# Patient Record
Sex: FEMALE | Race: WHITE | NOT HISPANIC OR LATINO
[De-identification: names, ages, dates, MRNs, and addresses within clinical notes are randomized per-mention and may not be internally consistent; named-entity substitution may affect disease eponyms.]

---

## 2019-03-20 ENCOUNTER — ASOB RESULT (OUTPATIENT)
Age: 79
End: 2019-03-20

## 2019-03-20 ENCOUNTER — APPOINTMENT (OUTPATIENT)
Age: 79
End: 2019-03-20
Payer: MEDICARE

## 2019-03-20 PROBLEM — Z00.00 ENCOUNTER FOR PREVENTIVE HEALTH EXAMINATION: Status: ACTIVE | Noted: 2019-03-20

## 2019-03-20 PROCEDURE — 76856 US EXAM PELVIC COMPLETE: CPT | Mod: 59

## 2019-03-20 PROCEDURE — 76830 TRANSVAGINAL US NON-OB: CPT

## 2020-07-02 ENCOUNTER — INPATIENT (INPATIENT)
Facility: HOSPITAL | Age: 80
LOS: 12 days | Discharge: PSYCHIATRIC OR SUBSTANCE USE FACILITY | DRG: 885 | End: 2020-07-15
Attending: PSYCHIATRY & NEUROLOGY | Admitting: PSYCHIATRY & NEUROLOGY
Payer: MEDICARE

## 2020-07-02 VITALS
OXYGEN SATURATION: 97 % | SYSTOLIC BLOOD PRESSURE: 119 MMHG | RESPIRATION RATE: 16 BRPM | WEIGHT: 128.09 LBS | TEMPERATURE: 100 F | HEART RATE: 90 BPM | DIASTOLIC BLOOD PRESSURE: 47 MMHG | HEIGHT: 64 IN

## 2020-07-02 DIAGNOSIS — F31.4 BIPOLAR DISORDER, CURRENT EPISODE DEPRESSED, SEVERE, WITHOUT PSYCHOTIC FEATURES: ICD-10-CM

## 2020-07-02 DIAGNOSIS — F43.10 POST-TRAUMATIC STRESS DISORDER, UNSPECIFIED: ICD-10-CM

## 2020-07-02 DIAGNOSIS — R45.851 SUICIDAL IDEATIONS: ICD-10-CM

## 2020-07-02 DIAGNOSIS — F41.1 GENERALIZED ANXIETY DISORDER: ICD-10-CM

## 2020-07-02 LAB
ALBUMIN SERPL ELPH-MCNC: 3.5 G/DL — SIGNIFICANT CHANGE UP (ref 3.3–5)
ALP SERPL-CCNC: 98 U/L — SIGNIFICANT CHANGE UP (ref 40–120)
ALT FLD-CCNC: 20 U/L — SIGNIFICANT CHANGE UP (ref 12–78)
ANION GAP SERPL CALC-SCNC: 7 MMOL/L — SIGNIFICANT CHANGE UP (ref 5–17)
APAP SERPL-MCNC: < 2 UG/ML (ref 10–30)
APPEARANCE UR: CLEAR — SIGNIFICANT CHANGE UP
APTT BLD: 30.6 SEC — SIGNIFICANT CHANGE UP (ref 27.5–35.5)
AST SERPL-CCNC: 10 U/L — LOW (ref 15–37)
BASOPHILS # BLD AUTO: 0.04 K/UL — SIGNIFICANT CHANGE UP (ref 0–0.2)
BASOPHILS NFR BLD AUTO: 0.7 % — SIGNIFICANT CHANGE UP (ref 0–2)
BILIRUB SERPL-MCNC: 0.2 MG/DL — SIGNIFICANT CHANGE UP (ref 0.2–1.2)
BILIRUB UR-MCNC: NEGATIVE — SIGNIFICANT CHANGE UP
BUN SERPL-MCNC: 17 MG/DL — SIGNIFICANT CHANGE UP (ref 7–23)
CALCIUM SERPL-MCNC: 9.2 MG/DL — SIGNIFICANT CHANGE UP (ref 8.5–10.1)
CHLORIDE SERPL-SCNC: 100 MMOL/L — SIGNIFICANT CHANGE UP (ref 96–108)
CO2 SERPL-SCNC: 27 MMOL/L — SIGNIFICANT CHANGE UP (ref 22–31)
COLOR SPEC: YELLOW — SIGNIFICANT CHANGE UP
CREAT SERPL-MCNC: 0.82 MG/DL — SIGNIFICANT CHANGE UP (ref 0.5–1.3)
DIFF PNL FLD: NEGATIVE — SIGNIFICANT CHANGE UP
EOSINOPHIL # BLD AUTO: 0.03 K/UL — SIGNIFICANT CHANGE UP (ref 0–0.5)
EOSINOPHIL NFR BLD AUTO: 0.5 % — SIGNIFICANT CHANGE UP (ref 0–6)
ETHANOL SERPL-MCNC: <10 MG/DL — SIGNIFICANT CHANGE UP (ref 0–10)
GLUCOSE SERPL-MCNC: 104 MG/DL — HIGH (ref 70–99)
GLUCOSE UR QL: NEGATIVE MG/DL — SIGNIFICANT CHANGE UP
HCT VFR BLD CALC: 39 % — SIGNIFICANT CHANGE UP (ref 34.5–45)
HGB BLD-MCNC: 13.4 G/DL — SIGNIFICANT CHANGE UP (ref 11.5–15.5)
IMM GRANULOCYTES NFR BLD AUTO: 0.2 % — SIGNIFICANT CHANGE UP (ref 0–1.5)
INR BLD: 1.04 RATIO — SIGNIFICANT CHANGE UP (ref 0.88–1.16)
KETONES UR-MCNC: NEGATIVE — SIGNIFICANT CHANGE UP
LEUKOCYTE ESTERASE UR-ACNC: NEGATIVE — SIGNIFICANT CHANGE UP
LYMPHOCYTES # BLD AUTO: 1.07 K/UL — SIGNIFICANT CHANGE UP (ref 1–3.3)
LYMPHOCYTES # BLD AUTO: 17.6 % — SIGNIFICANT CHANGE UP (ref 13–44)
MCHC RBC-ENTMCNC: 33.3 PG — SIGNIFICANT CHANGE UP (ref 27–34)
MCHC RBC-ENTMCNC: 34.4 GM/DL — SIGNIFICANT CHANGE UP (ref 32–36)
MCV RBC AUTO: 97 FL — SIGNIFICANT CHANGE UP (ref 80–100)
MONOCYTES # BLD AUTO: 0.62 K/UL — SIGNIFICANT CHANGE UP (ref 0–0.9)
MONOCYTES NFR BLD AUTO: 10.2 % — SIGNIFICANT CHANGE UP (ref 2–14)
NEUTROPHILS # BLD AUTO: 4.31 K/UL — SIGNIFICANT CHANGE UP (ref 1.8–7.4)
NEUTROPHILS NFR BLD AUTO: 70.8 % — SIGNIFICANT CHANGE UP (ref 43–77)
NITRITE UR-MCNC: NEGATIVE — SIGNIFICANT CHANGE UP
PCP SPEC-MCNC: SIGNIFICANT CHANGE UP
PH UR: 7 — SIGNIFICANT CHANGE UP (ref 5–8)
PLATELET # BLD AUTO: 287 K/UL — SIGNIFICANT CHANGE UP (ref 150–400)
POTASSIUM SERPL-MCNC: 3.9 MMOL/L — SIGNIFICANT CHANGE UP (ref 3.5–5.3)
POTASSIUM SERPL-SCNC: 3.9 MMOL/L — SIGNIFICANT CHANGE UP (ref 3.5–5.3)
PROT SERPL-MCNC: 7.2 GM/DL — SIGNIFICANT CHANGE UP (ref 6–8.3)
PROT UR-MCNC: NEGATIVE MG/DL — SIGNIFICANT CHANGE UP
PROTHROM AB SERPL-ACNC: 12.1 SEC — SIGNIFICANT CHANGE UP (ref 10.6–13.6)
RBC # BLD: 4.02 M/UL — SIGNIFICANT CHANGE UP (ref 3.8–5.2)
RBC # FLD: 12.8 % — SIGNIFICANT CHANGE UP (ref 10.3–14.5)
SALICYLATES SERPL-MCNC: 1.8 MG/DL — LOW (ref 2.8–20)
SARS-COV-2 RNA SPEC QL NAA+PROBE: SIGNIFICANT CHANGE UP
SODIUM SERPL-SCNC: 134 MMOL/L — LOW (ref 135–145)
SP GR SPEC: 1.01 — SIGNIFICANT CHANGE UP (ref 1.01–1.02)
UROBILINOGEN FLD QL: NEGATIVE MG/DL — SIGNIFICANT CHANGE UP
WBC # BLD: 6.08 K/UL — SIGNIFICANT CHANGE UP (ref 3.8–10.5)
WBC # FLD AUTO: 6.08 K/UL — SIGNIFICANT CHANGE UP (ref 3.8–10.5)

## 2020-07-02 PROCEDURE — 80156 ASSAY CARBAMAZEPINE TOTAL: CPT

## 2020-07-02 PROCEDURE — 83036 HEMOGLOBIN GLYCOSYLATED A1C: CPT

## 2020-07-02 PROCEDURE — 85025 COMPLETE CBC W/AUTO DIFF WBC: CPT

## 2020-07-02 PROCEDURE — 71045 X-RAY EXAM CHEST 1 VIEW: CPT

## 2020-07-02 PROCEDURE — 93010 ELECTROCARDIOGRAM REPORT: CPT

## 2020-07-02 PROCEDURE — 36415 COLL VENOUS BLD VENIPUNCTURE: CPT

## 2020-07-02 PROCEDURE — 80053 COMPREHEN METABOLIC PANEL: CPT

## 2020-07-02 PROCEDURE — 81001 URINALYSIS AUTO W/SCOPE: CPT

## 2020-07-02 PROCEDURE — 87040 BLOOD CULTURE FOR BACTERIA: CPT

## 2020-07-02 PROCEDURE — 87086 URINE CULTURE/COLONY COUNT: CPT

## 2020-07-02 PROCEDURE — 80061 LIPID PANEL: CPT

## 2020-07-02 PROCEDURE — 87635 SARS-COV-2 COVID-19 AMP PRB: CPT

## 2020-07-02 PROCEDURE — 86769 SARS-COV-2 COVID-19 ANTIBODY: CPT

## 2020-07-02 PROCEDURE — 84443 ASSAY THYROID STIM HORMONE: CPT

## 2020-07-02 PROCEDURE — 99222 1ST HOSP IP/OBS MODERATE 55: CPT

## 2020-07-02 RX ORDER — CLONAZEPAM 1 MG
0.25 TABLET ORAL AT BEDTIME
Refills: 0 | Status: DISCONTINUED | OUTPATIENT
Start: 2020-07-02 | End: 2020-07-09

## 2020-07-02 RX ORDER — CARBAMAZEPINE 200 MG
200 TABLET ORAL AT BEDTIME
Refills: 0 | Status: DISCONTINUED | OUTPATIENT
Start: 2020-07-02 | End: 2020-07-04

## 2020-07-02 RX ORDER — LOSARTAN POTASSIUM 100 MG/1
100 TABLET, FILM COATED ORAL DAILY
Refills: 0 | Status: DISCONTINUED | OUTPATIENT
Start: 2020-07-03 | End: 2020-07-15

## 2020-07-02 RX ORDER — LEVOTHYROXINE SODIUM 125 MCG
75 TABLET ORAL DAILY
Refills: 0 | Status: DISCONTINUED | OUTPATIENT
Start: 2020-07-02 | End: 2020-07-15

## 2020-07-02 RX ORDER — AMLODIPINE BESYLATE 2.5 MG/1
10 TABLET ORAL DAILY
Refills: 0 | Status: DISCONTINUED | OUTPATIENT
Start: 2020-07-03 | End: 2020-07-15

## 2020-07-02 RX ORDER — ESCITALOPRAM OXALATE 10 MG/1
20 TABLET, FILM COATED ORAL DAILY
Refills: 0 | Status: DISCONTINUED | OUTPATIENT
Start: 2020-07-03 | End: 2020-07-05

## 2020-07-02 RX ORDER — QUETIAPINE FUMARATE 200 MG/1
100 TABLET, FILM COATED ORAL AT BEDTIME
Refills: 0 | Status: DISCONTINUED | OUTPATIENT
Start: 2020-07-02 | End: 2020-07-15

## 2020-07-02 RX ORDER — CARBAMAZEPINE 200 MG
100 TABLET ORAL DAILY
Refills: 0 | Status: DISCONTINUED | OUTPATIENT
Start: 2020-07-03 | End: 2020-07-04

## 2020-07-02 RX ADMIN — Medication 1 MILLIGRAM(S): at 16:01

## 2020-07-02 RX ADMIN — Medication 0.25 MILLIGRAM(S): at 21:19

## 2020-07-02 RX ADMIN — QUETIAPINE FUMARATE 100 MILLIGRAM(S): 200 TABLET, FILM COATED ORAL at 21:19

## 2020-07-02 RX ADMIN — Medication 200 MILLIGRAM(S): at 21:19

## 2020-07-02 RX ADMIN — Medication 25 MILLIGRAM(S): at 21:19
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
## 2020-07-02 NOTE — ED BEHAVIORAL HEALTH ASSESSMENT NOTE - RISK ASSESSMENT
High Acute Suicide Risk ACUTE HIGH RISK     ACUTE RISK FACTORS: anxious depression, hopelessness, burdensomeness, anhedonia, suicidal ideation     CHRONIC RISK FACTORS: Bipolar, history of in-patient hospitalization, history of ECT, history of suicide attempts    PROTECTIVE FACTORS: no suicide plan / intent, supportive family, motivation for psychiatric treatment; medication compliance

## 2020-07-02 NOTE — ED BEHAVIORAL HEALTH ASSESSMENT NOTE - PAST PSYCHOTROPIC MEDICATION
multiple failed medications over the years Hence. required ECT multiple failed medications over the years Hence. required ECT  Lithium, Buspar, Abilify, Remeron, Rexulti, Vraylar, Gabapentin, Cogentin ...

## 2020-07-02 NOTE — ED BEHAVIORAL HEALTH ASSESSMENT NOTE - SUICIDE RISK FACTORS
Impulsivity/Anhedonia/Mood Disorder current/past/Current mood episode/Agitation/Severe Anxiety/Panic/Insomnia/Other
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
## 2020-07-02 NOTE — ED BEHAVIORAL HEALTH ASSESSMENT NOTE - DESCRIPTION
living with daughter Unknown. Anxious    ICU Vital Signs Last 24 Hrs  T(C): 37.7 (02 Jul 2020 12:05), Max: 37.7 (02 Jul 2020 12:05)  T(F): 99.9 (02 Jul 2020 12:05), Max: 99.9 (02 Jul 2020 12:05)  HR: 90 (02 Jul 2020 12:05) (90 - 90)  BP: 119/47 (02 Jul 2020 12:05) (119/47 - 119/47)  BP(mean): --  ABP: --  ABP(mean): --  RR: 16 (02 Jul 2020 12:05) (16 - 16)  SpO2: 97% (02 Jul 2020 12:05) (97% - 97%)

## 2020-07-02 NOTE — ED BEHAVIORAL HEALTH ASSESSMENT NOTE - VIOLENCE RISK FACTORS:
Violent ideation/threat/speech/Affective dysregulation/Lack of insight into violence risk/need for treatment/Impulsivity/Irritability
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
## 2020-07-02 NOTE — ED BEHAVIORAL HEALTH ASSESSMENT NOTE - SELF INJURIOUS BEHAVIOR WITHOUT SUICIDAL INTENT:
[facility]  [address] [address]  [phone]    5/2/2018      Karen Ríos  [address] [place] [address]      To Whom It May Concern:    This is to certify that Karen Ríos has been under our care since April 27, 2018.  Please excuse her from all work-related activities since her admission through May 9th to allow for proper outpatient recovery treatment, or sooner if she is capable or returning.  If you have any questions, please feel free to contact us at the listed number.      Sincerely,      Raad Weiner MD  [facility]    None known

## 2020-07-02 NOTE — H&P ADULT - NSHPPHYSICALEXAM_GEN_ALL_CORE
Vital Signs  T(F): 98.7 (02 Jul 2020 15:14), Max: 99.9 (02 Jul 2020 12:05)  HR: 65 (02 Jul 2020 15:14) (65 - 90)  BP: 141/67 (02 Jul 2020 15:14) (119/47 - 141/67)  BP(mean): 87 (02 Jul 2020 15:14) (87 - 87)  RR: 17 (02 Jul 2020 15:14) (16 - 17)  SpO2: 98% (02 Jul 2020 15:14) (97% - 98%)

## 2020-07-02 NOTE — H&P ADULT - ATTENDING COMMENTS
78 y/o F PMHx as noted above admitted to inpatient Psychiatry for evaluation and management of suicidal ideation and Bipolar disorder.    #Bipolar disorder  ~cont. management per Psychiatry    #Hypertension  ~cont. Losartan and Amlodipine     # Hypothyroidism  ~f/u TFTs  ~cont. Levothyroxine     #Vte ppx  ~encourage ambulation

## 2020-07-02 NOTE — H&P ADULT - ASSESSMENT
80 y/o female with PMHx of HTN, Hypothyroidism and depression presented to ED with suicidal and homicidal ideation.     # Bipolar disorder  -Management per primary psych team  -Fall precaution     # HTN  -Continue with Losartan and Norvasc with holding parameters     # Hypothyroidism  -Check TSH  -Continue with Synthroid    DVT ppx   Encourage ambulation
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
## 2020-07-02 NOTE — ED ADULT NURSE NOTE - OBJECTIVE STATEMENT
pt is 78 yo female presents to ED for SI, pt states she has thoughts about hurting herself, no plans, occasional HI, she sometimes want to hurt her daughter.  She feels she is a burden to her family, +anxiety.  hx of cutting her wrist back in Feb 2020.

## 2020-07-03 LAB
A1C WITH ESTIMATED AVERAGE GLUCOSE RESULT: 5 % — SIGNIFICANT CHANGE UP (ref 4–5.6)
CHOLEST SERPL-MCNC: 232 MG/DL — HIGH (ref 10–199)
CULTURE RESULTS: SIGNIFICANT CHANGE UP
ESTIMATED AVERAGE GLUCOSE: 97 MG/DL — SIGNIFICANT CHANGE UP (ref 68–114)
HDLC SERPL-MCNC: 65 MG/DL — SIGNIFICANT CHANGE UP
LIPID PNL WITH DIRECT LDL SERPL: 154 MG/DL — HIGH
SARS-COV-2 IGG SERPL QL IA: POSITIVE
SARS-COV-2 IGM SERPL IA-ACNC: 81.2 INDEX — HIGH
SPECIMEN SOURCE: SIGNIFICANT CHANGE UP
TOTAL CHOLESTEROL/HDL RATIO MEASUREMENT: 3.6 RATIO — SIGNIFICANT CHANGE UP (ref 3.3–7.1)
TRIGL SERPL-MCNC: 68 MG/DL — SIGNIFICANT CHANGE UP (ref 10–149)
TSH SERPL-MCNC: 2.28 UU/ML — SIGNIFICANT CHANGE UP (ref 0.34–4.82)

## 2020-07-03 PROCEDURE — 99223 1ST HOSP IP/OBS HIGH 75: CPT

## 2020-07-03 RX ORDER — ACETAMINOPHEN 500 MG
325 TABLET ORAL EVERY 6 HOURS
Refills: 0 | Status: DISCONTINUED | OUTPATIENT
Start: 2020-07-03 | End: 2020-07-15

## 2020-07-03 RX ADMIN — Medication 325 MILLIGRAM(S): at 22:01

## 2020-07-03 RX ADMIN — LOSARTAN POTASSIUM 100 MILLIGRAM(S): 100 TABLET, FILM COATED ORAL at 09:50

## 2020-07-03 RX ADMIN — Medication 100 MILLIGRAM(S): at 09:50

## 2020-07-03 RX ADMIN — AMLODIPINE BESYLATE 10 MILLIGRAM(S): 2.5 TABLET ORAL at 09:47

## 2020-07-03 RX ADMIN — Medication 0.25 MILLIGRAM(S): at 21:43

## 2020-07-03 RX ADMIN — Medication 75 MICROGRAM(S): at 05:18

## 2020-07-03 RX ADMIN — Medication 25 MILLIGRAM(S): at 21:43

## 2020-07-03 RX ADMIN — Medication 200 MILLIGRAM(S): at 21:43

## 2020-07-03 RX ADMIN — Medication 25 MILLIGRAM(S): at 09:47

## 2020-07-03 RX ADMIN — ESCITALOPRAM OXALATE 20 MILLIGRAM(S): 10 TABLET, FILM COATED ORAL at 09:47

## 2020-07-03 RX ADMIN — QUETIAPINE FUMARATE 100 MILLIGRAM(S): 200 TABLET, FILM COATED ORAL at 21:43

## 2020-07-03 NOTE — PROGRESS NOTE BEHAVIORAL HEALTH - NSBHADDHXPSYCHFT_PSY_A_CORE
Dr. Rucker [phone] out-patient psychiatrist  prior multiple in-patient hospitalizations (~7 around age 30's) with positive responses to ECT and stable for 40 years;  2 recent admissions this year (2020) between 2/2020 - 5/1/2020 with precipitant being a fall 3/2020 leading to being on medication and triggered psychiatric decompensation,  prior suicidal ideation and suicide attempt via cutting wrists (needing sutures)
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
## 2020-07-03 NOTE — PROGRESS NOTE BEHAVIORAL HEALTH - NSBHCHARTREVIEWVS_PSY_A_CORE FT
Vital Signs Last 24 Hrs  T(C): 36.7 (03 Jul 2020 08:11), Max: 36.9 (03 Jul 2020 03:35)  T(F): 98.1 (03 Jul 2020 08:11), Max: 98.4 (03 Jul 2020 03:35)  HR: --  BP: --  BP(mean): --  RR: 14 (03 Jul 2020 08:11) (14 - 16)  SpO2: 100% (03 Jul 2020 08:11) (98% - 100%)

## 2020-07-03 NOTE — PROGRESS NOTE BEHAVIORAL HEALTH - VIOLENCE RISK FACTORS:
Violent ideation/threat/speech/Affective dysregulation/Irritability/Impulsivity/Lack of insight into violence risk/need for treatment
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
## 2020-07-04 PROCEDURE — 99231 SBSQ HOSP IP/OBS SF/LOW 25: CPT

## 2020-07-04 RX ORDER — CARBAMAZEPINE 200 MG
200 TABLET ORAL
Refills: 0 | Status: DISCONTINUED | OUTPATIENT
Start: 2020-07-04 | End: 2020-07-10

## 2020-07-04 RX ADMIN — Medication 25 MILLIGRAM(S): at 09:12

## 2020-07-04 RX ADMIN — Medication 75 MICROGRAM(S): at 05:46

## 2020-07-04 RX ADMIN — Medication 0.25 MILLIGRAM(S): at 20:48

## 2020-07-04 RX ADMIN — Medication 200 MILLIGRAM(S): at 20:48

## 2020-07-04 RX ADMIN — ESCITALOPRAM OXALATE 20 MILLIGRAM(S): 10 TABLET, FILM COATED ORAL at 09:11

## 2020-07-04 RX ADMIN — Medication 25 MILLIGRAM(S): at 20:48

## 2020-07-04 RX ADMIN — LOSARTAN POTASSIUM 100 MILLIGRAM(S): 100 TABLET, FILM COATED ORAL at 09:11

## 2020-07-04 RX ADMIN — Medication 100 MILLIGRAM(S): at 09:28

## 2020-07-04 RX ADMIN — QUETIAPINE FUMARATE 100 MILLIGRAM(S): 200 TABLET, FILM COATED ORAL at 20:48

## 2020-07-04 RX ADMIN — AMLODIPINE BESYLATE 10 MILLIGRAM(S): 2.5 TABLET ORAL at 09:11
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
## 2020-07-05 DIAGNOSIS — R45.851 SUICIDAL IDEATIONS: ICD-10-CM

## 2020-07-05 PROCEDURE — 99232 SBSQ HOSP IP/OBS MODERATE 35: CPT

## 2020-07-05 RX ORDER — SENNA PLUS 8.6 MG/1
2 TABLET ORAL AT BEDTIME
Refills: 0 | Status: DISCONTINUED | OUTPATIENT
Start: 2020-07-05 | End: 2020-07-15

## 2020-07-05 RX ORDER — ESCITALOPRAM OXALATE 10 MG/1
10 TABLET, FILM COATED ORAL DAILY
Refills: 0 | Status: DISCONTINUED | OUTPATIENT
Start: 2020-07-05 | End: 2020-07-15

## 2020-07-05 RX ADMIN — ESCITALOPRAM OXALATE 20 MILLIGRAM(S): 10 TABLET, FILM COATED ORAL at 09:01

## 2020-07-05 RX ADMIN — QUETIAPINE FUMARATE 100 MILLIGRAM(S): 200 TABLET, FILM COATED ORAL at 20:59

## 2020-07-05 RX ADMIN — Medication 75 MICROGRAM(S): at 06:14

## 2020-07-05 RX ADMIN — Medication 25 MILLIGRAM(S): at 20:59

## 2020-07-05 RX ADMIN — Medication 0.25 MILLIGRAM(S): at 21:00

## 2020-07-05 RX ADMIN — Medication 200 MILLIGRAM(S): at 21:00

## 2020-07-05 RX ADMIN — SENNA PLUS 2 TABLET(S): 8.6 TABLET ORAL at 20:59

## 2020-07-05 RX ADMIN — LOSARTAN POTASSIUM 100 MILLIGRAM(S): 100 TABLET, FILM COATED ORAL at 09:01

## 2020-07-05 RX ADMIN — Medication 25 MILLIGRAM(S): at 09:02

## 2020-07-05 RX ADMIN — Medication 200 MILLIGRAM(S): at 09:01

## 2020-07-05 RX ADMIN — AMLODIPINE BESYLATE 10 MILLIGRAM(S): 2.5 TABLET ORAL at 09:01

## 2020-07-05 NOTE — PROGRESS NOTE BEHAVIORAL HEALTH - NSBHCHARTREVIEWVS_PSY_A_CORE FT
Vital Signs Last 24 Hrs  T(C): 36.5 (05 Jul 2020 08:33), Max: 36.5 (05 Jul 2020 08:33)  T(F): 97.7 (05 Jul 2020 08:33), Max: 97.7 (05 Jul 2020 08:33)  HR: --  BP: --  BP(mean): --  RR: 14 (05 Jul 2020 08:33) (14 - 14)  SpO2: 100% (05 Jul 2020 08:33) (100% - 100%)
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
## 2020-07-06 LAB
APPEARANCE UR: CLEAR — SIGNIFICANT CHANGE UP
BILIRUB UR-MCNC: NEGATIVE — SIGNIFICANT CHANGE UP
COLOR SPEC: YELLOW — SIGNIFICANT CHANGE UP
DIFF PNL FLD: NEGATIVE — SIGNIFICANT CHANGE UP
GLUCOSE UR QL: NEGATIVE MG/DL — SIGNIFICANT CHANGE UP
KETONES UR-MCNC: NEGATIVE — SIGNIFICANT CHANGE UP
LEUKOCYTE ESTERASE UR-ACNC: ABNORMAL
NITRITE UR-MCNC: NEGATIVE — SIGNIFICANT CHANGE UP
PH UR: 6 — SIGNIFICANT CHANGE UP (ref 5–8)
PROT UR-MCNC: NEGATIVE MG/DL — SIGNIFICANT CHANGE UP
SP GR SPEC: 1.02 — SIGNIFICANT CHANGE UP (ref 1.01–1.02)
UROBILINOGEN FLD QL: NEGATIVE MG/DL — SIGNIFICANT CHANGE UP

## 2020-07-06 PROCEDURE — 99231 SBSQ HOSP IP/OBS SF/LOW 25: CPT

## 2020-07-06 PROCEDURE — 99232 SBSQ HOSP IP/OBS MODERATE 35: CPT

## 2020-07-06 RX ORDER — PSYLLIUM SEED (WITH DEXTROSE)
1 POWDER (GRAM) ORAL
Refills: 0 | Status: DISCONTINUED | OUTPATIENT
Start: 2020-07-06 | End: 2020-07-15

## 2020-07-06 RX ADMIN — LOSARTAN POTASSIUM 100 MILLIGRAM(S): 100 TABLET, FILM COATED ORAL at 10:38

## 2020-07-06 RX ADMIN — Medication 200 MILLIGRAM(S): at 21:42

## 2020-07-06 RX ADMIN — Medication 75 MICROGRAM(S): at 05:36

## 2020-07-06 RX ADMIN — Medication 200 MILLIGRAM(S): at 11:02

## 2020-07-06 RX ADMIN — Medication 25 MILLIGRAM(S): at 10:38

## 2020-07-06 RX ADMIN — AMLODIPINE BESYLATE 10 MILLIGRAM(S): 2.5 TABLET ORAL at 10:48

## 2020-07-06 RX ADMIN — QUETIAPINE FUMARATE 100 MILLIGRAM(S): 200 TABLET, FILM COATED ORAL at 21:40

## 2020-07-06 RX ADMIN — ESCITALOPRAM OXALATE 10 MILLIGRAM(S): 10 TABLET, FILM COATED ORAL at 10:37

## 2020-07-06 RX ADMIN — Medication 25 MILLIGRAM(S): at 21:40

## 2020-07-06 RX ADMIN — Medication 0.25 MILLIGRAM(S): at 21:40

## 2020-07-06 RX ADMIN — Medication 1 MILLIGRAM(S): at 10:44

## 2020-07-06 NOTE — PROGRESS NOTE BEHAVIORAL HEALTH - NSBHCHARTREVIEWVS_PSY_A_CORE FT
Vital Signs Last 24 Hrs  T(C): 36.6 (06 Jul 2020 08:27), Max: 36.6 (06 Jul 2020 08:27)  T(F): 97.8 (06 Jul 2020 08:27), Max: 97.8 (06 Jul 2020 08:27)  HR: --  BP: --  BP(mean): --  RR: 14 (06 Jul 2020 08:27) (14 - 14)  SpO2: 100% (06 Jul 2020 08:27) (100% - 100%)

## 2020-07-06 NOTE — PROGRESS NOTE BEHAVIORAL HEALTH - NSBHFUPINTERVALHXFT_PSY_A_CORE
Pt still with anxiety and labile mood. She is not sure about ect yet she apparently did well with the treatment  Currently pt  isolating  in her room.  Pt still claiming "I think I'm going to die here ... right?"

## 2020-07-07 PROCEDURE — 99231 SBSQ HOSP IP/OBS SF/LOW 25: CPT

## 2020-07-07 RX ADMIN — Medication 200 MILLIGRAM(S): at 21:12

## 2020-07-07 RX ADMIN — QUETIAPINE FUMARATE 100 MILLIGRAM(S): 200 TABLET, FILM COATED ORAL at 21:12

## 2020-07-07 RX ADMIN — Medication 200 MILLIGRAM(S): at 12:50

## 2020-07-07 RX ADMIN — Medication 25 MILLIGRAM(S): at 21:12

## 2020-07-07 RX ADMIN — ESCITALOPRAM OXALATE 10 MILLIGRAM(S): 10 TABLET, FILM COATED ORAL at 12:47

## 2020-07-07 RX ADMIN — Medication 1 MILLIGRAM(S): at 12:54

## 2020-07-07 RX ADMIN — Medication 0.25 MILLIGRAM(S): at 21:14

## 2020-07-07 RX ADMIN — Medication 75 MICROGRAM(S): at 06:28

## 2020-07-07 RX ADMIN — AMLODIPINE BESYLATE 10 MILLIGRAM(S): 2.5 TABLET ORAL at 12:47

## 2020-07-07 RX ADMIN — Medication 325 MILLIGRAM(S): at 14:46

## 2020-07-07 RX ADMIN — Medication 1 PACKET(S): at 14:46

## 2020-07-07 RX ADMIN — Medication 25 MILLIGRAM(S): at 12:48

## 2020-07-07 RX ADMIN — LOSARTAN POTASSIUM 100 MILLIGRAM(S): 100 TABLET, FILM COATED ORAL at 12:48
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
## 2020-07-07 NOTE — PROGRESS NOTE BEHAVIORAL HEALTH - NSBHCHARTREVIEWVS_PSY_A_CORE FT
Vital Signs Last 24 Hrs  T(C): 36.8 (07 Jul 2020 12:39), Max: 36.8 (07 Jul 2020 12:39)  T(F): 98.2 (07 Jul 2020 12:39), Max: 98.2 (07 Jul 2020 12:39)  HR: 72 (06 Jul 2020 21:40) (72 - 72)  BP: 100/64 (06 Jul 2020 21:40) (100/64 - 100/64)  BP(mean): --  RR: 15 (07 Jul 2020 12:39) (14 - 15)  SpO2: 98% (07 Jul 2020 12:39) (98% - 98%)

## 2020-07-07 NOTE — PROGRESS NOTE BEHAVIORAL HEALTH - NSBHFUPINTERVALHXFT_PSY_A_CORE
Pt continue with anxiety ,and is isolative.  Discussed with pt about her hx of good results with ECT.  Pt was willing to consider this treatment and paperwork filled out for [facility] for E[facility]

## 2020-07-08 PROCEDURE — 99231 SBSQ HOSP IP/OBS SF/LOW 25: CPT

## 2020-07-08 RX ADMIN — AMLODIPINE BESYLATE 10 MILLIGRAM(S): 2.5 TABLET ORAL at 11:18

## 2020-07-08 RX ADMIN — Medication 25 MILLIGRAM(S): at 11:18

## 2020-07-08 RX ADMIN — LOSARTAN POTASSIUM 100 MILLIGRAM(S): 100 TABLET, FILM COATED ORAL at 11:18

## 2020-07-08 RX ADMIN — QUETIAPINE FUMARATE 100 MILLIGRAM(S): 200 TABLET, FILM COATED ORAL at 21:06

## 2020-07-08 RX ADMIN — ESCITALOPRAM OXALATE 10 MILLIGRAM(S): 10 TABLET, FILM COATED ORAL at 11:18

## 2020-07-08 RX ADMIN — Medication 75 MICROGRAM(S): at 05:52

## 2020-07-08 RX ADMIN — Medication 25 MILLIGRAM(S): at 21:06

## 2020-07-08 RX ADMIN — Medication 200 MILLIGRAM(S): at 21:05

## 2020-07-08 RX ADMIN — Medication 0.25 MILLIGRAM(S): at 21:05

## 2020-07-08 RX ADMIN — Medication 1 MILLIGRAM(S): at 11:17

## 2020-07-08 RX ADMIN — Medication 200 MILLIGRAM(S): at 11:18
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
## 2020-07-08 NOTE — PROGRESS NOTE BEHAVIORAL HEALTH - NSBHCHARTREVIEWVS_PSY_A_CORE FT
Vital Signs Last 24 Hrs  T(C): 36.3 (08 Jul 2020 08:26), Max: 36.3 (08 Jul 2020 08:26)  T(F): 97.4 (08 Jul 2020 08:26), Max: 97.4 (08 Jul 2020 08:26)  HR: --  BP: --  BP(mean): --  RR: 16 (08 Jul 2020 08:26) (16 - 16)  SpO2: 98% (08 Jul 2020 08:26) (98% - 98%)

## 2020-07-09 PROCEDURE — 99231 SBSQ HOSP IP/OBS SF/LOW 25: CPT

## 2020-07-09 RX ADMIN — SENNA PLUS 2 TABLET(S): 8.6 TABLET ORAL at 20:11

## 2020-07-09 RX ADMIN — QUETIAPINE FUMARATE 100 MILLIGRAM(S): 200 TABLET, FILM COATED ORAL at 20:11

## 2020-07-09 RX ADMIN — Medication 75 MICROGRAM(S): at 05:26

## 2020-07-09 RX ADMIN — ESCITALOPRAM OXALATE 10 MILLIGRAM(S): 10 TABLET, FILM COATED ORAL at 10:20

## 2020-07-09 RX ADMIN — Medication 25 MILLIGRAM(S): at 10:20

## 2020-07-09 RX ADMIN — Medication 200 MILLIGRAM(S): at 10:20

## 2020-07-09 RX ADMIN — Medication 25 MILLIGRAM(S): at 21:01

## 2020-07-09 RX ADMIN — Medication 200 MILLIGRAM(S): at 21:01

## 2020-07-09 RX ADMIN — Medication 0.25 MILLIGRAM(S): at 20:11
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
## 2020-07-09 NOTE — PROGRESS NOTE BEHAVIORAL HEALTH - NSBHFUPINTERVALHXFT_PSY_A_CORE
Pt still with isolation and depression and is still wanting to go for ect . Pt with depressed mood and still with anxiety about her living situation.  Pt remaining on medication and is compliant.  Pt with a little less angst in general with the adjustment of the tegretal Pt still wanting ECT as she had reportedly good results with the treatment with no side effect .  Pt is denying any suicidal or homicidal intent or plan Pt remaining preoccupied

## 2020-07-09 NOTE — PROGRESS NOTE BEHAVIORAL HEALTH - NSBHCHARTREVIEWVS_PSY_A_CORE FT
Vital Signs Last 24 Hrs  T(C): --  T(F): --  HR: --  BP: --  BP(mean): --  RR: --  SpO2: -- Vital Signs Last 24 Hrs  T(C): 36.8 (09 Jul 2020 10:58), Max: 36.8 (09 Jul 2020 10:58)  T(F): 98.2 (09 Jul 2020 10:58), Max: 98.2 (09 Jul 2020 10:58)  HR: --  BP: --  BP(mean): --  RR: 15 (09 Jul 2020 10:58) (15 - 15)  SpO2: 98% (09 Jul 2020 10:58) (98% - 98%)

## 2020-07-10 PROCEDURE — 99232 SBSQ HOSP IP/OBS MODERATE 35: CPT

## 2020-07-10 RX ORDER — CLONAZEPAM 1 MG
0.25 TABLET ORAL AT BEDTIME
Refills: 0 | Status: DISCONTINUED | OUTPATIENT
Start: 2020-07-10 | End: 2020-07-15

## 2020-07-10 RX ADMIN — ESCITALOPRAM OXALATE 10 MILLIGRAM(S): 10 TABLET, FILM COATED ORAL at 09:21

## 2020-07-10 RX ADMIN — LOSARTAN POTASSIUM 100 MILLIGRAM(S): 100 TABLET, FILM COATED ORAL at 09:21

## 2020-07-10 RX ADMIN — Medication 0.25 MILLIGRAM(S): at 21:39

## 2020-07-10 RX ADMIN — QUETIAPINE FUMARATE 100 MILLIGRAM(S): 200 TABLET, FILM COATED ORAL at 21:38

## 2020-07-10 RX ADMIN — AMLODIPINE BESYLATE 10 MILLIGRAM(S): 2.5 TABLET ORAL at 09:21

## 2020-07-10 RX ADMIN — Medication 200 MILLIGRAM(S): at 09:21

## 2020-07-10 RX ADMIN — Medication 25 MILLIGRAM(S): at 21:38

## 2020-07-10 RX ADMIN — Medication 75 MICROGRAM(S): at 05:46

## 2020-07-10 NOTE — PROGRESS NOTE BEHAVIORAL HEALTH - NSBHFUPINTERVALHXFT_PSY_A_CORE
Pt daughter was saying that the pt has been only here for the last two to three days but pt has been here since July 2.   Had placed a call to Dr Rucker but had yet to get a call back but the pt now indicated that "best to text him. " . Either way the pt with initial added anxiety and periods of mood lability . and still not call back with Dr Rucker so needed to decrease the lexapro to 10mg to decrease the mood lability and the pt is less angst . Still awaiting a bed at [facility] for ECT. Pt's daughter was upset that the dose of the lexapro was decreased to 10mg which is the dose that the pt indicated was the dose that Dr Rajput prescribed for treating the anxiety .   Daughter would like Dr Choudhary to consider taking over the care of the pt . Pt herself was very gracious and apologetic for daughter's outburst, but will still attempt to switch the management of the patients care so that there would be less extraneous issues.

## 2020-07-10 NOTE — PROGRESS NOTE BEHAVIORAL HEALTH - NSBHCHARTREVIEWVS_PSY_A_CORE FT
Vital Signs Last 24 Hrs  T(C): 36.6 (10 Jul 2020 07:45), Max: 36.6 (10 Jul 2020 07:45)  T(F): 97.8 (10 Jul 2020 07:45), Max: 97.8 (10 Jul 2020 07:45)  HR: --  BP: --  BP(mean): --  RR: 16 (10 Jul 2020 07:45) (16 - 16)  SpO2: 98% (10 Jul 2020 07:45) (98% - 98%)
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
## 2020-07-11 PROCEDURE — 99231 SBSQ HOSP IP/OBS SF/LOW 25: CPT

## 2020-07-11 RX ORDER — POLYETHYLENE GLYCOL 3350 17 G/17G
17 POWDER, FOR SOLUTION ORAL ONCE
Refills: 0 | Status: COMPLETED | OUTPATIENT
Start: 2020-07-11 | End: 2020-07-11

## 2020-07-11 RX ORDER — POLYETHYLENE GLYCOL 3350 17 G/17G
17 POWDER, FOR SOLUTION ORAL DAILY
Refills: 0 | Status: DISCONTINUED | OUTPATIENT
Start: 2020-07-12 | End: 2020-07-13

## 2020-07-11 RX ADMIN — Medication 75 MICROGRAM(S): at 06:46

## 2020-07-11 RX ADMIN — Medication 0.25 MILLIGRAM(S): at 20:40

## 2020-07-11 RX ADMIN — ESCITALOPRAM OXALATE 10 MILLIGRAM(S): 10 TABLET, FILM COATED ORAL at 09:48

## 2020-07-11 RX ADMIN — POLYETHYLENE GLYCOL 3350 17 GRAM(S): 17 POWDER, FOR SOLUTION ORAL at 12:46

## 2020-07-11 RX ADMIN — Medication 25 MILLIGRAM(S): at 20:41

## 2020-07-11 RX ADMIN — QUETIAPINE FUMARATE 100 MILLIGRAM(S): 200 TABLET, FILM COATED ORAL at 20:40

## 2020-07-11 RX ADMIN — SENNA PLUS 2 TABLET(S): 8.6 TABLET ORAL at 20:41

## 2020-07-11 RX ADMIN — Medication 25 MILLIGRAM(S): at 09:48

## 2020-07-11 RX ADMIN — Medication 1 MILLIGRAM(S): at 12:46

## 2020-07-11 NOTE — PROGRESS NOTE BEHAVIORAL HEALTH - NSBHCHARTREVIEWVS_PSY_A_CORE FT
Vital Signs Last 24 Hrs  T(C): 36.8 (11 Jul 2020 07:36), Max: 36.8 (11 Jul 2020 07:36)  T(F): 98.3 (11 Jul 2020 07:36), Max: 98.3 (11 Jul 2020 07:36)  HR: --  BP: --  BP(mean): --  RR: 16 (11 Jul 2020 07:36) (16 - 16)  SpO2: 98% (11 Jul 2020 07:36) (98% - 98%)

## 2020-07-11 NOTE — PROGRESS NOTE BEHAVIORAL HEALTH - NSBHFUPINTERVALHXFT_PSY_A_CORE
Pt daughter was saying that the pt has been only here for the last two to three days but pt has been here since July 2.   Had placed a call to Dr Rucker but had yet to get a call back but the pt now indicated that "best to text him. " . Either way the pt with initial added anxiety and periods of mood lability . and still not call back with Dr Rucker so needed to decrease the Lexapro to 10 mg to decrease the mood lability and the pt is less angst . Still awaiting a bed at [facility] for ECT. Pt's daughter was upset that the dose of the Lexapro was decreased to 10 mg which is the dose that the pt indicated was the dose that Dr Rajput prescribed for treating the anxiety.  Daughter would like Dr Choudhary to consider taking over the care of the pt . Pt herself was very gracious and apologetic for daughter's outburst, but will still attempt to switch the management of the patients care so that there would be less extraneous issues.    07/11/20: Patient was seen today AM. Modo in control endorsed that she has b/l lower abdominal cramps, so she was given a dose of Miralax. She was also anxious so was given a dose of Ativan 1 mg stat was given. No further issues noted.  She is compliant with meds with no meds induced s/e till now. She seems concerned of her bowel action more than anything else today.

## 2020-07-12 PROCEDURE — 99231 SBSQ HOSP IP/OBS SF/LOW 25: CPT

## 2020-07-12 RX ADMIN — QUETIAPINE FUMARATE 100 MILLIGRAM(S): 200 TABLET, FILM COATED ORAL at 21:01

## 2020-07-12 RX ADMIN — ESCITALOPRAM OXALATE 10 MILLIGRAM(S): 10 TABLET, FILM COATED ORAL at 09:18

## 2020-07-12 RX ADMIN — Medication 75 MICROGRAM(S): at 06:52

## 2020-07-13 PROCEDURE — 99231 SBSQ HOSP IP/OBS SF/LOW 25: CPT

## 2020-07-13 RX ORDER — ONDANSETRON 8 MG/1
4 TABLET, FILM COATED ORAL EVERY 8 HOURS
Refills: 0 | Status: DISCONTINUED | OUTPATIENT
Start: 2020-07-13 | End: 2020-07-13

## 2020-07-13 RX ORDER — LOPERAMIDE HCL 2 MG
2 TABLET ORAL ONCE
Refills: 0 | Status: COMPLETED | OUTPATIENT
Start: 2020-07-13 | End: 2020-07-14

## 2020-07-13 RX ADMIN — QUETIAPINE FUMARATE 100 MILLIGRAM(S): 200 TABLET, FILM COATED ORAL at 21:08

## 2020-07-13 RX ADMIN — Medication 75 MICROGRAM(S): at 06:39

## 2020-07-13 RX ADMIN — LOSARTAN POTASSIUM 100 MILLIGRAM(S): 100 TABLET, FILM COATED ORAL at 10:05

## 2020-07-13 RX ADMIN — ESCITALOPRAM OXALATE 10 MILLIGRAM(S): 10 TABLET, FILM COATED ORAL at 10:05

## 2020-07-13 NOTE — PROGRESS NOTE BEHAVIORAL HEALTH - NSBHFUPINTERVALHXFT_PSY_A_CORE
Pt daughter was saying that the pt has been only here for the last two to three days but pt has been here since July 2.   Had placed a call to Dr Rucker but had yet to get a call back but the pt now indicated that "best to text him. " . Either way the pt with initial added anxiety and periods of mood lability . and still not call back with Dr Rucker so needed to decrease the Lexapro to 10 mg to decrease the mood lability and the pt is less angst . Still awaiting a bed at [facility] for ECT. Pt's daughter was upset that the dose of the Lexapro was decreased to 10 mg which is the dose that the pt indicated was the dose that Dr Rajput prescribed for treating the anxiety.  Daughter would like Dr Choudhary to consider taking over the care of the pt . Pt herself was very gracious and apologetic for daughter's outburst, but will still attempt to switch the management of the patients care so that there would be less extraneous issues.    07/12/20: Patient was seen today AM. She was tearful, anxious and endorsed that She may die, was advise that he is thinking too much, so she was given a dose of Ativan 1 mg to help decrease her anxiety/mood. She looked better after the Ativan 1 mg PRN dosages. We opted to have her on 1:1 due to being unstable, later she endorsed that she is not suicidal so was on Q-15 minutes watch for stability.

## 2020-07-13 NOTE — PROGRESS NOTE BEHAVIORAL HEALTH - NSBHFUPINTERVALHXFT_PSY_A_CORE
Pt daughter was saying that the pt has been only here for the last two to three days but pt has been here since July 2.   Had placed a call to Dr Rucker but had yet to get a call back but the pt now indicated that "best to text him. " . Either way the pt with initial added anxiety and periods of mood lability . and still not call back with Dr Rucker so needed to decrease the Lexapro to 10 mg to decrease the mood lability and the pt is less angst . Still awaiting a bed at [facility] for ECT. Pt's daughter was upset that the dose of the Lexapro was decreased to 10 mg which is the dose that the pt indicated was the dose that Dr Rajput prescribed for treating the anxiety.  Daughter would like Dr Choudhary to consider taking over the care of the pt . Pt herself was very gracious and apologetic for daughter's outburst, but will still attempt to switch the management of the patients care so that there would be less extraneous issues.    07/13/20: Patient was seen today AM. Mood is anxious, at times to the pont of being tearful, and endorsing that " She may die ", writer tried to reassure her and she endorsed to have a medication for anxiety. She was given a dose of Ativan 1 mg. She also enquired that she was not given her Tegretol. As per her daughter she is on Tegretol for 40 years. Tegretol 100 mg AM and Tegretol 200 mg HS. Will not be able to start Tegretol due to transfer to [facility] tomorrow for ECT. Daughter is aware. She  also c/o increased bowel movements, so all agents are now ordered as PRN when needed.

## 2020-07-13 NOTE — PROGRESS NOTE BEHAVIORAL HEALTH - NSBHCHARTREVIEWVS_PSY_A_CORE FT
Vital Signs Last 24 Hrs  T(C): 37.2 (13 Jul 2020 07:26), Max: 37.2 (13 Jul 2020 07:26)  T(F): 98.9 (13 Jul 2020 07:26), Max: 98.9 (13 Jul 2020 07:26)  HR: --  BP: --  BP(mean): --  RR: --  SpO2: --

## 2020-07-14 LAB
ALBUMIN SERPL ELPH-MCNC: 2.9 G/DL — LOW (ref 3.3–5)
ALP SERPL-CCNC: 78 U/L — SIGNIFICANT CHANGE UP (ref 40–120)
ALT FLD-CCNC: 15 U/L — SIGNIFICANT CHANGE UP (ref 12–78)
ANION GAP SERPL CALC-SCNC: 6 MMOL/L — SIGNIFICANT CHANGE UP (ref 5–17)
APPEARANCE UR: ABNORMAL
AST SERPL-CCNC: 9 U/L — LOW (ref 15–37)
BASOPHILS # BLD AUTO: 0.05 K/UL — SIGNIFICANT CHANGE UP (ref 0–0.2)
BASOPHILS NFR BLD AUTO: 0.4 % — SIGNIFICANT CHANGE UP (ref 0–2)
BILIRUB SERPL-MCNC: 0.4 MG/DL — SIGNIFICANT CHANGE UP (ref 0.2–1.2)
BILIRUB UR-MCNC: NEGATIVE — SIGNIFICANT CHANGE UP
BUN SERPL-MCNC: 12 MG/DL — SIGNIFICANT CHANGE UP (ref 7–23)
CALCIUM SERPL-MCNC: 8.8 MG/DL — SIGNIFICANT CHANGE UP (ref 8.5–10.1)
CARBAMAZEPINE SERPL-MCNC: 2.4 UG/ML — LOW (ref 4–12)
CHLORIDE SERPL-SCNC: 99 MMOL/L — SIGNIFICANT CHANGE UP (ref 96–108)
CO2 SERPL-SCNC: 30 MMOL/L — SIGNIFICANT CHANGE UP (ref 22–31)
COLOR SPEC: YELLOW — SIGNIFICANT CHANGE UP
CREAT SERPL-MCNC: 0.68 MG/DL — SIGNIFICANT CHANGE UP (ref 0.5–1.3)
DIFF PNL FLD: NEGATIVE — SIGNIFICANT CHANGE UP
EOSINOPHIL # BLD AUTO: 0.05 K/UL — SIGNIFICANT CHANGE UP (ref 0–0.5)
EOSINOPHIL NFR BLD AUTO: 0.4 % — SIGNIFICANT CHANGE UP (ref 0–6)
GLUCOSE SERPL-MCNC: 89 MG/DL — SIGNIFICANT CHANGE UP (ref 70–99)
GLUCOSE UR QL: NEGATIVE MG/DL — SIGNIFICANT CHANGE UP
HCT VFR BLD CALC: 36.5 % — SIGNIFICANT CHANGE UP (ref 34.5–45)
HGB BLD-MCNC: 11.8 G/DL — SIGNIFICANT CHANGE UP (ref 11.5–15.5)
IMM GRANULOCYTES NFR BLD AUTO: 0.3 % — SIGNIFICANT CHANGE UP (ref 0–1.5)
KETONES UR-MCNC: NEGATIVE — SIGNIFICANT CHANGE UP
LEUKOCYTE ESTERASE UR-ACNC: ABNORMAL
LYMPHOCYTES # BLD AUTO: 1.36 K/UL — SIGNIFICANT CHANGE UP (ref 1–3.3)
LYMPHOCYTES # BLD AUTO: 11.4 % — LOW (ref 13–44)
MCHC RBC-ENTMCNC: 32.2 PG — SIGNIFICANT CHANGE UP (ref 27–34)
MCHC RBC-ENTMCNC: 32.3 GM/DL — SIGNIFICANT CHANGE UP (ref 32–36)
MCV RBC AUTO: 99.7 FL — SIGNIFICANT CHANGE UP (ref 80–100)
MONOCYTES # BLD AUTO: 1.29 K/UL — HIGH (ref 0–0.9)
MONOCYTES NFR BLD AUTO: 10.8 % — SIGNIFICANT CHANGE UP (ref 2–14)
NEUTROPHILS # BLD AUTO: 9.12 K/UL — HIGH (ref 1.8–7.4)
NEUTROPHILS NFR BLD AUTO: 76.7 % — SIGNIFICANT CHANGE UP (ref 43–77)
NITRITE UR-MCNC: NEGATIVE — SIGNIFICANT CHANGE UP
PH UR: 6 — SIGNIFICANT CHANGE UP (ref 5–8)
PLATELET # BLD AUTO: 236 K/UL — SIGNIFICANT CHANGE UP (ref 150–400)
POTASSIUM SERPL-MCNC: 3.9 MMOL/L — SIGNIFICANT CHANGE UP (ref 3.5–5.3)
POTASSIUM SERPL-SCNC: 3.9 MMOL/L — SIGNIFICANT CHANGE UP (ref 3.5–5.3)
PROT SERPL-MCNC: 6.7 GM/DL — SIGNIFICANT CHANGE UP (ref 6–8.3)
PROT UR-MCNC: NEGATIVE MG/DL — SIGNIFICANT CHANGE UP
RBC # BLD: 3.66 M/UL — LOW (ref 3.8–5.2)
RBC # FLD: 12.3 % — SIGNIFICANT CHANGE UP (ref 10.3–14.5)
SARS-COV-2 RNA SPEC QL NAA+PROBE: SIGNIFICANT CHANGE UP
SODIUM SERPL-SCNC: 135 MMOL/L — SIGNIFICANT CHANGE UP (ref 135–145)
SP GR SPEC: 1.01 — SIGNIFICANT CHANGE UP (ref 1.01–1.02)
UROBILINOGEN FLD QL: NEGATIVE MG/DL — SIGNIFICANT CHANGE UP
WBC # BLD: 11.91 K/UL — HIGH (ref 3.8–10.5)
WBC # FLD AUTO: 11.91 K/UL — HIGH (ref 3.8–10.5)

## 2020-07-14 PROCEDURE — 99231 SBSQ HOSP IP/OBS SF/LOW 25: CPT

## 2020-07-14 RX ADMIN — QUETIAPINE FUMARATE 100 MILLIGRAM(S): 200 TABLET, FILM COATED ORAL at 20:36

## 2020-07-14 RX ADMIN — ESCITALOPRAM OXALATE 10 MILLIGRAM(S): 10 TABLET, FILM COATED ORAL at 09:12

## 2020-07-14 RX ADMIN — Medication 75 MICROGRAM(S): at 06:20

## 2020-07-14 RX ADMIN — Medication 2 MILLIGRAM(S): at 09:11

## 2020-07-14 RX ADMIN — Medication 325 MILLIGRAM(S): at 09:02

## 2020-07-14 RX ADMIN — LOSARTAN POTASSIUM 100 MILLIGRAM(S): 100 TABLET, FILM COATED ORAL at 09:12

## 2020-07-14 NOTE — PROGRESS NOTE BEHAVIORAL HEALTH - NSBHFUPINTERVALHXFT_PSY_A_CORE
Pt daughter was saying that the pt has been only here for the last two to three days but pt has been here since July 2.   Had placed a call to Dr Rucker but had yet to get a call back but the pt now indicated that "best to text him. " . Either way the pt with initial added anxiety and periods of mood lability . and still not call back with Dr Rucker so needed to decrease the Lexapro to 10 mg to decrease the mood lability and the pt is less angst . Still awaiting a bed at [facility] for ECT. Pt's daughter was upset that the dose of the Lexapro was decreased to 10 mg which is the dose that the pt indicated was the dose that Dr Rajput prescribed for treating the anxiety.  Daughter would like Dr Choudhary to consider taking over the care of the pt . Pt herself was very gracious and apologetic for daughter's outburst, but will still attempt to switch the management of the patients care so that there would be less extraneous issues.    07/14/20: Patient was seen today AM. She prefers to stay indoors, mood seems anxious, she developed a fever of 100.2-100.6, she received Tylenol and feels a little better later. She was later suggested to have some blood work as suggested by Hospitalist. CBC/Chemistry/Blood and Urine C/S and also U-A stat. No meds changes for now.

## 2020-07-14 NOTE — PROGRESS NOTE BEHAVIORAL HEALTH - NSBHCHARTREVIEWVS_PSY_A_CORE FT
Vital Signs Last 24 Hrs  T(C): 37.9 (14 Jul 2020 10:31), Max: 38.1 (14 Jul 2020 08:39)  T(F): 100.2 (14 Jul 2020 10:31), Max: 100.6 (14 Jul 2020 08:39)  HR: --  BP: --  BP(mean): --  RR: 12 (14 Jul 2020 08:39) (12 - 12)  SpO2: 97% (14 Jul 2020 08:39) (97% - 97%)

## 2020-07-15 ENCOUNTER — INPATIENT (INPATIENT)
Facility: HOSPITAL | Age: 80
LOS: 15 days | Discharge: ANOTHER HOSPITAL | End: 2020-07-31
Attending: PSYCHIATRY & NEUROLOGY | Admitting: PSYCHIATRY & NEUROLOGY
Payer: MEDICARE

## 2020-07-15 VITALS — TEMPERATURE: 100 F | WEIGHT: 130.07 LBS | HEIGHT: 60 IN

## 2020-07-15 VITALS — OXYGEN SATURATION: 98 % | TEMPERATURE: 100 F | RESPIRATION RATE: 14 BRPM

## 2020-07-15 DIAGNOSIS — F30.9 MANIC EPISODE, UNSPECIFIED: ICD-10-CM

## 2020-07-15 LAB
APPEARANCE UR: ABNORMAL
BILIRUB UR-MCNC: NEGATIVE — SIGNIFICANT CHANGE UP
COLOR SPEC: YELLOW — SIGNIFICANT CHANGE UP
DIFF PNL FLD: ABNORMAL
GLUCOSE UR QL: NEGATIVE MG/DL — SIGNIFICANT CHANGE UP
KETONES UR-MCNC: NEGATIVE — SIGNIFICANT CHANGE UP
LEUKOCYTE ESTERASE UR-ACNC: ABNORMAL
NITRITE UR-MCNC: NEGATIVE — SIGNIFICANT CHANGE UP
PH UR: 5 — SIGNIFICANT CHANGE UP (ref 5–8)
PROT UR-MCNC: NEGATIVE MG/DL — SIGNIFICANT CHANGE UP
SP GR SPEC: 1.01 — SIGNIFICANT CHANGE UP (ref 1.01–1.02)
UROBILINOGEN FLD QL: NEGATIVE MG/DL — SIGNIFICANT CHANGE UP

## 2020-07-15 PROCEDURE — 99233 SBSQ HOSP IP/OBS HIGH 50: CPT

## 2020-07-15 PROCEDURE — 99239 HOSP IP/OBS DSCHRG MGMT >30: CPT

## 2020-07-15 PROCEDURE — 99222 1ST HOSP IP/OBS MODERATE 55: CPT

## 2020-07-15 PROCEDURE — 71045 X-RAY EXAM CHEST 1 VIEW: CPT | Mod: 26

## 2020-07-15 RX ORDER — QUETIAPINE FUMARATE 200 MG/1
100 TABLET, FILM COATED ORAL AT BEDTIME
Refills: 0 | Status: DISCONTINUED | OUTPATIENT
Start: 2020-07-15 | End: 2020-07-31

## 2020-07-15 RX ORDER — HALOPERIDOL DECANOATE 100 MG/ML
5 INJECTION INTRAMUSCULAR EVERY 4 HOURS
Refills: 0 | Status: DISCONTINUED | OUTPATIENT
Start: 2020-07-15 | End: 2020-07-15

## 2020-07-15 RX ORDER — HALOPERIDOL DECANOATE 100 MG/ML
2 INJECTION INTRAMUSCULAR EVERY 4 HOURS
Refills: 0 | Status: DISCONTINUED | OUTPATIENT
Start: 2020-07-15 | End: 2020-07-31

## 2020-07-15 RX ORDER — HALOPERIDOL DECANOATE 100 MG/ML
2 INJECTION INTRAMUSCULAR ONCE
Refills: 0 | Status: DISCONTINUED | OUTPATIENT
Start: 2020-07-15 | End: 2020-07-31

## 2020-07-15 RX ORDER — AMLODIPINE BESYLATE 2.5 MG/1
1 TABLET ORAL
Qty: 0 | Refills: 0 | DISCHARGE
Start: 2020-07-15

## 2020-07-15 RX ORDER — CEFUROXIME AXETIL 250 MG
1 TABLET ORAL
Qty: 0 | Refills: 0 | DISCHARGE
Start: 2020-07-15

## 2020-07-15 RX ORDER — CEFUROXIME AXETIL 250 MG
250 TABLET ORAL ONCE
Refills: 0 | Status: COMPLETED | OUTPATIENT
Start: 2020-07-15 | End: 2020-07-15

## 2020-07-15 RX ORDER — CEFUROXIME AXETIL 250 MG
250 TABLET ORAL EVERY 12 HOURS
Refills: 0 | Status: COMPLETED | OUTPATIENT
Start: 2020-07-15 | End: 2020-07-20

## 2020-07-15 RX ORDER — LEVOTHYROXINE SODIUM 125 MCG
75 TABLET ORAL DAILY
Refills: 0 | Status: DISCONTINUED | OUTPATIENT
Start: 2020-07-15 | End: 2020-07-31

## 2020-07-15 RX ORDER — ESCITALOPRAM OXALATE 10 MG/1
1 TABLET, FILM COATED ORAL
Qty: 0 | Refills: 0 | DISCHARGE
Start: 2020-07-15

## 2020-07-15 RX ORDER — CLONAZEPAM 1 MG
0.25 TABLET ORAL AT BEDTIME
Refills: 0 | Status: DISCONTINUED | OUTPATIENT
Start: 2020-07-15 | End: 2020-07-22

## 2020-07-15 RX ORDER — ESCITALOPRAM OXALATE 10 MG/1
10 TABLET, FILM COATED ORAL DAILY
Refills: 0 | Status: DISCONTINUED | OUTPATIENT
Start: 2020-07-15 | End: 2020-07-31

## 2020-07-15 RX ORDER — QUETIAPINE FUMARATE 200 MG/1
1 TABLET, FILM COATED ORAL
Qty: 0 | Refills: 0 | DISCHARGE
Start: 2020-07-15

## 2020-07-15 RX ORDER — ACETAMINOPHEN 500 MG
1 TABLET ORAL
Qty: 0 | Refills: 0 | DISCHARGE
Start: 2020-07-15

## 2020-07-15 RX ORDER — POLYETHYLENE GLYCOL 3350 17 G/17G
17 POWDER, FOR SOLUTION ORAL DAILY
Refills: 0 | Status: DISCONTINUED | OUTPATIENT
Start: 2020-07-15 | End: 2020-07-31

## 2020-07-15 RX ORDER — CEFUROXIME AXETIL 250 MG
250 TABLET ORAL EVERY 12 HOURS
Refills: 0 | Status: DISCONTINUED | OUTPATIENT
Start: 2020-07-15 | End: 2020-07-15

## 2020-07-15 RX ORDER — ONDANSETRON 8 MG/1
1 TABLET, FILM COATED ORAL
Qty: 0 | Refills: 0 | DISCHARGE
Start: 2020-07-15

## 2020-07-15 RX ORDER — LOSARTAN POTASSIUM 100 MG/1
1 TABLET, FILM COATED ORAL
Qty: 0 | Refills: 0 | DISCHARGE
Start: 2020-07-15

## 2020-07-15 RX ORDER — SENNA PLUS 8.6 MG/1
2 TABLET ORAL
Qty: 0 | Refills: 0 | DISCHARGE
Start: 2020-07-15

## 2020-07-15 RX ORDER — LEVOTHYROXINE SODIUM 125 MCG
1 TABLET ORAL
Qty: 0 | Refills: 0 | DISCHARGE
Start: 2020-07-15

## 2020-07-15 RX ORDER — HALOPERIDOL DECANOATE 100 MG/ML
5 INJECTION INTRAMUSCULAR ONCE
Refills: 0 | Status: DISCONTINUED | OUTPATIENT
Start: 2020-07-15 | End: 2020-07-15

## 2020-07-15 RX ORDER — POLYETHYLENE GLYCOL 3350 17 G/17G
17 POWDER, FOR SOLUTION ORAL
Qty: 0 | Refills: 0 | DISCHARGE
Start: 2020-07-15

## 2020-07-15 RX ORDER — ACETAMINOPHEN 500 MG
325 TABLET ORAL EVERY 6 HOURS
Refills: 0 | Status: DISCONTINUED | OUTPATIENT
Start: 2020-07-15 | End: 2020-07-31

## 2020-07-15 RX ORDER — SENNA PLUS 8.6 MG/1
2 TABLET ORAL AT BEDTIME
Refills: 0 | Status: DISCONTINUED | OUTPATIENT
Start: 2020-07-15 | End: 2020-07-31

## 2020-07-15 RX ORDER — AMLODIPINE BESYLATE 2.5 MG/1
10 TABLET ORAL DAILY
Refills: 0 | Status: DISCONTINUED | OUTPATIENT
Start: 2020-07-15 | End: 2020-07-31

## 2020-07-15 RX ORDER — LOSARTAN POTASSIUM 100 MG/1
100 TABLET, FILM COATED ORAL DAILY
Refills: 0 | Status: DISCONTINUED | OUTPATIENT
Start: 2020-07-15 | End: 2020-07-31

## 2020-07-15 RX ORDER — ONDANSETRON 8 MG/1
4 TABLET, FILM COATED ORAL EVERY 8 HOURS
Refills: 0 | Status: DISCONTINUED | OUTPATIENT
Start: 2020-07-15 | End: 2020-07-31

## 2020-07-15 RX ORDER — CLONAZEPAM 1 MG
1 TABLET ORAL
Qty: 0 | Refills: 0 | DISCHARGE
Start: 2020-07-15

## 2020-07-15 RX ADMIN — Medication 0.25 MILLIGRAM(S): at 21:07

## 2020-07-15 RX ADMIN — Medication 75 MICROGRAM(S): at 05:25

## 2020-07-15 RX ADMIN — Medication 325 MILLIGRAM(S): at 10:33

## 2020-07-15 RX ADMIN — Medication 325 MILLIGRAM(S): at 08:05

## 2020-07-15 RX ADMIN — Medication 250 MILLIGRAM(S): at 21:31

## 2020-07-15 RX ADMIN — ESCITALOPRAM OXALATE 10 MILLIGRAM(S): 10 TABLET, FILM COATED ORAL at 09:16

## 2020-07-15 RX ADMIN — Medication 250 MILLIGRAM(S): at 13:24

## 2020-07-15 RX ADMIN — Medication 325 MILLIGRAM(S): at 08:06

## 2020-07-15 RX ADMIN — LOSARTAN POTASSIUM 100 MILLIGRAM(S): 100 TABLET, FILM COATED ORAL at 09:16

## 2020-07-15 RX ADMIN — Medication 25 MILLIGRAM(S): at 21:07

## 2020-07-15 RX ADMIN — QUETIAPINE FUMARATE 100 MILLIGRAM(S): 200 TABLET, FILM COATED ORAL at 21:07

## 2020-07-15 NOTE — PROGRESS NOTE BEHAVIORAL HEALTH - THOUGHT PROCESS
Illogical/Impaired reasoning/Circumstantial/Tangential
Circumstantial/Impaired reasoning/Illogical/Tangential
Tangential/Circumstantial/Illogical/Impaired reasoning
Overinclusive/Circumstantial
Overinclusive/Circumstantial
Illogical/Impaired reasoning/Circumstantial/Tangential
Circumstantial/Tangential/Illogical/Impaired reasoning
Illogical/Impaired reasoning/Circumstantial/Tangential
Circumstantial/Tangential/Illogical/Impaired reasoning
Illogical/Impaired reasoning/Circumstantial/Tangential
Circumstantial/Impaired reasoning/Tangential/Illogical
Circumstantial/Illogical/Impaired reasoning/Tangential
Tangential/Circumstantial/Illogical/Impaired reasoning

## 2020-07-15 NOTE — PROGRESS NOTE BEHAVIORAL HEALTH - AFFECT RANGE
Labile
Constricted
Labile
Constricted
Labile

## 2020-07-15 NOTE — PROGRESS NOTE BEHAVIORAL HEALTH - PROBLEM SELECTOR PROBLEM 2
NONA (generalized anxiety disorder)

## 2020-07-15 NOTE — PROGRESS NOTE BEHAVIORAL HEALTH - NSBHFUPTYPE_PSY_A_CORE
Inpatient
Inpatient-On Service Note
Inpatient

## 2020-07-15 NOTE — DISCHARGE NOTE BEHAVIORAL HEALTH - NSBHDCTESTSFT_PSY_A_CORE
CBC; Chemistry ;EKG; U-A; U-Tox; Lipid profile; HBA1-C; EKG; CXR--WNL  COVID-19--Negative on 07/14/2020  Blood and Urine C-S in process

## 2020-07-15 NOTE — PROGRESS NOTE BEHAVIORAL HEALTH - OTHER
"I'm going to die here "

## 2020-07-15 NOTE — PROGRESS NOTE BEHAVIORAL HEALTH - NSBHPTASSESSDT_PSY_A_CORE
07-Jul-2020 12:55
09-Jul-2020 09:13
12-Jul-2020 13:21
13-Jul-2020 13:09
14-Jul-2020 13:51
15-Jul-2020 12:19
10-Jul-2020 20:22
06-Jul-2020 12:01
11-Jul-2020 13:48
04-Jul-2020 16:37
03-Jul-2020 16:34
08-Jul-2020 15:42
05-Jul-2020 14:48

## 2020-07-15 NOTE — PROGRESS NOTE BEHAVIORAL HEALTH - PROBLEM SELECTOR PLAN 1
tegretal  consultation with ECT specialist
Patient
tegretal  consultation with ECT specialist

## 2020-07-15 NOTE — PROGRESS NOTE BEHAVIORAL HEALTH - PROBLEM SELECTOR PROBLEM 1
Bipolar disorder, current episode depressed, severe, without psychotic features

## 2020-07-15 NOTE — PROGRESS NOTE BEHAVIORAL HEALTH - PROBLEM SELECTOR PLAN 3
pt denies any suicidal intent or plan

## 2020-07-15 NOTE — PROGRESS NOTE BEHAVIORAL HEALTH - THOUGHT CONTENT
Other/Ruminations/Preoccupations/Delusions/Guilt/Hopelessness/Suicidality
Preoccupations/Guilt/Suicidality/Ruminations/Hopelessness/Other/Delusions
Guilt/Ruminations/Hopelessness/Suicidality/Preoccupations/Other/Delusions
Preoccupations/Hopelessness/Suicidality/Ruminations/Guilt
Guilt/Preoccupations/Hopelessness/Suicidality/Ruminations
Delusions/Other/Ruminations/Preoccupations/Guilt/Suicidality/Hopelessness
Delusions/Guilt/Suicidality/Other/Preoccupations/Ruminations/Hopelessness
Guilt/Preoccupations/Hopelessness/Suicidality/Delusions/Ruminations/Other
Suicidality/Ruminations/Preoccupations/Hopelessness/Other/Delusions/Guilt
Hopelessness/Guilt/Preoccupations/Suicidality/Other/Delusions/Ruminations
Other/Delusions/Ruminations/Hopelessness/Preoccupations/Guilt/Suicidality
Hopelessness/Suicidality/Other/Delusions/Ruminations/Preoccupations/Guilt
Other/Preoccupations/Ruminations/Hopelessness/Guilt/Suicidality/Delusions

## 2020-07-15 NOTE — PROGRESS NOTE BEHAVIORAL HEALTH - AFFECT QUALITY
Anxious/Depressed
Depressed/Anxious
Anxious/Depressed
Anxious/Depressed
Depressed/Anxious
Anxious/Depressed
Depressed/Anxious

## 2020-07-15 NOTE — DISCHARGE NOTE BEHAVIORAL HEALTH - NSBHDCMEDSFT_PSY_A_CORE
Lexapro 10 mg  Seroquel 100 mg HS--Limited response Lexapro 10 mg  Seroquel 100 mg HS--Limited response  Patient educated to not stop any medication unless otherwise told to do so by outpatient provider

## 2020-07-15 NOTE — PROGRESS NOTE BEHAVIORAL HEALTH - AXIS III
HTN, HLD hypothyroid
HTN, HLD
HTN, HLD hypothyroid
HTN, HLD hypothyroid
HTN, HLD
HTN, HLD hypothyroid

## 2020-07-15 NOTE — PROGRESS NOTE BEHAVIORAL HEALTH - NSBHADMITIPBHPROVFT_PSY_A_CORE
Dr Micah Rucker

## 2020-07-15 NOTE — DISCHARGE NOTE BEHAVIORAL HEALTH - MEDICATION SUMMARY - MEDICATIONS TO TAKE
I will START or STAY ON the medications listed below when I get home from the hospital:    acetaminophen 325 mg oral tablet  -- 1 tab(s) by mouth every 6 hours, As needed, Moderate Pain (4 - 6)  -- Indication: For Temperature    losartan 100 mg oral tablet  -- 1 tab(s) by mouth once a day  -- Indication: For HTN    pregabalin 25 mg oral capsule  -- 1 cap(s) by mouth 2 times a day  -- Indication: For Pain    LORazepam 1 mg oral tablet  -- 1 tab(s) by mouth every 8 hours, As needed, Anxiety  -- Indication: For Agitation    clonazePAM 0.25 mg oral tablet, disintegrating  -- 1 tab(s) by mouth once a day (at bedtime)  -- Indication: For Anxiety    escitalopram 10 mg oral tablet  -- 1 tab(s) by mouth once a day  -- Indication: For Mood    ondansetron 4 mg oral tablet, disintegrating  -- 1 tab(s) by mouth every 8 hours, As needed, Nausea and/or Vomiting  -- Indication: For Nausea    QUEtiapine 100 mg oral tablet  -- 1 tab(s) by mouth once a day (at bedtime)  -- Indication: For Mood    amLODIPine 10 mg oral tablet  -- 1 tab(s) by mouth once a day  -- Indication: For HTN    cefuroxime 250 mg oral tablet  -- 1 tab(s) by mouth every 12 hours  -- Indication: For UTI    polyethylene glycol 3350 oral powder for reconstitution  -- 17 gram(s) by mouth once a day, As needed, Constipation  -- Indication: For Constipation    senna oral tablet  -- 2 tab(s) by mouth once a day (at bedtime), As needed, constipation  -- Indication: For Constipation    levothyroxine 75 mcg (0.075 mg) oral tablet  -- 1 tab(s) by mouth once a day  -- Indication: For Hypo-Thyroid

## 2020-07-15 NOTE — PROGRESS NOTE BEHAVIORAL HEALTH - RISK ASSESSMENT
moderate risk:  Acute pt with paranoid delusions, labile moods , anxiety ,impaired reasoning,impulsive HI and SI  mitigating factors: pt denies any suicidal intent or real plan , willing to consider ECT treatment  protective has a family , ECT was helpful in the past  chronic: prior psych hosp,
moderate risk:  Acute pt with paranoid delusions, labile moods , anxiety ,impaired reasoning,impulsive HI and SI  mitigating factors: pt denies any suicidal intent or real plan   protective has a family , ECT was helpful in the past  chronic: prior psych hosp,
moderate risk:  Acute pt with paranoid delusions, labile moods , anxiety ,impaired reasoning,impulsive HI and SI  mitigating factors: pt denies any suicidal intent or real plan , willing to consider ECT treatment  protective has a family , ECT was helpful in the past  chronic: prior psych hosp,
moderate risk:  Acute pt with paranoid delusions, labile moods , anxiety ,impaired reasoning,impulsive HI and SI  mitigating factors: pt denies any suicidal intent or real plan   protective has a family , ECT was helpful in the past  chronic: prior psych hosp,

## 2020-07-15 NOTE — PROGRESS NOTE BEHAVIORAL HEALTH - NSBHCHARTREVIEWLAB_PSY_A_CORE FT
Thyroid Stimulating Hormone, Serum: 2.28 uU/mL (07.03.20 @ 08:42)
Urine Microscopic-Add On (NC) (07.06.20 @ 08:30)    Red Blood Cell - Urine: 0-2 /HPF    White Blood Cell - Urine: 0-2    Bacteria: Few    Comment - Urine: slight mucous    Epithelial Cells: Occasional
Thyroid Stimulating Hormone, Serum: 2.28 uU/mL (07.03.20 @ 08:42)
Urine Microscopic-Add On (NC) (07.06.20 @ 08:30)    Red Blood Cell - Urine: 0-2 /HPF    White Blood Cell - Urine: 0-2    Bacteria: Few    Comment - Urine: slight mucous    Epithelial Cells: Occasional
Urine Microscopic-Add On (NC) (07.06.20 @ 08:30)    Red Blood Cell - Urine: 0-2 /HPF    White Blood Cell - Urine: 0-2    Bacteria: Few    Comment - Urine: slight mucous    Epithelial Cells: Occasional
Thyroid Stimulating Hormone, Serum: 2.28 uU/mL (07.03.20 @ 08:42)
Urine Microscopic-Add On (NC) (07.06.20 @ 08:30)    Red Blood Cell - Urine: 0-2 /HPF    White Blood Cell - Urine: 0-2    Bacteria: Few    Comment - Urine: slight mucous    Epithelial Cells: Occasional
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
## 2020-07-15 NOTE — PROGRESS NOTE BEHAVIORAL HEALTH - PRIMARY DX
Bipolar disorder, current episode depressed, severe, without psychotic features

## 2020-07-15 NOTE — PROGRESS NOTE BEHAVIORAL HEALTH - NSBHADMITIPOBSFT_PSY_A_CORE
pt with suicidal ideation but denies any intent or plan
pt with suicidal ideation but denies any intnet or plan
pt with suicidal ideation but denies any intent or plan
pt with suicidal ideation but denies any intnet or plan
pt with suicidal ideation but denies any intent or plan

## 2020-07-15 NOTE — PROGRESS NOTE BEHAVIORAL HEALTH - SECONDARY DX1
NONA (generalized anxiety disorder)
NONA (generalized anxiety disorder)
Suicidal ideation
NONA (generalized anxiety disorder)
Suicidal ideation
NONA (generalized anxiety disorder)
Suicidal ideation
NONA (generalized anxiety disorder)
Suicidal ideation
NONA (generalized anxiety disorder)

## 2020-07-15 NOTE — PROGRESS NOTE ADULT - ASSESSMENT
80 y/o female with PMHx of HTN, Hypothyroidism and depression presented to ED with suicidal / homicidal ideation.  Patient admitted to [address] [place] [address].  Hospitalist f/u requested to r/o UTI.     #Asymptomatic Bacteruria:    Patient denies sx of UTI-- no frequency/ dysuria.    UA unremarkable on admission-- no WBC.    Urine culture with >100,000 aerococcus.    Repeat UA.  If suggestive of pyruia (+WBC), would treat with amoxicillin 500 TID for 3 days.      #Suicidal/ Homicidal Ideation:      Further management as per psych.      #Hypothyroidism:    TSH WNL.  Cont synthroid.        Will f/u repeat UA today.    Thanks for the consult.
78 y/o female with PMHx of HTN, Hypothyroidism and depression presented to ED with suicidal / homicidal ideation.  Patient admitted to [address] [place] [address].  Hospitalist f/u requested for eval of fever.      #Fever:    Labs noted-- slight elevated WBC @ 11.    Tmax 100.6.    CXR performed-- negative by my read.  Await official read.    UA initially negative; however, now with slight WBC/ leuk est.    Urine culture from admission with >100,000 aerococcus.    Repeat urine culture.    Start ceftin 250 BID for possible UTI in the setting of fevers.      #Frequent stools:    No diarrhea.    Follow for now.    Patient was given miralax/ senna for constipation-- likely just loose related to this.      #Suicidal/ Homicidal Ideation:      Further management as per psych.    Possible ECT treatment.      #Hypothyroidism:    TSH WNL.  Cont synthroid.

## 2020-07-15 NOTE — PROGRESS NOTE BEHAVIORAL HEALTH - NSBHFUPVIOL_PSY_A_CORE
yes
none known
yes
yes
none known
yes
none known
none known

## 2020-07-15 NOTE — PROGRESS NOTE BEHAVIORAL HEALTH - NSBHLEGALSTATUS_PSY_A_CORE
9.39 (Emergency)
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
## 2020-07-15 NOTE — PROGRESS NOTE BEHAVIORAL HEALTH - SECONDARY DX2
PTSD (post-traumatic stress disorder)
Suicidal ideation
NONA (generalized anxiety disorder)
Suicidal ideation
PTSD (post-traumatic stress disorder)
NONA (generalized anxiety disorder)
Suicidal ideation
Suicidal ideation
PTSD (post-traumatic stress disorder)
Suicidal ideation
NONA (generalized anxiety disorder)
Suicidal ideation
NONA (generalized anxiety disorder)

## 2020-07-15 NOTE — CHART NOTE - NSCHARTNOTEFT_GEN_A_CORE
Screening Medical Evaluation  Patient Admitted from: [facility] admitting diagnosis: Manic disorder, single episode    PAST MEDICAL & SURGICAL HISTORY:  Suicidal ideation  Hypertension  No significant past surgical history        Allergies    No Known Allergies    Intolerances        Social History:     FAMILY HISTORY:  No pertinent family history in first degree relatives      MEDICATIONS  (STANDING):  amLODIPine   Tablet 10 milliGRAM(s) Oral daily  cefuroxime   Tablet 250 milliGRAM(s) Oral every 12 hours  clonazePAM Oral Disintegrating Tablet 0.25 milliGRAM(s) Oral at bedtime  escitalopram 10 milliGRAM(s) Oral daily  levothyroxine 75 MICROGram(s) Oral daily  losartan 100 milliGRAM(s) Oral daily  pregabalin 25 milliGRAM(s) Oral two times a day  QUEtiapine 100 milliGRAM(s) Oral at bedtime    MEDICATIONS  (PRN):  acetaminophen   Tablet .. 325 milliGRAM(s) Oral every 6 hours PRN Moderate Pain (4 - 6)  haloperidol     Tablet 2 milliGRAM(s) Oral every 4 hours PRN Agitation  haloperidol    Injectable 2 milliGRAM(s) IntraMuscular once PRN Agitation  LORazepam     Tablet 1 milliGRAM(s) Oral every 8 hours PRN Anxiety  LORazepam   Injectable 1 milliGRAM(s) IntraMuscular once PRN Agitation  ondansetron   Disintegrating Tablet 4 milliGRAM(s) Oral every 8 hours PRN Nausea and/or Vomiting  polyethylene glycol 3350 17 Gram(s) Oral daily PRN Constipation  senna 2 Tablet(s) Oral at bedtime PRN constipation      Vital Signs Last 24 Hrs  T(C): 37.3 (15 Jul 2020 20:50), Max: 37.8 (15 Jul 2020 19:35)  T(F): 99.1 (15 Jul 2020 20:50), Max: 100 (15 Jul 2020 19:35)  HR: 63  BP: 139/92  BP(mean): --  RR: 14 (15 Jul 2020 07:43) (14 - 14)  SpO2: 98% (15 Jul 2020 07:43) (98% - 98%)  CAPILLARY BLOOD GLUCOSE            PHYSICAL EXAM:  GENERAL: NAD, well-developed  HEAD:  Atraumatic, Normocephalic  EYES: EOMI, PERRLA, conjunctiva and sclera clear  NECK: Supple.  CHEST/LUNG: Clear to auscultation bilaterally; No wheeze  HEART: Regular rate and rhythm; No murmurs, rubs, or gallops  ABDOMEN: Soft, Nontender, Nondistended; Bowel sounds present  EXTREMITIES:  2+ Peripheral Pulses, No cyanosis, or edema  PSYCH: AAOx3  NEUROLOGY: non-focal  SKIN: No rashes or lesions    LABS:                        11.8   11.91 )-----------( 236      ( 2020 14:16 )             36.5     07-14    135  |  99  |  12  ----------------------------<  89  3.9   |  30  |  0.68    Ca    8.8      2020 14:16    TPro  6.7  /  Alb  2.9<L>  /  TBili  0.4  /  DBili  x   /  AST  9<L>  /  ALT  15  /  AlkPhos  78  07-14          Urinalysis Basic - ( 15 Jul 2020 09:46 )    Color: Yellow / Appearance: Slightly Turbid / S.015 / pH: x  Gluc: x / Ketone: Negative  / Bili: Negative / Urobili: Negative mg/dL   Blood: x / Protein: Negative mg/dL / Nitrite: Negative   Leuk Esterase: Trace / RBC: Negative /HPF / WBC 0-2   Sq Epi: x / Non Sq Epi: x / Bacteria: x        RADIOLOGY & ADDITIONAL TESTS:    Assessment and Plan:  79 year old female presenting today from [facility] to [facility] with admititng diagnosis of Manic disorder, single episode with PMH of UTI, HTN, and hypothyroidism. Denies any fever, chills, headache, chest pain, SOB, abdominal pain, N/V/D/C, dysuria. Physical exam unremarkable. COVID19 negative.   1) UTI: Continue ceftin 250mg BIDx 5days.  2) HTN: Continue amlodipine 10mg oral daily, and losartan 100mg oral daily.  3) Pain: Continue pregablin 25mg oral BID.   4) Hypothryoidism: Continue synthroid 75mcg oral daily.  5) Manic disorder, single episode: Follow care plan as per primary team.

## 2020-07-15 NOTE — PROGRESS NOTE BEHAVIORAL HEALTH - NSBHFUPSUICINTERVALFT_PSY_A_CORE
pt with passive thoughts

## 2020-07-15 NOTE — DISCHARGE NOTE BEHAVIORAL HEALTH - PROVIDER TOKENS
FREE:[LAST:[[facility]],PHONE:[(   )    -],FAX:[(   )    -],ADDRESS:[[address] [place] [address]; [id number]  07/15/2020]]

## 2020-07-15 NOTE — CHART NOTE - NSCHARTNOTEFT_GEN_A_CORE
Patient accepted to [facility], Dr. Trotter. All paperwork completed and faxed.  Original legals sent with patient. EMS set up.  No auth required. Pt aware of transfer.  Family informed.

## 2020-07-15 NOTE — PROGRESS NOTE BEHAVIORAL HEALTH - SECONDARY DX3
PTSD (post-traumatic stress disorder)

## 2020-07-15 NOTE — PROGRESS NOTE BEHAVIORAL HEALTH - PROBLEM SELECTOR PROBLEM 3
Suicidal ideation

## 2020-07-15 NOTE — DISCHARGE NOTE BEHAVIORAL HEALTH - NSBHDCSWCOMMENTSFT_PSY_A_CORE
Patient educated about the signs and symptoms of depression, the need and resources for continuing in the appropriate level of psychiatric treatment and the need to continue taking the above medications as prescribed unless directed otherwise by her next treatment provider.

## 2020-07-15 NOTE — PROGRESS NOTE BEHAVIORAL HEALTH - ESTIMATED DISCHARGE DATE
30-Jul-2020
15-Jul-2020
30-Jul-2020

## 2020-07-15 NOTE — DISCHARGE NOTE BEHAVIORAL HEALTH - CARE PROVIDER_API CALL
[facility],   [address] [place] [address]; [place]; [address]  07/15/2020  Phone: (   )    -  Fax: (   )    -  Follow Up Time:

## 2020-07-15 NOTE — PROGRESS NOTE BEHAVIORAL HEALTH - NSBHCHARTREVIEWVS_PSY_A_CORE FT
Vital Signs Last 24 Hrs  T(C): 37.7 (15 Jul 2020 07:43), Max: 37.7 (15 Jul 2020 07:43)  T(F): 99.8 (15 Jul 2020 07:43), Max: 99.8 (15 Jul 2020 07:43)  HR: --  BP: --  BP(mean): --  RR: 14 (15 Jul 2020 07:43) (14 - 14)  SpO2: 98% (15 Jul 2020 07:43) (98% - 98%)

## 2020-07-15 NOTE — PROGRESS NOTE BEHAVIORAL HEALTH - NSBHFUPINTERVALHXFT_PSY_A_CORE
Pt daughter was saying that the pt has been only here for the last two to three days but pt has been here since July 2.   Had placed a call to Dr Rucker but had yet to get a call back but the pt now indicated that "best to text him. " . Either way the pt with initial added anxiety and periods of mood lability . and still not call back with Dr Rucker so needed to decrease the Lexapro to 10 mg to decrease the mood lability and the pt is less angst . Still awaiting a bed at [facility] for ECT. Pt's daughter was upset that the dose of the Lexapro was decreased to 10 mg which is the dose that the pt indicated was the dose that Dr Rajput prescribed for treating the anxiety.  Daughter would like Dr Choudhary to consider taking over the care of the pt . Pt herself was very gracious and apologetic for daughter's outburst, but will still attempt to switch the management of the patients care so that there would be less extraneous issues.    07/15/20: Patient was seen today AM. She prefers to stay indoors, mood seems less anxious, her fever is down to 99.8 received Ceftin 250 mg after seen by Medicine Q-12, and still anticipating to go to [facility] for ECT treatment. Discussed the option of RUL ECT for decreased memory loss. She seemed OK after discussing RUL ECT for treatment at [facility]. CBC/Chem are WNL. To be transferred to [facility] once bed available.

## 2020-07-15 NOTE — PROGRESS NOTE BEHAVIORAL HEALTH - MOOD
Depressed/Anxious
Anxious/Depressed
Depressed/Anxious
Anxious/Depressed
Depressed/Anxious
Anxious/Depressed
Depressed/Anxious
Anxious/Depressed

## 2020-07-15 NOTE — DISCHARGE NOTE BEHAVIORAL HEALTH - NSBHDCHOUSINGFT_PSY_A_CORE
[facility] Patient is being transferred to [facility] on order of transfer to obtain ECT treatment.    [address] [facility] [place], [place]   [phone]

## 2020-07-15 NOTE — PROGRESS NOTE BEHAVIORAL HEALTH - NSBHCHARTREVIEWINVESTIGATE_PSY_A_CORE FT
Ventricular Rate 62 BPM    Atrial Rate 62 BPM    P-R Interval 144 ms    QRS Duration 130 ms    Q-T Interval 408 ms    QTC Calculation(Bezet) 414 ms    P Axis 27 degrees    R Axis 7 degrees    T Axis 14 degrees    Diagnosis Line Normal sinus rhythm  Right bundle branch block  Abnormal ECG  No previous ECGs available  Confirmed by Mike Gray MD ([id number]) on 7/2/2020 9:15:36 PM
QRS axis to [] ° and NSR at a rate of [] BPM. There was no atrial enlargement. There was no ventricular hypertrophy. There were no ST-T changes and all intervals were normal.
QRS axis to [] ° and NSR at a rate of [] BPM. There was no atrial enlargement. There was no ventricular hypertrophy. There were no ST-T changes and all intervals were normal.
Ventricular Rate 62 BPM    Atrial Rate 62 BPM    P-R Interval 144 ms    QRS Duration 130 ms    Q-T Interval 408 ms    QTC Calculation(Bezet) 414 ms    P Axis 27 degrees    R Axis 7 degrees    T Axis 14 degrees    Diagnosis Line Normal sinus rhythm  Right bundle branch block  Abnormal ECG  No previous ECGs available  Confirmed by Mike Gray MD ([id number]) on 7/2/2020 9:15:36 PM
Ventricular Rate 62 BPM    Atrial Rate 62 BPM    P-R Interval 144 ms    QRS Duration 130 ms    Q-T Interval 408 ms    QTC Calculation(Bezet) 414 ms    P Axis 27 degrees    R Axis 7 degrees    T Axis 14 degrees    Diagnosis Line Normal sinus rhythm  Right bundle branch block  Abnormal ECG  No previous ECGs available  Confirmed by Mike Gray MD ([id number]) on 7/2/2020 9:15:36 PM
Ventricular Rate 62 BPM    Atrial Rate 62 BPM    P-R Interval 144 ms    QRS Duration 130 ms    Q-T Interval 408 ms    QTC Calculation(Bezet) 414 ms    P Axis 27 degrees    R Axis 7 degrees    T Axis 14 degrees    Diagnosis Line Normal sinus rhythm  Right bundle branch block  Abnormal ECG  No previous ECGs available  Confirmed by Mike Gray MD ([id number]) on 7/2/2020 9:15:36 PM
QRS axis to [] ° and NSR at a rate of [] BPM. There was no atrial enlargement. There was no ventricular hypertrophy. There were no ST-T changes and all intervals were normal.
Ventricular Rate 62 BPM    Atrial Rate 62 BPM    P-R Interval 144 ms    QRS Duration 130 ms    Q-T Interval 408 ms    QTC Calculation(Bezet) 414 ms    P Axis 27 degrees    R Axis 7 degrees    T Axis 14 degrees    Diagnosis Line Normal sinus rhythm  Right bundle branch block  Abnormal ECG  No previous ECGs available  Confirmed by Mike Gray MD ([id number]) on 7/2/2020 9:15:36 PM
?ekg
Ventricular Rate 62 BPM    Atrial Rate 62 BPM    P-R Interval 144 ms    QRS Duration 130 ms    Q-T Interval 408 ms    QTC Calculation(Bezet) 414 ms    P Axis 27 degrees    R Axis 7 degrees    T Axis 14 degrees    Diagnosis Line Normal sinus rhythm  Right bundle branch block  Abnormal ECG  No previous ECGs available  Confirmed by Mike Gray MD ([id number]) on 7/2/2020 9:15:36 PM
QRS axis to [] ° and NSR at a rate of [] BPM. There was no atrial enlargement. There was no ventricular hypertrophy. There were no ST-T changes and all intervals were normal.

## 2020-07-16 LAB
CULTURE RESULTS: SIGNIFICANT CHANGE UP
SPECIMEN SOURCE: SIGNIFICANT CHANGE UP

## 2020-07-16 PROCEDURE — 99232 SBSQ HOSP IP/OBS MODERATE 35: CPT

## 2020-07-16 PROCEDURE — 99223 1ST HOSP IP/OBS HIGH 75: CPT

## 2020-07-16 RX ADMIN — ESCITALOPRAM OXALATE 10 MILLIGRAM(S): 10 TABLET, FILM COATED ORAL at 08:21

## 2020-07-16 RX ADMIN — Medication 25 MILLIGRAM(S): at 08:16

## 2020-07-16 RX ADMIN — Medication 25 MILLIGRAM(S): at 22:21

## 2020-07-16 RX ADMIN — LOSARTAN POTASSIUM 100 MILLIGRAM(S): 100 TABLET, FILM COATED ORAL at 08:22

## 2020-07-16 RX ADMIN — Medication 0.25 MILLIGRAM(S): at 22:21

## 2020-07-16 RX ADMIN — Medication 75 MICROGRAM(S): at 08:21

## 2020-07-16 RX ADMIN — QUETIAPINE FUMARATE 100 MILLIGRAM(S): 200 TABLET, FILM COATED ORAL at 22:21

## 2020-07-16 RX ADMIN — Medication 250 MILLIGRAM(S): at 22:21

## 2020-07-16 RX ADMIN — Medication 0.5 MILLIGRAM(S): at 13:23

## 2020-07-16 RX ADMIN — AMLODIPINE BESYLATE 10 MILLIGRAM(S): 2.5 TABLET ORAL at 08:21

## 2020-07-16 RX ADMIN — Medication 250 MILLIGRAM(S): at 08:31

## 2020-07-16 NOTE — CONSULT NOTE ADULT - SUBJECTIVE AND OBJECTIVE BOX
HPI: 79F w/Bipolar disorder, anxiety/depression, HTN and hypothyroidism     PAST MEDICAL & SURGICAL HISTORY:  Suicidal ideation  Hypertension  No significant past surgical history      Review of Systems:   CONSTITUTIONAL: No fever, weight loss, or fatigue  EYES: No eye pain, visual disturbances, or discharge  ENMT:  No difficulty hearing, tinnitus, vertigo; No sinus or throat pain  NECK: No pain or stiffness  RESPIRATORY: No cough, wheezing, chills or hemoptysis; No shortness of breath  CARDIOVASCULAR: No chest pain, palpitations, dizziness, or leg swelling  GASTROINTESTINAL: No abdominal or epigastric pain. No nausea, vomiting, or hematemesis; No diarrhea or constipation. No melena or hematochezia.  GENITOURINARY: No dysuria, frequency, hematuria, or incontinence  NEUROLOGICAL: No headaches, memory loss, loss of strength, numbness, or tremors  SKIN: No itching, burning, rashes, or lesions   LYMPH NODES: No enlarged glands  ENDOCRINE: No heat or cold intolerance; No hair loss  MUSCULOSKELETAL: No joint pain or swelling; No muscle, back, or extremity pain  HEME/LYMPH: No easy bruising, or bleeding gums  ALLERY AND IMMUNOLOGIC: No hives or eczema    Allergies    No Known Allergies    Intolerances        Social History:     FAMILY HISTORY:  No pertinent family history in first degree relatives      MEDICATIONS  (STANDING):  amLODIPine   Tablet 10 milliGRAM(s) Oral daily  cefuroxime   Tablet 250 milliGRAM(s) Oral every 12 hours  clonazePAM Oral Disintegrating Tablet 0.25 milliGRAM(s) Oral at bedtime  escitalopram 10 milliGRAM(s) Oral daily  levothyroxine 75 MICROGram(s) Oral daily  losartan 100 milliGRAM(s) Oral daily  pregabalin 25 milliGRAM(s) Oral two times a day  QUEtiapine 100 milliGRAM(s) Oral at bedtime    MEDICATIONS  (PRN):  acetaminophen   Tablet .. 325 milliGRAM(s) Oral every 6 hours PRN Moderate Pain (4 - 6)  haloperidol     Tablet 2 milliGRAM(s) Oral every 4 hours PRN Agitation  haloperidol    Injectable 2 milliGRAM(s) IntraMuscular once PRN Agitation  LORazepam     Tablet 1 milliGRAM(s) Oral every 8 hours PRN Anxiety  LORazepam   Injectable 1 milliGRAM(s) IntraMuscular once PRN Agitation  ondansetron   Disintegrating Tablet 4 milliGRAM(s) Oral every 8 hours PRN Nausea and/or Vomiting  polyethylene glycol 3350 17 Gram(s) Oral daily PRN Constipation  senna 2 Tablet(s) Oral at bedtime PRN constipation      Vital Signs Last 24 Hrs  T(C): 36.9 (2020 08:13), Max: 37.8 (15 Jul 2020 19:35)  T(F): 98.4 (2020 08:13), Max: 100 (15 Jul 2020 19:35)  HR: --  BP: --  BP(mean): --  RR: --  SpO2: --  CAPILLARY BLOOD GLUCOSE            PHYSICAL EXAM:  GENERAL: NAD, well-developed  HEAD:  Atraumatic, Normocephalic  EYES: EOMI, conjunctiva and sclera clear  NECK: Supple, No JVD  CHEST/LUNG: Clear to auscultation bilaterally; No wheeze  HEART: Regular rate and rhythm; No murmurs, rubs, or gallops  ABDOMEN: Soft, Nontender, Nondistended; Bowel sounds present  EXTREMITIES:  2+ Peripheral Pulses, No clubbing, cyanosis, or edema  NEUROLOGY: non-focal  SKIN: No rashes or lesions    LABS:                        11.8   11.91 )-----------( 236      ( 2020 14:16 )             36.5     07-14    135  |  99  |  12  ----------------------------<  89  3.9   |  30  |  0.68    Ca    8.8      2020 14:16    TPro  6.7  /  Alb  2.9<L>  /  TBili  0.4  /  DBili  x   /  AST  9<L>  /  ALT  15  /  AlkPhos  78  07-14          Urinalysis Basic - ( 15 Jul 2020 09:46 )    Color: Yellow / Appearance: Slightly Turbid / S.015 / pH: x  Gluc: x / Ketone: Negative  / Bili: Negative / Urobili: Negative mg/dL   Blood: x / Protein: Negative mg/dL / Nitrite: Negative   Leuk Esterase: Trace / RBC: Negative /HPF / WBC 0-2   Sq Epi: x / Non Sq Epi: x / Bacteria: x        EKG(personally reviewed):    RADIOLOGY & ADDITIONAL TESTS:    Imaging Personally Reviewed:    Consultant(s) Notes Reviewed:      Care Discussed with Consultants/Other Providers: HPI: 79F w/Bipolar disorder, anxiety/depression, HTN and hypothyroidism admitted to [facility] from -7/15, transferred to [facility] for ECT.  At [place], UCx on admission was +Aerococcus, UA negative. Pt was seen by medicine team, but no treatment started. Pt had low grade temp of 100.6 on , CBC w/WBC 11, UA was repeated and mildly positive with +LE, +bacteria, +epithelial cells. Pt started on Ceftin BID, no further fevers reported.     PAST MEDICAL & SURGICAL HISTORY:  Suicidal ideation  Hypertension  No significant past surgical history      Review of Systems:   CONSTITUTIONAL: No fever, weight loss, or fatigue  EYES: No eye pain, visual disturbances, or discharge  ENMT:  No difficulty hearing, tinnitus, vertigo; No sinus or throat pain  NECK: No pain or stiffness  RESPIRATORY: No cough, wheezing, chills or hemoptysis; No shortness of breath  CARDIOVASCULAR: No chest pain, palpitations, dizziness, or leg swelling  GASTROINTESTINAL: No abdominal or epigastric pain. No nausea, vomiting, or hematemesis; No diarrhea or constipation. No melena or hematochezia.  GENITOURINARY: No dysuria, frequency, hematuria, or incontinence  NEUROLOGICAL: No headaches, memory loss, loss of strength, numbness, or tremors  SKIN: No itching, burning, rashes, or lesions   LYMPH NODES: No enlarged glands  ENDOCRINE: No heat or cold intolerance; No hair loss  MUSCULOSKELETAL: No joint pain or swelling; No muscle, back, or extremity pain  HEME/LYMPH: No easy bruising, or bleeding gums  ALLERY AND IMMUNOLOGIC: No hives or eczema    Allergies    No Known Allergies    Intolerances        Social History:     FAMILY HISTORY:  No pertinent family history in first degree relatives      MEDICATIONS  (STANDING):  amLODIPine   Tablet 10 milliGRAM(s) Oral daily  cefuroxime   Tablet 250 milliGRAM(s) Oral every 12 hours  clonazePAM Oral Disintegrating Tablet 0.25 milliGRAM(s) Oral at bedtime  escitalopram 10 milliGRAM(s) Oral daily  levothyroxine 75 MICROGram(s) Oral daily  losartan 100 milliGRAM(s) Oral daily  pregabalin 25 milliGRAM(s) Oral two times a day  QUEtiapine 100 milliGRAM(s) Oral at bedtime    MEDICATIONS  (PRN):  acetaminophen   Tablet .. 325 milliGRAM(s) Oral every 6 hours PRN Moderate Pain (4 - 6)  haloperidol     Tablet 2 milliGRAM(s) Oral every 4 hours PRN Agitation  haloperidol    Injectable 2 milliGRAM(s) IntraMuscular once PRN Agitation  LORazepam     Tablet 1 milliGRAM(s) Oral every 8 hours PRN Anxiety  LORazepam   Injectable 1 milliGRAM(s) IntraMuscular once PRN Agitation  ondansetron   Disintegrating Tablet 4 milliGRAM(s) Oral every 8 hours PRN Nausea and/or Vomiting  polyethylene glycol 3350 17 Gram(s) Oral daily PRN Constipation  senna 2 Tablet(s) Oral at bedtime PRN constipation      Vital Signs Last 24 Hrs  T(C): 36.9 (2020 08:13), Max: 37.8 (15 Jul 2020 19:35)  T(F): 98.4 (2020 08:13), Max: 100 (15 Jul 2020 19:35)  HR: --  BP: --  BP(mean): --  RR: --  SpO2: --  CAPILLARY BLOOD GLUCOSE            PHYSICAL EXAM:  GENERAL: NAD, well-developed  HEAD:  Atraumatic, Normocephalic  EYES: EOMI, conjunctiva and sclera clear  NECK: Supple, No JVD  CHEST/LUNG: Clear to auscultation bilaterally; No wheeze  HEART: Regular rate and rhythm; No murmurs, rubs, or gallops  ABDOMEN: Soft, Nontender, Nondistended; Bowel sounds present  EXTREMITIES:  2+ Peripheral Pulses, No clubbing, cyanosis, or edema  NEUROLOGY: non-focal  SKIN: No rashes or lesions    LABS:                        11.8   11.91 )-----------( 236      ( 2020 14:16 )             36.5     07-14    135  |  99  |  12  ----------------------------<  89  3.9   |  30  |  0.68    Ca    8.8      2020 14:16    TPro  6.7  /  Alb  2.9<L>  /  TBili  0.4  /  DBili  x   /  AST  9<L>  /  ALT  15  /  AlkPhos  78  07-14          Urinalysis Basic - ( 15 Jul 2020 09:46 )    Color: Yellow / Appearance: Slightly Turbid / S.015 / pH: x  Gluc: x / Ketone: Negative  / Bili: Negative / Urobili: Negative mg/dL   Blood: x / Protein: Negative mg/dL / Nitrite: Negative   Leuk Esterase: Trace / RBC: Negative /HPF / WBC 0-2   Sq Epi: x / Non Sq Epi: x / Bacteria: x        EKG(personally reviewed):    RADIOLOGY & ADDITIONAL TESTS:    Imaging Personally Reviewed:    Consultant(s) Notes Reviewed:      Care Discussed with Consultants/Other Providers: Primary team HPI: 79F w/Bipolar disorder, anxiety/depression, HTN and hypothyroidism admitted to [facility] from -7/15, transferred to [facility] for ECT.  At [place], UCx on admission was +Aerococcus, UA negative. Pt was seen by medicine team, but no treatment started. Pt had low grade temp of 100.6 on , CBC w/WBC 11, UA was repeated and mildly positive with +LE, +bacteria, +epithelial cells. Pt started on Ceftin BID, no further fevers reported.     Pt seen in [facility], tearful she is in the hospital, feels like a burden to her family.   Reports urinary frequency, but no dysuria, foul odor, hematuria.     PAST MEDICAL & SURGICAL HISTORY:  Suicidal ideation  Hypertension  No significant past surgical history      Review of Systems:   CONSTITUTIONAL: No fever, weight loss, or fatigue  EYES: No eye pain, visual disturbances, or discharge  ENMT:  No difficulty hearing, tinnitus, vertigo; No sinus or throat pain  NECK: No pain or stiffness  RESPIRATORY: No cough, wheezing, chills or hemoptysis; No shortness of breath  CARDIOVASCULAR: No chest pain, palpitations, dizziness, or leg swelling  GASTROINTESTINAL: No abdominal or epigastric pain. No nausea, vomiting, or hematemesis; No diarrhea or constipation. No melena or hematochezia.  GENITOURINARY: +frequency, No dysuria, hematuria, or incontinence  NEUROLOGICAL: No headaches, memory loss, loss of strength, numbness, or tremors  SKIN: No itching, burning, rashes, or lesions   LYMPH NODES: No enlarged glands  ENDOCRINE: No heat or cold intolerance; No hair loss  MUSCULOSKELETAL: No joint pain or swelling; No muscle, back, or extremity pain  HEME/LYMPH: No easy bruising, or bleeding gums  ALLERY AND IMMUNOLOGIC: No hives or eczema    Allergies    No Known Allergies    Intolerances        Social History:     FAMILY HISTORY:  No pertinent family history in first degree relatives      MEDICATIONS  (STANDING):  amLODIPine   Tablet 10 milliGRAM(s) Oral daily  cefuroxime   Tablet 250 milliGRAM(s) Oral every 12 hours  clonazePAM Oral Disintegrating Tablet 0.25 milliGRAM(s) Oral at bedtime  escitalopram 10 milliGRAM(s) Oral daily  levothyroxine 75 MICROGram(s) Oral daily  losartan 100 milliGRAM(s) Oral daily  pregabalin 25 milliGRAM(s) Oral two times a day  QUEtiapine 100 milliGRAM(s) Oral at bedtime    MEDICATIONS  (PRN):  acetaminophen   Tablet .. 325 milliGRAM(s) Oral every 6 hours PRN Moderate Pain (4 - 6)  haloperidol     Tablet 2 milliGRAM(s) Oral every 4 hours PRN Agitation  haloperidol    Injectable 2 milliGRAM(s) IntraMuscular once PRN Agitation  LORazepam     Tablet 1 milliGRAM(s) Oral every 8 hours PRN Anxiety  LORazepam   Injectable 1 milliGRAM(s) IntraMuscular once PRN Agitation  ondansetron   Disintegrating Tablet 4 milliGRAM(s) Oral every 8 hours PRN Nausea and/or Vomiting  polyethylene glycol 3350 17 Gram(s) Oral daily PRN Constipation  senna 2 Tablet(s) Oral at bedtime PRN constipation      Vital Signs Last 24 Hrs  T(C): 36.9 (2020 08:13), Max: 37.8 (15 Jul 2020 19:35)  T(F): 98.4 (2020 08:13), Max: 100 (15 Jul 2020 19:35)  HR: --  BP: --  BP(mean): --  RR: --  SpO2: --  CAPILLARY BLOOD GLUCOSE            PHYSICAL EXAM:  GENERAL: tearful, elderly, well-developed  HEAD:  Atraumatic, Normocephalic  EYES: EOMI, conjunctiva and sclera clear  NECK: Supple, No JVD  CHEST/LUNG: Clear to auscultation bilaterally; No wheeze  HEART: Regular rate and rhythm; No murmurs, rubs, or gallops  ABDOMEN: Soft, Nontender, Nondistended; Bowel sounds present  EXTREMITIES: wwp   NEUROLOGY: non-focal  SKIN: No rashes or lesions    LABS:                        11.8   11.91 )-----------( 236      ( 2020 14:16 )             36.5     07-14    135  |  99  |  12  ----------------------------<  89  3.9   |  30  |  0.68    Ca    8.8      2020 14:16    TPro  6.7  /  Alb  2.9<L>  /  TBili  0.4  /  DBili  x   /  AST  9<L>  /  ALT  15  /  AlkPhos  78  07-14          Urinalysis Basic - ( 15 Jul 2020 09:46 )    Color: Yellow / Appearance: Slightly Turbid / S.015 / pH: x  Gluc: x / Ketone: Negative  / Bili: Negative / Urobili: Negative mg/dL   Blood: x / Protein: Negative mg/dL / Nitrite: Negative   Leuk Esterase: Trace / RBC: Negative /HPF / WBC 0-2   Sq Epi: x / Non Sq Epi: x / Bacteria: x        EKG(personally reviewed):    RADIOLOGY & ADDITIONAL TESTS:    Imaging Personally Reviewed:    Consultant(s) Notes Reviewed:      Care Discussed with Consultants/Other Providers: Primary team

## 2020-07-16 NOTE — CONSULT NOTE ADULT - ASSESSMENT
79F w/Bipolar disorder, anxiety/depression, HTN and hypothyroidism admitted to [facility] from 7/2-7/15, started on abx for UTI and transferred to [facility] for ECT.  Medicine consulted for ECT clearance.     #UTI: UCx +Aerococcus on 7/2, unclear if pathogenic as pt was afebrile until 7/14   -C/w Ceftin x5 days  -If pt reports continued symptoms or has fevers, will repeat UA + UCx.     #HTN: well controlled  -C/w Amlodipine and Losartan    #Hypothyroidism  -C/w Synthroid 75mcg oral daily    #ECT clearance:   Patient does not have unstable or severe cardiovascular disease  There is no evidence of increased intracranial pressure, also no hx of strokes or cerebral hemorrhage  Patient does not complain of dyspnea on exertion  ASA class 1    There is no contraindication to proceed with ECT from a medical standpoint

## 2020-07-17 PROCEDURE — 99232 SBSQ HOSP IP/OBS MODERATE 35: CPT

## 2020-07-17 RX ADMIN — LOSARTAN POTASSIUM 100 MILLIGRAM(S): 100 TABLET, FILM COATED ORAL at 08:43

## 2020-07-17 RX ADMIN — Medication 0.5 MILLIGRAM(S): at 13:57

## 2020-07-17 RX ADMIN — Medication 250 MILLIGRAM(S): at 20:45

## 2020-07-17 RX ADMIN — Medication 250 MILLIGRAM(S): at 08:43

## 2020-07-17 RX ADMIN — QUETIAPINE FUMARATE 100 MILLIGRAM(S): 200 TABLET, FILM COATED ORAL at 20:45

## 2020-07-17 RX ADMIN — Medication 75 MICROGRAM(S): at 08:43

## 2020-07-17 RX ADMIN — Medication 25 MILLIGRAM(S): at 08:43

## 2020-07-17 RX ADMIN — ESCITALOPRAM OXALATE 10 MILLIGRAM(S): 10 TABLET, FILM COATED ORAL at 08:43

## 2020-07-17 RX ADMIN — Medication 25 MILLIGRAM(S): at 20:45

## 2020-07-17 RX ADMIN — Medication 0.25 MILLIGRAM(S): at 20:45

## 2020-07-17 RX ADMIN — AMLODIPINE BESYLATE 10 MILLIGRAM(S): 2.5 TABLET ORAL at 08:43

## 2020-07-18 LAB — SARS-COV-2 RNA SPEC QL NAA+PROBE: SIGNIFICANT CHANGE UP

## 2020-07-18 PROCEDURE — 99232 SBSQ HOSP IP/OBS MODERATE 35: CPT

## 2020-07-18 RX ADMIN — AMLODIPINE BESYLATE 10 MILLIGRAM(S): 2.5 TABLET ORAL at 08:37

## 2020-07-18 RX ADMIN — Medication 0.5 MILLIGRAM(S): at 10:32

## 2020-07-18 RX ADMIN — Medication 25 MILLIGRAM(S): at 20:37

## 2020-07-18 RX ADMIN — Medication 250 MILLIGRAM(S): at 08:37

## 2020-07-18 RX ADMIN — Medication 25 MILLIGRAM(S): at 08:37

## 2020-07-18 RX ADMIN — Medication 250 MILLIGRAM(S): at 20:37

## 2020-07-18 RX ADMIN — Medication 75 MICROGRAM(S): at 08:37

## 2020-07-18 RX ADMIN — LOSARTAN POTASSIUM 100 MILLIGRAM(S): 100 TABLET, FILM COATED ORAL at 08:37

## 2020-07-18 RX ADMIN — Medication 1 TABLET(S): at 08:37

## 2020-07-18 RX ADMIN — ESCITALOPRAM OXALATE 10 MILLIGRAM(S): 10 TABLET, FILM COATED ORAL at 08:37

## 2020-07-18 RX ADMIN — Medication 0.25 MILLIGRAM(S): at 20:37

## 2020-07-18 RX ADMIN — QUETIAPINE FUMARATE 100 MILLIGRAM(S): 200 TABLET, FILM COATED ORAL at 20:37

## 2020-07-19 DIAGNOSIS — I10 ESSENTIAL (PRIMARY) HYPERTENSION: ICD-10-CM

## 2020-07-19 DIAGNOSIS — F43.10 POST-TRAUMATIC STRESS DISORDER, UNSPECIFIED: ICD-10-CM

## 2020-07-19 DIAGNOSIS — E03.9 HYPOTHYROIDISM, UNSPECIFIED: ICD-10-CM

## 2020-07-19 DIAGNOSIS — R45.850 HOMICIDAL IDEATIONS: ICD-10-CM

## 2020-07-19 DIAGNOSIS — R45.851 SUICIDAL IDEATIONS: ICD-10-CM

## 2020-07-19 DIAGNOSIS — F41.1 GENERALIZED ANXIETY DISORDER: ICD-10-CM

## 2020-07-19 DIAGNOSIS — E78.5 HYPERLIPIDEMIA, UNSPECIFIED: ICD-10-CM

## 2020-07-19 DIAGNOSIS — R50.9 FEVER, UNSPECIFIED: ICD-10-CM

## 2020-07-19 DIAGNOSIS — F31.4 BIPOLAR DISORDER, CURRENT EPISODE DEPRESSED, SEVERE, WITHOUT PSYCHOTIC FEATURES: ICD-10-CM

## 2020-07-19 LAB
CULTURE RESULTS: SIGNIFICANT CHANGE UP
SPECIMEN SOURCE: SIGNIFICANT CHANGE UP

## 2020-07-19 PROCEDURE — 99231 SBSQ HOSP IP/OBS SF/LOW 25: CPT

## 2020-07-19 RX ADMIN — AMLODIPINE BESYLATE 10 MILLIGRAM(S): 2.5 TABLET ORAL at 08:56

## 2020-07-19 RX ADMIN — Medication 25 MILLIGRAM(S): at 08:57

## 2020-07-19 RX ADMIN — Medication 0.25 MILLIGRAM(S): at 20:16

## 2020-07-19 RX ADMIN — Medication 0.5 MILLIGRAM(S): at 12:15

## 2020-07-19 RX ADMIN — Medication 250 MILLIGRAM(S): at 20:16

## 2020-07-19 RX ADMIN — Medication 325 MILLIGRAM(S): at 17:22

## 2020-07-19 RX ADMIN — QUETIAPINE FUMARATE 100 MILLIGRAM(S): 200 TABLET, FILM COATED ORAL at 20:16

## 2020-07-19 RX ADMIN — Medication 250 MILLIGRAM(S): at 08:56

## 2020-07-19 RX ADMIN — Medication 25 MILLIGRAM(S): at 20:16

## 2020-07-19 RX ADMIN — Medication 75 MICROGRAM(S): at 07:21

## 2020-07-19 RX ADMIN — LOSARTAN POTASSIUM 100 MILLIGRAM(S): 100 TABLET, FILM COATED ORAL at 08:56

## 2020-07-19 RX ADMIN — Medication 1 TABLET(S): at 08:56

## 2020-07-19 RX ADMIN — ESCITALOPRAM OXALATE 10 MILLIGRAM(S): 10 TABLET, FILM COATED ORAL at 08:56

## 2020-07-19 RX ADMIN — Medication 325 MILLIGRAM(S): at 15:39

## 2020-07-20 PROCEDURE — 90870 ELECTROCONVULSIVE THERAPY: CPT

## 2020-07-20 PROCEDURE — 93010 ELECTROCARDIOGRAM REPORT: CPT

## 2020-07-20 RX ORDER — CEFUROXIME AXETIL 250 MG
250 TABLET ORAL ONCE
Refills: 0 | Status: COMPLETED | OUTPATIENT
Start: 2020-07-20 | End: 2020-07-20

## 2020-07-20 RX ORDER — MIDAZOLAM HYDROCHLORIDE 1 MG/ML
2 INJECTION, SOLUTION INTRAMUSCULAR; INTRAVENOUS ONCE
Refills: 0 | Status: DISCONTINUED | OUTPATIENT
Start: 2020-07-20 | End: 2020-07-20

## 2020-07-20 RX ORDER — FLUMAZENIL 0.1 MG/ML
0.2 VIAL (ML) INTRAVENOUS ONCE
Refills: 0 | Status: COMPLETED | OUTPATIENT
Start: 2020-07-20 | End: 2020-07-20

## 2020-07-20 RX ADMIN — Medication 25 MILLIGRAM(S): at 20:23

## 2020-07-20 RX ADMIN — Medication 1 TABLET(S): at 14:50

## 2020-07-20 RX ADMIN — Medication 0.2 MILLIGRAM(S): at 13:23

## 2020-07-20 RX ADMIN — Medication 250 MILLIGRAM(S): at 14:51

## 2020-07-20 RX ADMIN — QUETIAPINE FUMARATE 100 MILLIGRAM(S): 200 TABLET, FILM COATED ORAL at 20:23

## 2020-07-20 RX ADMIN — Medication 0.25 MILLIGRAM(S): at 20:24

## 2020-07-20 RX ADMIN — ESCITALOPRAM OXALATE 10 MILLIGRAM(S): 10 TABLET, FILM COATED ORAL at 14:50

## 2020-07-20 RX ADMIN — ONDANSETRON 4 MILLIGRAM(S): 8 TABLET, FILM COATED ORAL at 18:02

## 2020-07-21 LAB
APPEARANCE UR: CLEAR — SIGNIFICANT CHANGE UP
BILIRUB UR-MCNC: NEGATIVE — SIGNIFICANT CHANGE UP
BLOOD UR QL VISUAL: NEGATIVE — SIGNIFICANT CHANGE UP
COLOR SPEC: YELLOW — SIGNIFICANT CHANGE UP
GLUCOSE UR-MCNC: NEGATIVE — SIGNIFICANT CHANGE UP
KETONES UR-MCNC: NEGATIVE — SIGNIFICANT CHANGE UP
LEUKOCYTE ESTERASE UR-ACNC: NEGATIVE — SIGNIFICANT CHANGE UP
NITRITE UR-MCNC: NEGATIVE — SIGNIFICANT CHANGE UP
PH UR: 6 — SIGNIFICANT CHANGE UP (ref 5–8)
PROT UR-MCNC: NEGATIVE — SIGNIFICANT CHANGE UP
SP GR SPEC: 1.02 — SIGNIFICANT CHANGE UP (ref 1–1.04)
UROBILINOGEN FLD QL: NORMAL — SIGNIFICANT CHANGE UP

## 2020-07-21 PROCEDURE — 99232 SBSQ HOSP IP/OBS MODERATE 35: CPT

## 2020-07-21 RX ADMIN — Medication 0.5 MILLIGRAM(S): at 08:40

## 2020-07-21 RX ADMIN — Medication 25 MILLIGRAM(S): at 21:06

## 2020-07-21 RX ADMIN — Medication 75 MICROGRAM(S): at 07:21

## 2020-07-21 RX ADMIN — Medication 1 TABLET(S): at 08:40

## 2020-07-21 RX ADMIN — Medication 0.25 MILLIGRAM(S): at 21:06

## 2020-07-21 RX ADMIN — LOSARTAN POTASSIUM 100 MILLIGRAM(S): 100 TABLET, FILM COATED ORAL at 08:40

## 2020-07-21 RX ADMIN — QUETIAPINE FUMARATE 100 MILLIGRAM(S): 200 TABLET, FILM COATED ORAL at 21:06

## 2020-07-21 RX ADMIN — ESCITALOPRAM OXALATE 10 MILLIGRAM(S): 10 TABLET, FILM COATED ORAL at 08:40

## 2020-07-21 RX ADMIN — AMLODIPINE BESYLATE 10 MILLIGRAM(S): 2.5 TABLET ORAL at 08:40

## 2020-07-21 RX ADMIN — Medication 25 MILLIGRAM(S): at 08:40

## 2020-07-22 PROCEDURE — 90870 ELECTROCONVULSIVE THERAPY: CPT

## 2020-07-22 RX ORDER — FLUMAZENIL 0.1 MG/ML
0.2 VIAL (ML) INTRAVENOUS ONCE
Refills: 0 | Status: COMPLETED | OUTPATIENT
Start: 2020-07-22 | End: 2020-07-22

## 2020-07-22 RX ORDER — CLONAZEPAM 1 MG
0.25 TABLET ORAL AT BEDTIME
Refills: 0 | Status: DISCONTINUED | OUTPATIENT
Start: 2020-07-22 | End: 2020-07-29

## 2020-07-22 RX ADMIN — AMLODIPINE BESYLATE 10 MILLIGRAM(S): 2.5 TABLET ORAL at 14:12

## 2020-07-22 RX ADMIN — ESCITALOPRAM OXALATE 10 MILLIGRAM(S): 10 TABLET, FILM COATED ORAL at 14:12

## 2020-07-22 RX ADMIN — Medication 25 MILLIGRAM(S): at 20:57

## 2020-07-22 RX ADMIN — Medication 0.5 MILLIGRAM(S): at 06:15

## 2020-07-22 RX ADMIN — Medication 0.2 MILLIGRAM(S): at 11:38

## 2020-07-22 RX ADMIN — Medication 1 TABLET(S): at 14:13

## 2020-07-22 RX ADMIN — Medication 0.5 MILLIGRAM(S): at 10:53

## 2020-07-22 RX ADMIN — QUETIAPINE FUMARATE 100 MILLIGRAM(S): 200 TABLET, FILM COATED ORAL at 20:57

## 2020-07-22 RX ADMIN — Medication 1 MILLIGRAM(S): at 14:10

## 2020-07-22 RX ADMIN — Medication 0.25 MILLIGRAM(S): at 20:57

## 2020-07-22 RX ADMIN — LOSARTAN POTASSIUM 100 MILLIGRAM(S): 100 TABLET, FILM COATED ORAL at 14:12

## 2020-07-23 PROCEDURE — 99232 SBSQ HOSP IP/OBS MODERATE 35: CPT

## 2020-07-23 RX ADMIN — LOSARTAN POTASSIUM 100 MILLIGRAM(S): 100 TABLET, FILM COATED ORAL at 09:02

## 2020-07-23 RX ADMIN — Medication 1 TABLET(S): at 09:02

## 2020-07-23 RX ADMIN — Medication 0.25 MILLIGRAM(S): at 20:00

## 2020-07-23 RX ADMIN — Medication 25 MILLIGRAM(S): at 20:00

## 2020-07-23 RX ADMIN — Medication 25 MILLIGRAM(S): at 09:02

## 2020-07-23 RX ADMIN — Medication 75 MICROGRAM(S): at 09:02

## 2020-07-23 RX ADMIN — AMLODIPINE BESYLATE 10 MILLIGRAM(S): 2.5 TABLET ORAL at 09:03

## 2020-07-23 RX ADMIN — QUETIAPINE FUMARATE 100 MILLIGRAM(S): 200 TABLET, FILM COATED ORAL at 20:00

## 2020-07-23 RX ADMIN — ESCITALOPRAM OXALATE 10 MILLIGRAM(S): 10 TABLET, FILM COATED ORAL at 09:02

## 2020-07-23 RX ADMIN — Medication 1 MILLIGRAM(S): at 09:54

## 2020-07-24 PROCEDURE — 99232 SBSQ HOSP IP/OBS MODERATE 35: CPT | Mod: 25

## 2020-07-24 PROCEDURE — 90870 ELECTROCONVULSIVE THERAPY: CPT

## 2020-07-24 RX ORDER — FLUMAZENIL 0.1 MG/ML
0.2 VIAL (ML) INTRAVENOUS ONCE
Refills: 0 | Status: COMPLETED | OUTPATIENT
Start: 2020-07-24 | End: 2020-07-24

## 2020-07-24 RX ADMIN — Medication 25 MILLIGRAM(S): at 20:30

## 2020-07-24 RX ADMIN — HALOPERIDOL DECANOATE 2 MILLIGRAM(S): 100 INJECTION INTRAMUSCULAR at 17:19

## 2020-07-24 RX ADMIN — Medication 0.25 MILLIGRAM(S): at 20:30

## 2020-07-24 RX ADMIN — Medication 325 MILLIGRAM(S): at 18:07

## 2020-07-24 RX ADMIN — Medication 1 MILLIGRAM(S): at 06:12

## 2020-07-24 RX ADMIN — QUETIAPINE FUMARATE 100 MILLIGRAM(S): 200 TABLET, FILM COATED ORAL at 20:30

## 2020-07-24 RX ADMIN — Medication 325 MILLIGRAM(S): at 17:18

## 2020-07-24 RX ADMIN — Medication 0.2 MILLIGRAM(S): at 12:47

## 2020-07-25 LAB — SARS-COV-2 RNA SPEC QL NAA+PROBE: SIGNIFICANT CHANGE UP

## 2020-07-25 RX ADMIN — Medication 1 TABLET(S): at 08:54

## 2020-07-25 RX ADMIN — Medication 75 MICROGRAM(S): at 08:54

## 2020-07-25 RX ADMIN — Medication 0.25 MILLIGRAM(S): at 20:45

## 2020-07-25 RX ADMIN — ESCITALOPRAM OXALATE 10 MILLIGRAM(S): 10 TABLET, FILM COATED ORAL at 08:54

## 2020-07-25 RX ADMIN — QUETIAPINE FUMARATE 100 MILLIGRAM(S): 200 TABLET, FILM COATED ORAL at 20:46

## 2020-07-25 RX ADMIN — Medication 1 MILLIGRAM(S): at 08:54

## 2020-07-25 RX ADMIN — Medication 25 MILLIGRAM(S): at 08:54

## 2020-07-25 RX ADMIN — Medication 25 MILLIGRAM(S): at 20:46

## 2020-07-26 RX ADMIN — LOSARTAN POTASSIUM 100 MILLIGRAM(S): 100 TABLET, FILM COATED ORAL at 09:23

## 2020-07-26 RX ADMIN — Medication 0.25 MILLIGRAM(S): at 20:17

## 2020-07-26 RX ADMIN — AMLODIPINE BESYLATE 10 MILLIGRAM(S): 2.5 TABLET ORAL at 09:23

## 2020-07-26 RX ADMIN — Medication 25 MILLIGRAM(S): at 09:23

## 2020-07-26 RX ADMIN — Medication 25 MILLIGRAM(S): at 20:18

## 2020-07-26 RX ADMIN — ESCITALOPRAM OXALATE 10 MILLIGRAM(S): 10 TABLET, FILM COATED ORAL at 09:22

## 2020-07-26 RX ADMIN — Medication 75 MICROGRAM(S): at 09:22

## 2020-07-26 RX ADMIN — Medication 1 MILLIGRAM(S): at 09:22

## 2020-07-26 RX ADMIN — QUETIAPINE FUMARATE 100 MILLIGRAM(S): 200 TABLET, FILM COATED ORAL at 20:18

## 2020-07-26 RX ADMIN — Medication 1 TABLET(S): at 09:23

## 2020-07-27 PROCEDURE — 99232 SBSQ HOSP IP/OBS MODERATE 35: CPT | Mod: 25

## 2020-07-27 PROCEDURE — 90870 ELECTROCONVULSIVE THERAPY: CPT

## 2020-07-27 RX ORDER — FLUMAZENIL 0.1 MG/ML
0.2 VIAL (ML) INTRAVENOUS ONCE
Refills: 0 | Status: COMPLETED | OUTPATIENT
Start: 2020-07-27 | End: 2020-07-27

## 2020-07-27 RX ORDER — ACETAMINOPHEN 500 MG
325 TABLET ORAL ONCE
Refills: 0 | Status: COMPLETED | OUTPATIENT
Start: 2020-07-27 | End: 2020-07-27

## 2020-07-27 RX ADMIN — Medication 0.25 MILLIGRAM(S): at 20:46

## 2020-07-27 RX ADMIN — Medication 25 MILLIGRAM(S): at 20:46

## 2020-07-27 RX ADMIN — QUETIAPINE FUMARATE 100 MILLIGRAM(S): 200 TABLET, FILM COATED ORAL at 20:46

## 2020-07-27 RX ADMIN — Medication 325 MILLIGRAM(S): at 19:59

## 2020-07-27 RX ADMIN — Medication 0.2 MILLIGRAM(S): at 11:57

## 2020-07-27 RX ADMIN — Medication 325 MILLIGRAM(S): at 21:00

## 2020-07-27 RX ADMIN — ESCITALOPRAM OXALATE 10 MILLIGRAM(S): 10 TABLET, FILM COATED ORAL at 14:19

## 2020-07-27 RX ADMIN — AMLODIPINE BESYLATE 10 MILLIGRAM(S): 2.5 TABLET ORAL at 14:19

## 2020-07-27 RX ADMIN — LOSARTAN POTASSIUM 100 MILLIGRAM(S): 100 TABLET, FILM COATED ORAL at 14:19

## 2020-07-27 RX ADMIN — Medication 1 MILLIGRAM(S): at 06:13

## 2020-07-28 PROCEDURE — 99232 SBSQ HOSP IP/OBS MODERATE 35: CPT

## 2020-07-28 RX ADMIN — Medication 25 MILLIGRAM(S): at 09:26

## 2020-07-28 RX ADMIN — QUETIAPINE FUMARATE 100 MILLIGRAM(S): 200 TABLET, FILM COATED ORAL at 21:01

## 2020-07-28 RX ADMIN — Medication 1 MILLIGRAM(S): at 09:26

## 2020-07-28 RX ADMIN — Medication 1 TABLET(S): at 09:26

## 2020-07-28 RX ADMIN — ESCITALOPRAM OXALATE 10 MILLIGRAM(S): 10 TABLET, FILM COATED ORAL at 09:26

## 2020-07-28 RX ADMIN — Medication 0.25 MILLIGRAM(S): at 21:01

## 2020-07-28 RX ADMIN — AMLODIPINE BESYLATE 10 MILLIGRAM(S): 2.5 TABLET ORAL at 09:26

## 2020-07-28 RX ADMIN — LOSARTAN POTASSIUM 100 MILLIGRAM(S): 100 TABLET, FILM COATED ORAL at 09:26

## 2020-07-28 RX ADMIN — Medication 25 MILLIGRAM(S): at 21:01

## 2020-07-28 RX ADMIN — Medication 75 MICROGRAM(S): at 09:26

## 2020-07-29 PROCEDURE — 90870 ELECTROCONVULSIVE THERAPY: CPT

## 2020-07-29 RX ORDER — FLUMAZENIL 0.1 MG/ML
0.2 VIAL (ML) INTRAVENOUS ONCE
Refills: 0 | Status: COMPLETED | OUTPATIENT
Start: 2020-07-29 | End: 2020-07-29

## 2020-07-29 RX ADMIN — Medication 25 MILLIGRAM(S): at 20:51

## 2020-07-29 RX ADMIN — Medication 0.25 MILLIGRAM(S): at 20:51

## 2020-07-29 RX ADMIN — Medication 0.2 MILLIGRAM(S): at 11:20

## 2020-07-29 RX ADMIN — Medication 1 MILLIGRAM(S): at 06:05

## 2020-07-29 RX ADMIN — QUETIAPINE FUMARATE 100 MILLIGRAM(S): 200 TABLET, FILM COATED ORAL at 21:00

## 2020-07-30 PROCEDURE — 99232 SBSQ HOSP IP/OBS MODERATE 35: CPT

## 2020-07-30 RX ORDER — CLONAZEPAM 1 MG
0.25 TABLET ORAL AT BEDTIME
Refills: 0 | Status: DISCONTINUED | OUTPATIENT
Start: 2020-07-30 | End: 2020-07-31

## 2020-07-30 RX ADMIN — Medication 1 MILLIGRAM(S): at 20:53

## 2020-07-30 RX ADMIN — LOSARTAN POTASSIUM 100 MILLIGRAM(S): 100 TABLET, FILM COATED ORAL at 08:43

## 2020-07-30 RX ADMIN — QUETIAPINE FUMARATE 100 MILLIGRAM(S): 200 TABLET, FILM COATED ORAL at 20:53

## 2020-07-30 RX ADMIN — Medication 0.25 MILLIGRAM(S): at 20:53

## 2020-07-30 RX ADMIN — Medication 75 MICROGRAM(S): at 07:28

## 2020-07-30 RX ADMIN — AMLODIPINE BESYLATE 10 MILLIGRAM(S): 2.5 TABLET ORAL at 08:43

## 2020-07-30 RX ADMIN — Medication 25 MILLIGRAM(S): at 20:53

## 2020-07-30 RX ADMIN — Medication 1 TABLET(S): at 08:43

## 2020-07-30 RX ADMIN — ESCITALOPRAM OXALATE 10 MILLIGRAM(S): 10 TABLET, FILM COATED ORAL at 08:43

## 2020-07-30 RX ADMIN — Medication 1 MILLIGRAM(S): at 08:43

## 2020-07-30 NOTE — PHARMACOTHERAPY INTERVENTION NOTE - COMMENTS
Called Dr. Trotter to recommend renewing Klonopin wafer 0.25 mG at bedtime & Lyrica 25 mG BID.  Md will evaluate need to continue.

## 2020-07-31 ENCOUNTER — INPATIENT (INPATIENT)
Facility: HOSPITAL | Age: 80
LOS: 5 days | Discharge: PSYCHIATRIC OR SUBSTANCE USE FACILITY | End: 2020-08-06
Attending: HOSPITALIST | Admitting: HOSPITALIST
Payer: MEDICARE

## 2020-07-31 VITALS
DIASTOLIC BLOOD PRESSURE: 69 MMHG | RESPIRATION RATE: 20 BRPM | SYSTOLIC BLOOD PRESSURE: 121 MMHG | OXYGEN SATURATION: 96 % | TEMPERATURE: 101 F | HEART RATE: 71 BPM

## 2020-07-31 VITALS — TEMPERATURE: 101 F

## 2020-07-31 DIAGNOSIS — Z29.9 ENCOUNTER FOR PROPHYLACTIC MEASURES, UNSPECIFIED: ICD-10-CM

## 2020-07-31 DIAGNOSIS — E03.9 HYPOTHYROIDISM, UNSPECIFIED: ICD-10-CM

## 2020-07-31 DIAGNOSIS — Z90.5 ACQUIRED ABSENCE OF KIDNEY: Chronic | ICD-10-CM

## 2020-07-31 DIAGNOSIS — R55 SYNCOPE AND COLLAPSE: ICD-10-CM

## 2020-07-31 DIAGNOSIS — F99 MENTAL DISORDER, NOT OTHERWISE SPECIFIED: ICD-10-CM

## 2020-07-31 DIAGNOSIS — Z98.891 HISTORY OF UTERINE SCAR FROM PREVIOUS SURGERY: Chronic | ICD-10-CM

## 2020-07-31 DIAGNOSIS — R10.84 GENERALIZED ABDOMINAL PAIN: ICD-10-CM

## 2020-07-31 DIAGNOSIS — I10 ESSENTIAL (PRIMARY) HYPERTENSION: ICD-10-CM

## 2020-07-31 LAB
ALBUMIN SERPL ELPH-MCNC: 3.8 G/DL — SIGNIFICANT CHANGE UP (ref 3.3–5)
ALP SERPL-CCNC: 74 U/L — SIGNIFICANT CHANGE UP (ref 40–120)
ALT FLD-CCNC: 14 U/L — SIGNIFICANT CHANGE UP (ref 4–33)
ANION GAP SERPL CALC-SCNC: 12 MMO/L — SIGNIFICANT CHANGE UP (ref 7–14)
ANION GAP SERPL CALC-SCNC: 13 MMO/L — SIGNIFICANT CHANGE UP (ref 7–14)
APPEARANCE UR: CLEAR — SIGNIFICANT CHANGE UP
AST SERPL-CCNC: 17 U/L — SIGNIFICANT CHANGE UP (ref 4–32)
BASE EXCESS BLDV CALC-SCNC: 2.2 MMOL/L — SIGNIFICANT CHANGE UP
BASOPHILS # BLD AUTO: 0.03 K/UL — SIGNIFICANT CHANGE UP (ref 0–0.2)
BASOPHILS NFR BLD AUTO: 0.2 % — SIGNIFICANT CHANGE UP (ref 0–2)
BILIRUB SERPL-MCNC: 0.3 MG/DL — SIGNIFICANT CHANGE UP (ref 0.2–1.2)
BILIRUB UR-MCNC: NEGATIVE — SIGNIFICANT CHANGE UP
BLOOD GAS VENOUS - CREATININE: 0.65 MG/DL — SIGNIFICANT CHANGE UP (ref 0.5–1.3)
BLOOD GAS VENOUS - FIO2: 21 — SIGNIFICANT CHANGE UP
BLOOD UR QL VISUAL: NEGATIVE — SIGNIFICANT CHANGE UP
BUN SERPL-MCNC: 16 MG/DL — SIGNIFICANT CHANGE UP (ref 7–23)
BUN SERPL-MCNC: 19 MG/DL — SIGNIFICANT CHANGE UP (ref 7–23)
CALCIUM SERPL-MCNC: 9.1 MG/DL — SIGNIFICANT CHANGE UP (ref 8.4–10.5)
CALCIUM SERPL-MCNC: 9.1 MG/DL — SIGNIFICANT CHANGE UP (ref 8.4–10.5)
CHLORIDE BLDV-SCNC: 104 MMOL/L — SIGNIFICANT CHANGE UP (ref 96–108)
CHLORIDE SERPL-SCNC: 97 MMOL/L — LOW (ref 98–107)
CHLORIDE SERPL-SCNC: 98 MMOL/L — SIGNIFICANT CHANGE UP (ref 98–107)
CO2 SERPL-SCNC: 23 MMOL/L — SIGNIFICANT CHANGE UP (ref 22–31)
CO2 SERPL-SCNC: 25 MMOL/L — SIGNIFICANT CHANGE UP (ref 22–31)
COLOR SPEC: YELLOW — SIGNIFICANT CHANGE UP
CREAT SERPL-MCNC: 0.58 MG/DL — SIGNIFICANT CHANGE UP (ref 0.5–1.3)
CREAT SERPL-MCNC: 0.62 MG/DL — SIGNIFICANT CHANGE UP (ref 0.5–1.3)
EOSINOPHIL # BLD AUTO: 0 K/UL — SIGNIFICANT CHANGE UP (ref 0–0.5)
EOSINOPHIL NFR BLD AUTO: 0 % — SIGNIFICANT CHANGE UP (ref 0–6)
GAS PNL BLDV: 132 MMOL/L — LOW (ref 136–146)
GLUCOSE BLDV-MCNC: 111 MG/DL — HIGH (ref 70–99)
GLUCOSE SERPL-MCNC: 122 MG/DL — HIGH (ref 70–99)
GLUCOSE SERPL-MCNC: 126 MG/DL — HIGH (ref 70–99)
GLUCOSE UR-MCNC: NEGATIVE — SIGNIFICANT CHANGE UP
HCO3 BLDV-SCNC: 26 MMOL/L — SIGNIFICANT CHANGE UP (ref 20–27)
HCT VFR BLD CALC: 37.5 % — SIGNIFICANT CHANGE UP (ref 34.5–45)
HCT VFR BLD CALC: 41.6 % — SIGNIFICANT CHANGE UP (ref 34.5–45)
HCT VFR BLDV CALC: 39 % — SIGNIFICANT CHANGE UP (ref 34.5–45)
HGB BLD-MCNC: 12.8 G/DL — SIGNIFICANT CHANGE UP (ref 11.5–15.5)
HGB BLD-MCNC: 13.5 G/DL — SIGNIFICANT CHANGE UP (ref 11.5–15.5)
HGB BLDV-MCNC: 12.7 G/DL — SIGNIFICANT CHANGE UP (ref 11.5–15.5)
IMM GRANULOCYTES NFR BLD AUTO: 0.3 % — SIGNIFICANT CHANGE UP (ref 0–1.5)
KETONES UR-MCNC: NEGATIVE — SIGNIFICANT CHANGE UP
LACTATE BLDV-MCNC: 0.9 MMOL/L — SIGNIFICANT CHANGE UP (ref 0.5–2)
LEUKOCYTE ESTERASE UR-ACNC: NEGATIVE — SIGNIFICANT CHANGE UP
LYMPHOCYTES # BLD AUTO: 0.78 K/UL — LOW (ref 1–3.3)
LYMPHOCYTES # BLD AUTO: 4.4 % — LOW (ref 13–44)
MAGNESIUM SERPL-MCNC: 1.7 MG/DL — SIGNIFICANT CHANGE UP (ref 1.6–2.6)
MCHC RBC-ENTMCNC: 31.4 PG — SIGNIFICANT CHANGE UP (ref 27–34)
MCHC RBC-ENTMCNC: 32.5 % — SIGNIFICANT CHANGE UP (ref 32–36)
MCHC RBC-ENTMCNC: 32.5 PG — SIGNIFICANT CHANGE UP (ref 27–34)
MCHC RBC-ENTMCNC: 34.1 % — SIGNIFICANT CHANGE UP (ref 32–36)
MCV RBC AUTO: 95.2 FL — SIGNIFICANT CHANGE UP (ref 80–100)
MCV RBC AUTO: 96.7 FL — SIGNIFICANT CHANGE UP (ref 80–100)
MONOCYTES # BLD AUTO: 1.18 K/UL — HIGH (ref 0–0.9)
MONOCYTES NFR BLD AUTO: 6.7 % — SIGNIFICANT CHANGE UP (ref 2–14)
NEUTROPHILS # BLD AUTO: 15.63 K/UL — HIGH (ref 1.8–7.4)
NEUTROPHILS NFR BLD AUTO: 88.4 % — HIGH (ref 43–77)
NITRITE UR-MCNC: NEGATIVE — SIGNIFICANT CHANGE UP
NRBC # FLD: 0 K/UL — SIGNIFICANT CHANGE UP (ref 0–0)
NRBC # FLD: 0 K/UL — SIGNIFICANT CHANGE UP (ref 0–0)
PCO2 BLDV: 42 MMHG — SIGNIFICANT CHANGE UP (ref 41–51)
PH BLDV: 7.42 PH — SIGNIFICANT CHANGE UP (ref 7.32–7.43)
PH UR: 6.5 — SIGNIFICANT CHANGE UP (ref 5–8)
PHOSPHATE SERPL-MCNC: 3 MG/DL — SIGNIFICANT CHANGE UP (ref 2.5–4.5)
PLATELET # BLD AUTO: 341 K/UL — SIGNIFICANT CHANGE UP (ref 150–400)
PLATELET # BLD AUTO: 378 K/UL — SIGNIFICANT CHANGE UP (ref 150–400)
PMV BLD: 9.4 FL — SIGNIFICANT CHANGE UP (ref 7–13)
PMV BLD: 9.5 FL — SIGNIFICANT CHANGE UP (ref 7–13)
PO2 BLDV: 41 MMHG — HIGH (ref 35–40)
POTASSIUM BLDV-SCNC: 3.5 MMOL/L — SIGNIFICANT CHANGE UP (ref 3.4–4.5)
POTASSIUM SERPL-MCNC: 3.7 MMOL/L — SIGNIFICANT CHANGE UP (ref 3.5–5.3)
POTASSIUM SERPL-MCNC: 4.3 MMOL/L — SIGNIFICANT CHANGE UP (ref 3.5–5.3)
POTASSIUM SERPL-SCNC: 3.7 MMOL/L — SIGNIFICANT CHANGE UP (ref 3.5–5.3)
POTASSIUM SERPL-SCNC: 4.3 MMOL/L — SIGNIFICANT CHANGE UP (ref 3.5–5.3)
PROT SERPL-MCNC: 6.8 G/DL — SIGNIFICANT CHANGE UP (ref 6–8.3)
PROT UR-MCNC: NEGATIVE — SIGNIFICANT CHANGE UP
RBC # BLD: 3.94 M/UL — SIGNIFICANT CHANGE UP (ref 3.8–5.2)
RBC # BLD: 4.3 M/UL — SIGNIFICANT CHANGE UP (ref 3.8–5.2)
RBC # FLD: 12.1 % — SIGNIFICANT CHANGE UP (ref 10.3–14.5)
RBC # FLD: 12.2 % — SIGNIFICANT CHANGE UP (ref 10.3–14.5)
SAO2 % BLDV: 74.1 % — SIGNIFICANT CHANGE UP (ref 60–85)
SARS-COV-2 RNA SPEC QL NAA+PROBE: SIGNIFICANT CHANGE UP
SODIUM SERPL-SCNC: 133 MMOL/L — LOW (ref 135–145)
SODIUM SERPL-SCNC: 135 MMOL/L — SIGNIFICANT CHANGE UP (ref 135–145)
SP GR SPEC: 1.01 — SIGNIFICANT CHANGE UP (ref 1–1.04)
UROBILINOGEN FLD QL: NORMAL — SIGNIFICANT CHANGE UP
WBC # BLD: 16.54 K/UL — HIGH (ref 3.8–10.5)
WBC # BLD: 17.68 K/UL — HIGH (ref 3.8–10.5)
WBC # FLD AUTO: 16.54 K/UL — HIGH (ref 3.8–10.5)
WBC # FLD AUTO: 17.68 K/UL — HIGH (ref 3.8–10.5)

## 2020-07-31 PROCEDURE — 99285 EMERGENCY DEPT VISIT HI MDM: CPT

## 2020-07-31 PROCEDURE — 99233 SBSQ HOSP IP/OBS HIGH 50: CPT

## 2020-07-31 PROCEDURE — 71045 X-RAY EXAM CHEST 1 VIEW: CPT | Mod: 26

## 2020-07-31 PROCEDURE — 99232 SBSQ HOSP IP/OBS MODERATE 35: CPT

## 2020-07-31 PROCEDURE — 99223 1ST HOSP IP/OBS HIGH 75: CPT | Mod: GC

## 2020-07-31 RX ORDER — ACETAMINOPHEN 500 MG
650 TABLET ORAL ONCE
Refills: 0 | Status: DISCONTINUED | OUTPATIENT
Start: 2020-07-31 | End: 2020-07-31

## 2020-07-31 RX ORDER — SODIUM CHLORIDE 9 MG/ML
1000 INJECTION INTRAMUSCULAR; INTRAVENOUS; SUBCUTANEOUS ONCE
Refills: 0 | Status: COMPLETED | OUTPATIENT
Start: 2020-07-31 | End: 2020-07-31

## 2020-07-31 RX ORDER — QUETIAPINE FUMARATE 200 MG/1
100 TABLET, FILM COATED ORAL AT BEDTIME
Refills: 0 | Status: DISCONTINUED | OUTPATIENT
Start: 2020-07-31 | End: 2020-08-06

## 2020-07-31 RX ORDER — ACETAMINOPHEN 500 MG
650 TABLET ORAL ONCE
Refills: 0 | Status: COMPLETED | OUTPATIENT
Start: 2020-07-31 | End: 2020-07-31

## 2020-07-31 RX ORDER — LOSARTAN POTASSIUM 100 MG/1
100 TABLET, FILM COATED ORAL DAILY
Refills: 0 | Status: DISCONTINUED | OUTPATIENT
Start: 2020-07-31 | End: 2020-07-31

## 2020-07-31 RX ORDER — PIPERACILLIN AND TAZOBACTAM 4; .5 G/20ML; G/20ML
3.38 INJECTION, POWDER, LYOPHILIZED, FOR SOLUTION INTRAVENOUS EVERY 8 HOURS
Refills: 0 | Status: DISCONTINUED | OUTPATIENT
Start: 2020-07-31 | End: 2020-08-02

## 2020-07-31 RX ORDER — CLONAZEPAM 1 MG
0.25 TABLET ORAL AT BEDTIME
Refills: 0 | Status: DISCONTINUED | OUTPATIENT
Start: 2020-07-31 | End: 2020-08-06

## 2020-07-31 RX ORDER — PIPERACILLIN AND TAZOBACTAM 4; .5 G/20ML; G/20ML
4.5 INJECTION, POWDER, LYOPHILIZED, FOR SOLUTION INTRAVENOUS ONCE
Refills: 0 | Status: DISCONTINUED | OUTPATIENT
Start: 2020-07-31 | End: 2020-07-31

## 2020-07-31 RX ORDER — AMLODIPINE BESYLATE 2.5 MG/1
10 TABLET ORAL DAILY
Refills: 0 | Status: DISCONTINUED | OUTPATIENT
Start: 2020-07-31 | End: 2020-07-31

## 2020-07-31 RX ORDER — CEFTRIAXONE 500 MG/1
1000 INJECTION, POWDER, FOR SOLUTION INTRAMUSCULAR; INTRAVENOUS ONCE
Refills: 0 | Status: COMPLETED | OUTPATIENT
Start: 2020-07-31 | End: 2020-07-31

## 2020-07-31 RX ORDER — METRONIDAZOLE 500 MG
500 TABLET ORAL EVERY 8 HOURS
Refills: 0 | Status: DISCONTINUED | OUTPATIENT
Start: 2020-07-31 | End: 2020-07-31

## 2020-07-31 RX ORDER — ESCITALOPRAM OXALATE 10 MG/1
10 TABLET, FILM COATED ORAL DAILY
Refills: 0 | Status: DISCONTINUED | OUTPATIENT
Start: 2020-07-31 | End: 2020-08-06

## 2020-07-31 RX ORDER — PIPERACILLIN AND TAZOBACTAM 4; .5 G/20ML; G/20ML
3.38 INJECTION, POWDER, LYOPHILIZED, FOR SOLUTION INTRAVENOUS ONCE
Refills: 0 | Status: COMPLETED | OUTPATIENT
Start: 2020-07-31 | End: 2020-07-31

## 2020-07-31 RX ORDER — LEVOTHYROXINE SODIUM 125 MCG
75 TABLET ORAL DAILY
Refills: 0 | Status: DISCONTINUED | OUTPATIENT
Start: 2020-07-31 | End: 2020-08-06

## 2020-07-31 RX ORDER — ACETAMINOPHEN 500 MG
650 TABLET ORAL EVERY 6 HOURS
Refills: 0 | Status: DISCONTINUED | OUTPATIENT
Start: 2020-07-31 | End: 2020-08-06

## 2020-07-31 RX ORDER — POLYETHYLENE GLYCOL 3350 17 G/17G
17 POWDER, FOR SOLUTION ORAL DAILY
Refills: 0 | Status: DISCONTINUED | OUTPATIENT
Start: 2020-07-31 | End: 2020-07-31

## 2020-07-31 RX ORDER — SENNA PLUS 8.6 MG/1
2 TABLET ORAL AT BEDTIME
Refills: 0 | Status: DISCONTINUED | OUTPATIENT
Start: 2020-07-31 | End: 2020-07-31

## 2020-07-31 RX ADMIN — PIPERACILLIN AND TAZOBACTAM 25 GRAM(S): 4; .5 INJECTION, POWDER, LYOPHILIZED, FOR SOLUTION INTRAVENOUS at 23:51

## 2020-07-31 RX ADMIN — Medication 1 TABLET(S): at 11:14

## 2020-07-31 RX ADMIN — QUETIAPINE FUMARATE 100 MILLIGRAM(S): 200 TABLET, FILM COATED ORAL at 23:50

## 2020-07-31 RX ADMIN — SODIUM CHLORIDE 1000 MILLILITER(S): 9 INJECTION INTRAMUSCULAR; INTRAVENOUS; SUBCUTANEOUS at 18:53

## 2020-07-31 RX ADMIN — Medication 0.25 MILLIGRAM(S): at 23:50

## 2020-07-31 RX ADMIN — PIPERACILLIN AND TAZOBACTAM 200 GRAM(S): 4; .5 INJECTION, POWDER, LYOPHILIZED, FOR SOLUTION INTRAVENOUS at 21:52

## 2020-07-31 RX ADMIN — CEFTRIAXONE 100 MILLIGRAM(S): 500 INJECTION, POWDER, FOR SOLUTION INTRAMUSCULAR; INTRAVENOUS at 17:59

## 2020-07-31 RX ADMIN — AMLODIPINE BESYLATE 10 MILLIGRAM(S): 2.5 TABLET ORAL at 11:14

## 2020-07-31 RX ADMIN — ESCITALOPRAM OXALATE 10 MILLIGRAM(S): 10 TABLET, FILM COATED ORAL at 11:14

## 2020-07-31 RX ADMIN — SODIUM CHLORIDE 1000 MILLILITER(S): 9 INJECTION INTRAMUSCULAR; INTRAVENOUS; SUBCUTANEOUS at 16:09

## 2020-07-31 RX ADMIN — Medication 1 MILLIGRAM(S): at 11:14

## 2020-07-31 RX ADMIN — Medication 75 MICROGRAM(S): at 11:14

## 2020-07-31 RX ADMIN — Medication 25 MILLIGRAM(S): at 11:14

## 2020-07-31 RX ADMIN — Medication 650 MILLIGRAM(S): at 19:24

## 2020-07-31 NOTE — ED PROVIDER NOTE - ATTENDING CONTRIBUTION TO CARE
DR. ARAUJO, ATTENDING MD-  I performed a face to face bedside interview with the patient regarding history of present illness, review of symptoms and past medical history. I completed an independent physical exam.  I have discussed the patient's plan of care with the resident.   Documentation as above in the note.    78 y/o female h/o dep, htn, hypothyroid [place] [facility] [place]/p for si/hi p/w fever, diarrhea, luekocytosis x1 day.  Apparently staff member on her floor covid +.  Pt endorses orthostatic syncope sx.  Denies ha, neck stiffness, cp, sob, cough, abd pain, n/v, dysuria, rash.  Febrile, appears fatigued, ctabil, s1s2 rrr no m/r/g, abd soft non ttp no r/g, no cva tenderness b/l, no leg swelling b/l, no rash.  Evaluate for sepsis, dehydration, anemia.  Obtain ekg cbc cmp blood cx x2 vbg ua ucx covid swab cxr give ivf broad spec abx admit for further care and evaluation.

## 2020-07-31 NOTE — ED ADULT TRIAGE NOTE - CHIEF COMPLAINT QUOTE
BIB EMS from [facility] L5 for fever and loose stools x 1 day  aide with pt WBC's 16,000  PMH: Depression, HTN, hypothyroidism
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
## 2020-07-31 NOTE — PROGRESS NOTE ADULT - ASSESSMENT
79F w/Bipolar disorder, anxiety/depression, HTN and hypothyroidism admitted to [facility] from 7/2-7/15 transferred to [facility] for ECT. Today with episode of unresponsiveness    # Unresponsiveness -witnessed with staff, episode brief, no seizure like activity reported, no LOC. No symptoms presently and patient is at her baseline state. Reported dizziness prior to episode? No reported post ictal like state making seizures less likely. +orthostatics, poor po hydration, episode of diarrhea, hence possible dehydration? Recommend to encourage PO hydration today. Check CBC and BMP.     #Orthostatic hypotension - encourage PO hydration. Given low normal BPs - dc losartan for now. Monitor BP.    #UTI: UCx +Aerococcus on 7/2, unclear if pathogenic   -s/p Ceftin x5 days  -Currently no urinary symptoms    #HTN: well controlled  -BP low normal now. Would dc losartan to avoid strict control in this elderly. C/w amlodipine If BP consistently elevated, can restart low dose losartan 25mg    #Hypothyroidism  -C/w Synthroid 75mcg oral daily    Plan discussed with staff

## 2020-07-31 NOTE — ED PROVIDER NOTE - OBJECTIVE STATEMENT
78 y/o F, hx of HTN, BIBEMS from [facility], with c/o fever since earlier this morning. Per aide, patient suffered from a syncopal episode after getting up this morning. She had LOC for about 30seconds, and some stool incontinence. Pt does not have any recollection of this. Work up at [facility] showed that the patient had orthostatic vitals, a fever, and a WBC 16, so she was sent to ED for evaluation. Of note, someone in the inpatient unit was recently diagnosed with covid, so they were worried about her possible amna it. Pt denies CP, SOB, HA, lightheadedness, palpitations, urinary symptoms, cough, n/v or abd pain.

## 2020-07-31 NOTE — ED PROVIDER NOTE - PROGRESS NOTE DETAILS
Resident: Freddie Hackett - Rectal temp 101. Given patient history of syncope and possible covid exposure while in the inpatient facility, will check blood cultures as well. Resident: Freddie Hackett - Rectal temp 101. Given patient history of syncope and possible covid exposure while in the inpatient facility, will check blood cultures as well, and give CTX. syncope, sepsis, possible covid Resident: Freddie Hackett - \[facility] [place] [facility]\""list contacted, agreed to admit for syncope, sepsis, possible covid. Will add vanco.

## 2020-07-31 NOTE — ED PROVIDER NOTE - CLINICAL SUMMARY MEDICAL DECISION MAKING FREE TEXT BOX
78 y/o F, hx of HTN, BIBEMS from [facility], for fever and syncope. Has 78 y/o F, hx of HTN, BIBEMS from [facility], for fever and syncope. Has possible covid exposure.  Physical exam is reassuring. Not febrile here, however will check rectal temp. Given history will perform syncope work up.  Plan: CBC, CMP, CXR, UA, EKG, trop.

## 2020-07-31 NOTE — ED PROVIDER NOTE - PHYSICAL EXAMINATION
PHYSICAL EXAM:  GENERAL: non-toxic appearing; in no respiratory distress  HEAD: Atraumatic, Normocephalic  EYES: PERRL, EOMs intact b/l w/out deficits, no conjunctival pallor  NECK: No JVD; FROM  CHEST/LUNG: CTAB no wheezes/rhonchi/rales  HEART: RRR no murmur/gallops/rubs  ABDOMEN: +BS, soft, NT, ND  EXTREMITIES: No LE edema, +2 radial pulses b/l, +2 DP/PT pulses b/l  MUSCULOSKELETAL: FROM of all 4 extremities  NERVOUS SYSTEM:  A&Ox3, No motor deficits or sensory deficits; CNII-XII intact; no focal neurologic deficits  SKIN:  No new rashes

## 2020-07-31 NOTE — H&P ADULT - ATTENDING COMMENTS
78yo F with PMH of HTN and hx bipolar disorder with psychotic features presents with syncope and sepsis 2/2 to likely intrabdominal source given diarrhea and LLQ/RLQ abdominal pain. Patient seen and examined at bedside, agree w/ resident exam. Patient NAD, mucous membranes moist, cardiac normal S1, S2, lungs CTAB, abdomen soft however w/ tenderness to light palpation LLQ/RLQ, lower extremities w/o edema, no nuchal rigidity, no rashes. Pending CT abdomen/ pelvis, stool studies, and cdiff. Patient on zoysn for presumed intraabdominal source. Broad coverage 2/2 coming from an institution and recent abx (s/p recent tx for UTI). UA negative, blood cultures drawn, COVID negative. Syncope likely 2/2 to orthostatic hypotension (orthostatics positive at [facility]), troponin negative, will monitor on tele for events. Low suspicion of seizure given lack of postictal state. Lactate wnl.   - CT abdomen pelvis IV contrast   - zosyn for intraabdominal source  - Cdiff, Stool PCR, Stool Culture   - f/u blood cx   - syncope monitor on telemetry, troponin negative, orthostatics repeat now s/p 2L of NS

## 2020-07-31 NOTE — PROGRESS NOTE ADULT - SUBJECTIVE AND OBJECTIVE BOX
CC/Reason for Consult: Syncope like event    SUBJECTIVE / OVERNIGHT EVENTS: Called to evaluate patient for possible syncope like event. Per nursing, patient this morning was unresponsive for 30 seconds. She was sitting up with eyes open and didnt respond to questions. Did reports feeling dizzy prior to the episode per staff. She came to it right away and was at baseline MS after that. Patient did not lose consciousness and did not pass out. Patient does not remember this event. She feels at baseline. Feels well. Reports having 1 episode of diarrhea today. Denies chest pain, SOB, palpitations, dizziness to me. No prior similar episodes.     MEDICATIONS  (STANDING):  acetaminophen   Tablet .. 650 milliGRAM(s) Oral once  amLODIPine   Tablet 10 milliGRAM(s) Oral daily  clonazePAM Oral Disintegrating Tablet 0.25 milliGRAM(s) Oral at bedtime  escitalopram 10 milliGRAM(s) Oral daily  levothyroxine 75 MICROGram(s) Oral daily  LORazepam     Tablet 1 milliGRAM(s) Oral two times a day  multivitamin 1 Tablet(s) Oral daily  pregabalin 25 milliGRAM(s) Oral two times a day  QUEtiapine 100 milliGRAM(s) Oral at bedtime    MEDICATIONS  (PRN):  acetaminophen   Tablet .. 325 milliGRAM(s) Oral every 6 hours PRN Moderate Pain (4 - 6)  haloperidol     Tablet 2 milliGRAM(s) Oral every 4 hours PRN Agitation  haloperidol    Injectable 2 milliGRAM(s) IntraMuscular once PRN Agitation  ondansetron   Disintegrating Tablet 4 milliGRAM(s) Oral every 8 hours PRN Nausea and/or Vomiting  polyethylene glycol 3350 17 Gram(s) Oral daily PRN Constipation  senna 2 Tablet(s) Oral at bedtime PRN constipation        CAPILLARY BLOOD GLUCOSE  POCT Blood Glucose.: 131 mg/dL (31 Jul 2020 07:06)    Vital Signs Last 24 Hrs  T(C): 37.1 (31 Jul 2020 06:48), Max: 37.4 (31 Jul 2020 05:54)  T(F): 98.7 (31 Jul 2020 06:48), Max: 99.4 (31 Jul 2020 05:54)  HR: 62 (31 Jul 2020 06:48) (62 - 62)  BP: 109/52 (31 Jul 2020 06:48) (109/52 - 109/52)  BP(mean): --  RR: 18 (31 Jul 2020 06:48) (18 - 18)  SpO2: 96% (31 Jul 2020 06:48) (96% - 96%)    Positive orthostatic --> 100 (Laying--> sitting)    PHYSICAL EXAM:  GENERAL: NAD, well-developed  HEAD:  Atraumatic, Normocephalic  EYES: EOMI, PERRLA, conjunctiva and sclera clear  NECK: Supple  CHEST/LUNG: Clear to auscultation bilaterally; No wheeze  HEART: Regular rate and rhythm; No murmurs, rubs, or gallops  ABDOMEN: Soft, Nontender, Nondistended; Bowel sounds present  EXTREMITIES:  2+ Peripheral Pulses, No clubbing, cyanosis, or edema  PSYCH: AAOx3  NEUROLOGY: non-focal  SKIN: No rashes or lesions    LABS:  BMP and CBC ordered.     RADIOLOGY & ADDITIONAL TESTS:    Imaging Personally Reviewed:    Consultant(s) Notes Reviewed:      Care Discussed with Consultants/Other Providers:

## 2020-07-31 NOTE — H&P ADULT - PROBLEM SELECTOR PLAN 3
Pt has bipolar disorder with psychotic features, depression, SI/HI multiple [facility] hospitalizations  -cont psychiatry medications: quetiapine, escitalopram, pregabalin Pt has bipolar disorder with psychotic features, depression, SI/HI multiple [facility] hospitalizations  -cont psychiatry medications: quetiapine, escitalopram, pregabalin  -On 1:1 due to psychiatric history, until psych recs come in AM

## 2020-07-31 NOTE — H&P ADULT - ASSESSMENT
78yo F with PMH of HTN and hx bipolar disorder with psychotic features presents with syncope and     # syncope  orthostatic v vasovagal v cardiac v intracranial v infection  -likely orthostatic due to long standing problem of dizziness after getting out of bed  -measure daily orthostatics  -tele, vitals regular  -repeat orthostatics volume down, loose stools, lost water in bowel  light headed when  -w/u for infection    #Abdominal pain  -likely due to bowel infection from exam rebound tenderness, diarrhea  -Obtain stool sample for GI panel PCR and Cdiff  -CT abd pelvis with contrast    #Hx bipolar disorder with depression  -cont all psychiatry medications    #HTN  -cont amlodipine 10 and losartan 100    #Proph  -DVT proph with Lovenox even with risk of bleeding?  -SCD compression 78yo F with PMH of HTN and hx bipolar disorder with psychotic features presents with syncope and     # syncope  orthostatic v vasovagal v cardiac v intracranial v infection  -likely orthostatic due to long standing problem of dizziness after getting out of bed  -measure daily orthostatics  -tele, vitals checked regularly Q6  -repeat orthostatics, she could be volume down from the loose stools  -w/u for infection    #Abdominal pain  -likely due to bowel infection from exam rebound tenderness, diarrhea  -Obtain stool sample for GI panel PCR and Cdiff and stool culture  -CT abd pelvis with contrast  -started Zosyn today    #Hx bipolar disorder with depression  -cont psychiatry medications: quetiapine, escitalopram, pregabalin    #HTN  -cont amlodipine 10 and losartan 100, monitor vitals    #Proph  -hold DVT proph with Lovenox until CT comes back negative with bleed  -Hold bowel prophylaxis due to possible enterocolitis or other GI infection  -Hold PPI due to potential Cdiff risk  -SCD compression 78yo F with PMH of HTN and hx bipolar disorder with psychotic features presents with syncope and abdominal pain, likely due to orthostatic hypotension and bowel obstruction/acute infection

## 2020-07-31 NOTE — H&P ADULT - NSICDXPASTMEDICALHX_GEN_ALL_CORE_FT
PAST MEDICAL HISTORY:  Hypertension     Hypothyroid     Psychiatric disorder bipolar disorder with psychotic features with depression and hx of SI/HI with multiple [facility] hospitalizations    Suicidal ideation

## 2020-07-31 NOTE — H&P ADULT - NSHPREVIEWOFSYSTEMS_GEN_ALL_CORE
REVIEW OF SYSTEMS:    CONSTITUTIONAL: +++fever, No weakness or chills  EYES/ENT: No visual changes;  No vertigo or throat pain   NECK: No pain or stiffness  RESPIRATORY: No cough, wheezing, hemoptysis; No shortness of breath  CARDIOVASCULAR: No chest pain or palpitations  GASTROINTESTINAL: Abdominal pain past few days, localized to b/l lower quadrants, doesn't travel, worse after eating, 2 episodes of nonbloody brown loose stools, No nausea, vomiting, or hematemesis; No constipation. No melena or hematochezia.  GENITOURINARY: No dysuria, frequency or hematuria  NEUROLOGICAL: No numbness or weakness  SKIN: No itching, rashes

## 2020-07-31 NOTE — ED PROVIDER NOTE - NS ED ROS FT
Gen: Denies weight loss; +fever  HEENT: Denies vision changes, ear pain, epistaxis, sore throat  CV: Denies chest pain, palpitations  Skin: Denies rash, erythema, color changes  Resp: Denies SOB, cough  Endo: Denies sensitivity to heat, cold, increased urination  GI: Denies abdominal pain, constipation, nausea, vomiting  Msk: Denies back pain, LE swelling, extremity pain  : Denies dysuria, increased frequency  Neuro: Denies weakness, numbness, tingling; +LOC/syncope, bowel incontinence  Psych: Denies hallucinations; +hx of psych  ROS statement: all other ROS negative except as per HPI

## 2020-07-31 NOTE — H&P ADULT - PROBLEM SELECTOR PLAN 6
-hold DVT proph with Lovenox until CT comes back negative with bleed  -Hold bowel prophylaxis due to possible enterocolitis or other GI infection  -Hold PPI due to potential Cdiff risk  -SCD compression

## 2020-07-31 NOTE — CHART NOTE - NSCHARTNOTEFT_GEN_A_CORE
Medical Rapid Response called at 6:36 for year old female presenting today for fall. Pt was seen lying in bed unresponsive in bed for 15-20 seconds until pt responded to verbal and physical stimuli. Upon arrival to unit, pt was sitting up in bed comfortably AAox4 in no apparent distress. pt states she had episode of dizziness after was using restroom and washing up causing her to fall into bed. Denies any head trauma, change in vision, SOB, n/v, chest pain.             Vital Signs Last 24 Hrs  T(C): 37.1 (31 Jul 2020 06:48), Max: 37.4 (31 Jul 2020 05:54)  T(F): 98.7 (31 Jul 2020 06:48), Max: 99.4 (31 Jul 2020 05:54)  HR: 62 (31 Jul 2020 06:48) (62 - 62)  BP: 109/52 (31 Jul 2020 06:48) (109/52 - 109/52)  BP(mean): --  RR: 18 (31 Jul 2020 06:48) (18 - 18)  SpO2: 96% (31 Jul 2020 06:48) (96% - 96%)      General: Pt laying in bed A&Ox4 in no acute distress.  Head: atraumatic; normocephalic.  Eyes: EOMI, light reactive to light.  Chest: S1 and s2, RRR  Resp: Lungs clear to auscultation b/l.  Neuro: non-focal. Able to move all extremities upon command with no difficulties.     Fall possibly attributed to orthostatic hypotension. pt encouraged to adequately hydrate with fluids and taking your time about a minute before getting out of bed or changing positions. Will continue to monitor overnight.

## 2020-07-31 NOTE — ED ADULT NURSE NOTE - OBJECTIVE STATEMENT
Pt received with chief complaint of fever. pt states she feels fine but that she was told she "passed out" at [facility], pt also endorses stomach pain yesterday. Pt febrile, rectal temp 101.1. pt denies pain, denies cp, denies sob. abdomen soft, non-distended. 20g PIV placed in L AC, labs sent. Pt safety maintianed. Will continue to monitor.

## 2020-07-31 NOTE — H&P ADULT - PROBLEM SELECTOR PLAN 2
-likely due to bowel infection from exam rebound tenderness, diarrhea  -Obtain stool sample for GI panel PCR and Cdiff and stool culture  -CT abd pelvis with contrast  -started Zosyn today -likely due to bowel infection from exam rebound tenderness, diarrhea  -Obtain stool sample for GI panel PCR and Cdiff and stool culture  -CT abd pelvis with contrast STAT  -started Zosyn today  -NPO  -IV fluids

## 2020-07-31 NOTE — H&P ADULT - NSHPLABSRESULTS_GEN_ALL_CORE
12.8   17.68 )-----------( 341      ( 2020 15:31 )             37.5           135  |  97<L>  |  16  ----------------------------<  126<H>  4.3   |  25  |  0.62    Ca    9.1      2020 15:31  Phos  3.0       Mg     1.7         TPro  6.8  /  Alb  3.8  /  TBili  0.3  /  DBili  x   /  AST  17  /  ALT  14  /  AlkPhos  74                Urinalysis Basic - ( 2020 16:58 )    Color: YELLOW / Appearance: CLEAR / S.014 / pH: 6.5  Gluc: NEGATIVE / Ketone: NEGATIVE  / Bili: NEGATIVE / Urobili: NORMAL   Blood: NEGATIVE / Protein: NEGATIVE / Nitrite: NEGATIVE   Leuk Esterase: NEGATIVE / RBC: x / WBC x   Sq Epi: x / Non Sq Epi: x / Bacteria: x            Lactate Trend            CAPILLARY BLOOD GLUCOSE      POCT Blood Glucose.: 131 mg/dL (2020 07:06)        Culture Results:   >100,000 CFU/ml Aerococcus species Aerococcus species  "Aerococcus spp. are predictably susceptible to penicillin,  ampicillin, tetracycline, and vancomycin.  All isolates are  resistant to sulfonamides"  <10,000 CFU/ml Normal Urogenital kaela present (07-15 @ 09:46)  Culture Results:   No Growth Final ( @ 14:16)  Culture Results:   >100,000 CFU/ml Aerococcus species Aerococcus species  "Aerococcus spp. are predictably susceptible to penicillin,  ampicillin, tetracycline, and vancomycin.  All isolates are  resistant to sulfonamides" ( @ 12:35)

## 2020-07-31 NOTE — ED ADULT NURSE NOTE - CHIEF COMPLAINT QUOTE
BIB EMS from [facility] L5 for fever and loose stools x 1 day  aide with pt WBC's 16,000  PMH: Depression, HTN, hypothyroidism

## 2020-07-31 NOTE — H&P ADULT - PROBLEM SELECTOR PLAN 1
orthostatic v vasovagal v cardiac v intracranial v infection  -likely orthostatic due to long standing problem of dizziness after getting out of bed  -measure daily orthostatics  -tele, vitals checked regularly Q6  -repeat orthostatics, she could be volume down from the loose stools  -w/u for infection orthostatic v vasovagal v cardiac v infection  -likely orthostatic due to long standing problem of dizziness after getting out of bed  -measure orthostatic vitals  -tele, vitals checked regularly Q6  -she could be volume down from loose stools  -w/u for infection  -Repeat EKG and check QTc

## 2020-08-01 DIAGNOSIS — F31.4 BIPOLAR DISORDER, CURRENT EPISODE DEPRESSED, SEVERE, WITHOUT PSYCHOTIC FEATURES: ICD-10-CM

## 2020-08-01 LAB
ANION GAP SERPL CALC-SCNC: 14 MMO/L — SIGNIFICANT CHANGE UP (ref 7–14)
BASOPHILS # BLD AUTO: 0.05 K/UL — SIGNIFICANT CHANGE UP (ref 0–0.2)
BASOPHILS NFR BLD AUTO: 0.3 % — SIGNIFICANT CHANGE UP (ref 0–2)
BUN SERPL-MCNC: 11 MG/DL — SIGNIFICANT CHANGE UP (ref 7–23)
CALCIUM SERPL-MCNC: 8.3 MG/DL — LOW (ref 8.4–10.5)
CHLORIDE SERPL-SCNC: 100 MMOL/L — SIGNIFICANT CHANGE UP (ref 98–107)
CO2 SERPL-SCNC: 22 MMOL/L — SIGNIFICANT CHANGE UP (ref 22–31)
CREAT SERPL-MCNC: 0.62 MG/DL — SIGNIFICANT CHANGE UP (ref 0.5–1.3)
CULTURE RESULTS: SIGNIFICANT CHANGE UP
EOSINOPHIL # BLD AUTO: 0.04 K/UL — SIGNIFICANT CHANGE UP (ref 0–0.5)
EOSINOPHIL NFR BLD AUTO: 0.3 % — SIGNIFICANT CHANGE UP (ref 0–6)
GLUCOSE BLDC GLUCOMTR-MCNC: 106 MG/DL — HIGH (ref 70–99)
GLUCOSE SERPL-MCNC: 111 MG/DL — HIGH (ref 70–99)
HCT VFR BLD CALC: 35.7 % — SIGNIFICANT CHANGE UP (ref 34.5–45)
HCV AB S/CO SERPL IA: 0.07 S/CO — SIGNIFICANT CHANGE UP (ref 0–0.99)
HCV AB SERPL-IMP: SIGNIFICANT CHANGE UP
HGB BLD-MCNC: 12.1 G/DL — SIGNIFICANT CHANGE UP (ref 11.5–15.5)
IMM GRANULOCYTES NFR BLD AUTO: 0.4 % — SIGNIFICANT CHANGE UP (ref 0–1.5)
LYMPHOCYTES # BLD AUTO: 1.39 K/UL — SIGNIFICANT CHANGE UP (ref 1–3.3)
LYMPHOCYTES # BLD AUTO: 9.2 % — LOW (ref 13–44)
MAGNESIUM SERPL-MCNC: 1.8 MG/DL — SIGNIFICANT CHANGE UP (ref 1.6–2.6)
MCHC RBC-ENTMCNC: 32.5 PG — SIGNIFICANT CHANGE UP (ref 27–34)
MCHC RBC-ENTMCNC: 33.9 % — SIGNIFICANT CHANGE UP (ref 32–36)
MCV RBC AUTO: 96 FL — SIGNIFICANT CHANGE UP (ref 80–100)
MONOCYTES # BLD AUTO: 1.2 K/UL — HIGH (ref 0–0.9)
MONOCYTES NFR BLD AUTO: 8 % — SIGNIFICANT CHANGE UP (ref 2–14)
NEUTROPHILS # BLD AUTO: 12.31 K/UL — HIGH (ref 1.8–7.4)
NEUTROPHILS NFR BLD AUTO: 81.8 % — HIGH (ref 43–77)
NRBC # FLD: 0 K/UL — SIGNIFICANT CHANGE UP (ref 0–0)
PHOSPHATE SERPL-MCNC: 2.7 MG/DL — SIGNIFICANT CHANGE UP (ref 2.5–4.5)
PLATELET # BLD AUTO: 297 K/UL — SIGNIFICANT CHANGE UP (ref 150–400)
PMV BLD: 9.8 FL — SIGNIFICANT CHANGE UP (ref 7–13)
POTASSIUM SERPL-MCNC: 3.6 MMOL/L — SIGNIFICANT CHANGE UP (ref 3.5–5.3)
POTASSIUM SERPL-SCNC: 3.6 MMOL/L — SIGNIFICANT CHANGE UP (ref 3.5–5.3)
RBC # BLD: 3.72 M/UL — LOW (ref 3.8–5.2)
RBC # FLD: 12.5 % — SIGNIFICANT CHANGE UP (ref 10.3–14.5)
SARS-COV-2 IGG SERPL QL IA: POSITIVE
SARS-COV-2 IGM SERPL IA-ACNC: 78.6 INDEX — HIGH
SODIUM SERPL-SCNC: 136 MMOL/L — SIGNIFICANT CHANGE UP (ref 135–145)
SPECIMEN SOURCE: SIGNIFICANT CHANGE UP
WBC # BLD: 15.05 K/UL — HIGH (ref 3.8–10.5)
WBC # FLD AUTO: 15.05 K/UL — HIGH (ref 3.8–10.5)

## 2020-08-01 PROCEDURE — 93010 ELECTROCARDIOGRAM REPORT: CPT

## 2020-08-01 PROCEDURE — 74177 CT ABD & PELVIS W/CONTRAST: CPT | Mod: 26

## 2020-08-01 PROCEDURE — 99221 1ST HOSP IP/OBS SF/LOW 40: CPT | Mod: GC

## 2020-08-01 PROCEDURE — 99233 SBSQ HOSP IP/OBS HIGH 50: CPT | Mod: GC

## 2020-08-01 RX ORDER — SODIUM CHLORIDE 9 MG/ML
1000 INJECTION, SOLUTION INTRAVENOUS
Refills: 0 | Status: DISCONTINUED | OUTPATIENT
Start: 2020-08-01 | End: 2020-08-05

## 2020-08-01 RX ORDER — LACTOBACILLUS ACIDOPHILUS 100MM CELL
1 CAPSULE ORAL DAILY
Refills: 0 | Status: DISCONTINUED | OUTPATIENT
Start: 2020-08-01 | End: 2020-08-02

## 2020-08-01 RX ORDER — HEPARIN SODIUM 5000 [USP'U]/ML
5000 INJECTION INTRAVENOUS; SUBCUTANEOUS EVERY 12 HOURS
Refills: 0 | Status: DISCONTINUED | OUTPATIENT
Start: 2020-08-01 | End: 2020-08-06

## 2020-08-01 RX ADMIN — Medication 1 TABLET(S): at 15:29

## 2020-08-01 RX ADMIN — ESCITALOPRAM OXALATE 10 MILLIGRAM(S): 10 TABLET, FILM COATED ORAL at 15:29

## 2020-08-01 RX ADMIN — Medication 75 MICROGRAM(S): at 06:47

## 2020-08-01 RX ADMIN — Medication 0.25 MILLIGRAM(S): at 22:15

## 2020-08-01 RX ADMIN — Medication 25 MILLIGRAM(S): at 17:46

## 2020-08-01 RX ADMIN — PIPERACILLIN AND TAZOBACTAM 25 GRAM(S): 4; .5 INJECTION, POWDER, LYOPHILIZED, FOR SOLUTION INTRAVENOUS at 22:15

## 2020-08-01 RX ADMIN — HEPARIN SODIUM 5000 UNIT(S): 5000 INJECTION INTRAVENOUS; SUBCUTANEOUS at 17:46

## 2020-08-01 RX ADMIN — QUETIAPINE FUMARATE 100 MILLIGRAM(S): 200 TABLET, FILM COATED ORAL at 23:05

## 2020-08-01 RX ADMIN — Medication 25 MILLIGRAM(S): at 06:47

## 2020-08-01 RX ADMIN — PIPERACILLIN AND TAZOBACTAM 25 GRAM(S): 4; .5 INJECTION, POWDER, LYOPHILIZED, FOR SOLUTION INTRAVENOUS at 15:28

## 2020-08-01 RX ADMIN — SODIUM CHLORIDE 75 MILLILITER(S): 9 INJECTION, SOLUTION INTRAVENOUS at 22:17

## 2020-08-01 RX ADMIN — PIPERACILLIN AND TAZOBACTAM 25 GRAM(S): 4; .5 INJECTION, POWDER, LYOPHILIZED, FOR SOLUTION INTRAVENOUS at 06:48

## 2020-08-01 NOTE — BEHAVIORAL HEALTH ASSESSMENT NOTE - OTHER
pt reports lives by self, as per ED behavioral health assessment note 7/20, lives w/ daughter and son in law in bed, not assessed "michm" slightly impaired - forgot how long ago   states she has felt like a burden on her daughter who is trying to help her

## 2020-08-01 NOTE — PROGRESS NOTE ADULT - SUBJECTIVE AND OBJECTIVE BOX
PROGRESS NOTE:   Authoted by Dr. Sue Mcmanus MD    Pager [phone] [facility], [id number] [facility]     Patient is a 79y old  Female who presents with a chief complaint of Syncope (2020 18:40)    SUBJECTIVE / OVERNIGHT EVENTS: No overnight events. Pt was in CT scan this AM.    REVIEW OF SYSTEMS:    CONSTITUTIONAL: No weakness, fevers or chills  EYES/ENT: No visual changes;  No vertigo or throat pain   NECK: No pain or stiffness  RESPIRATORY: No cough, wheezing, hemoptysis; No shortness of breath  CARDIOVASCULAR: No chest pain or palpitations  GASTROINTESTINAL: No abdominal or epigastric pain. No nausea, vomiting, or hematemesis; No diarrhea or constipation. No melena or hematochezia.  GENITOURINARY: No dysuria, frequency or hematuria  NEUROLOGICAL: No numbness or weakness  SKIN: No itching, rashes    MEDICATIONS  (STANDING):  clonazePAM Oral Disintegrating Tablet 0.25 milliGRAM(s) Oral at bedtime  escitalopram 10 milliGRAM(s) Oral daily  levothyroxine 75 MICROGram(s) Oral daily  piperacillin/tazobactam IVPB.. 3.375 Gram(s) IV Intermittent every 8 hours  pregabalin 25 milliGRAM(s) Oral two times a day  QUEtiapine 100 milliGRAM(s) Oral at bedtime    MEDICATIONS  (PRN):  acetaminophen   Tablet .. 650 milliGRAM(s) Oral every 6 hours PRN Temp greater or equal to 38C (100.4F), Moderate Pain (4 - 6), Severe Pain (7 - 10)      CAPILLARY BLOOD GLUCOSE        I&O's Summary    2020 07:01  -  01 Aug 2020 07:00  --------------------------------------------------------  IN: 780 mL / OUT: 0 mL / NET: 780 mL        PHYSICAL EXAM:  Vital Signs Last 24 Hrs  T(C): 36.9 (01 Aug 2020 06:38), Max: 38.4 (2020 12:32)  T(F): 98.4 (01 Aug 2020 06:38), Max: 101.2 (2020 12:32)  HR: 81 (01 Aug 2020 06:38) (66 - 87)  BP: 130/52 (01 Aug 2020 06:38) (121/69 - 159/63)  BP(mean): --  RR: 16 (01 Aug 2020 06:38) (16 - 20)  SpO2: 94% (01 Aug 2020 06:38) (94% - 98%)    CONSTITUTIONAL: NAD, well-developed  RESPIRATORY: Normal respiratory effort; lungs are clear to auscultation bilaterally  CARDIOVASCULAR: Regular rate and rhythm, normal S1 and S2, no murmur/rub/gallop; No lower extremity edema; Peripheral pulses are 2+ bilaterally  ABDOMEN: Nontender to palpation, normoactive bowel sounds, no rebound/guarding; No hepatosplenomegaly  MUSCLOSKELETAL: no clubbing or cyanosis of digits; no joint swelling or tenderness to palpation  PSYCH: A+O to person, place, and time; affect appropriate  NEURO: Non-focal, no tremors  SKIN: No rashes    LABS:                        12.1   15.05 )-----------( 297      ( 01 Aug 2020 06:54 )             35.7     08-01    136  |  100  |  11  ----------------------------<  111<H>  3.6   |  22  |  0.62    Ca    8.3<L>      01 Aug 2020 06:54  Phos  2.7     08-  Mg     1.8     08-    TPro  6.8  /  Alb  3.8  /  TBili  0.3  /  DBili  x   /  AST  17  /  ALT  14  /  AlkPhos  74  07-31    Urinalysis Basic - ( 2020 16:58 )    Color: YELLOW / Appearance: CLEAR / S.014 / pH: 6.5  Gluc: NEGATIVE / Ketone: NEGATIVE  / Bili: NEGATIVE / Urobili: NORMAL   Blood: NEGATIVE / Protein: NEGATIVE / Nitrite: NEGATIVE   Leuk Esterase: NEGATIVE / RBC: x / WBC x   Sq Epi: x / Non Sq Epi: x / Bacteria: x    RADIOLOGY & ADDITIONAL TESTS:  No new imaging or tests    COORDINATION OF CARE:  Care Discussed with Consultants/Other Providers [Y/N]:  Prior or Outpatient Records Reviewed [Y/N]: PROGRESS NOTE:   Authoted by Dr. Sue Mcmanus MD    Pager [phone] [facility], [id number] [facility]     Patient is a 79y old  Female who presents with a chief complaint of Syncope (2020 18:40)    SUBJECTIVE / OVERNIGHT EVENTS: No overnight events. Pt was in CT scan this AM. After she returned, pt says she has mostly been in bed so doesn't know if she was dizzy or not. She says abdominal pain decreased since yesterday. She denies nausea/vomiting, fever, chills, dysuria, diarrhea/constipation. She denies any chest pain, palpitations, weakness, dizziness, facial weakness. She is NPO.    REVIEW OF SYSTEMS:  CONSTITUTIONAL: No weakness, fevers or chills  EYES/ENT: No visual changes;  No vertigo or throat pain   NECK: No pain or stiffness  RESPIRATORY: No cough, wheezing, hemoptysis; No shortness of breath  CARDIOVASCULAR: No chest pain or palpitations  GASTROINTESTINAL: Abd pain improving from yesterday. Pt has mild rebound tenderness. No nausea, vomiting, or hematemesis; No diarrhea or constipation. No melena or hematochezia.  GENITOURINARY: No dysuria, frequency or hematuria  NEUROLOGICAL: No numbness or weakness  SKIN: No itching, rashes    MEDICATIONS  (STANDING):  clonazePAM Oral Disintegrating Tablet 0.25 milliGRAM(s) Oral at bedtime  escitalopram 10 milliGRAM(s) Oral daily  levothyroxine 75 MICROGram(s) Oral daily  piperacillin/tazobactam IVPB.. 3.375 Gram(s) IV Intermittent every 8 hours  pregabalin 25 milliGRAM(s) Oral two times a day  QUEtiapine 100 milliGRAM(s) Oral at bedtime    MEDICATIONS  (PRN):  acetaminophen   Tablet .. 650 milliGRAM(s) Oral every 6 hours PRN Temp greater or equal to 38C (100.4F), Moderate Pain (4 - 6), Severe Pain (7 - 10)    I&O's Summary    2020 07:01  -  01 Aug 2020 07:00  --------------------------------------------------------  IN: 780 mL / OUT: 0 mL / NET: 780 mL      PHYSICAL EXAM:  Vital Signs Last 24 Hrs  T(C): 36.9 (01 Aug 2020 06:38), Max: 38.4 (2020 12:32)  T(F): 98.4 (01 Aug 2020 06:38), Max: 101.2 (2020 12:32)  HR: 81 (01 Aug 2020 06:38) (66 - 87)  BP: 130/52 (01 Aug 2020 06:38) (121/69 - 159/63)  BP(mean): --  RR: 16 (01 Aug 2020 06:38) (16 - 20)  SpO2: 94% (01 Aug 2020 06:38) (94% - 98%)    CONSTITUTIONAL: NAD, well-developed  RESPIRATORY: Normal respiratory effort; lungs are clear to auscultation bilaterally  CARDIOVASCULAR: Regular rate and rhythm, normal S1 and S2, no murmur/rub/gallop; No lower extremity edema; Peripheral pulses are 2+ bilaterally  ABDOMEN: Slight abdominal tenderness on palpation of all 4 quadrants. Normal bowel sounds. Mild rebound tenderness  MUSCLOSKELETAL: no clubbing or cyanosis of digits; no joint swelling or tenderness to palpation  PSYCH: A+O to person, place, and time; affect appropriate  NEURO: Non-focal, no tremors  SKIN: No rashes    LABS:                        12.1   15.05 )-----------( 297      ( 01 Aug 2020 06:54 )             35.7     08-01    136  |  100  |  11  ----------------------------<  111<H>  3.6   |  22  |  0.62    Ca    8.3<L>      01 Aug 2020 06:54  Phos  2.7     08-  Mg     1.8     08-    TPro  6.8  /  Alb  3.8  /  TBili  0.3  /  DBili  x   /  AST  17  /  ALT  14  /  AlkPhos  74  07-31    Urinalysis Basic - ( 2020 16:58 )    Color: YELLOW / Appearance: CLEAR / S.014 / pH: 6.5  Gluc: NEGATIVE / Ketone: NEGATIVE  / Bili: NEGATIVE / Urobili: NORMAL   Blood: NEGATIVE / Protein: NEGATIVE / Nitrite: NEGATIVE   Leuk Esterase: NEGATIVE / RBC: x / WBC x   Sq Epi: x / Non Sq Epi: x / Bacteria: x    RADIOLOGY & ADDITIONAL TESTS:  No new imaging or tests    COORDINATION OF CARE:  Care Discussed with Consultants/Other Providers [Y/N]:  Prior or Outpatient Records Reviewed [Y/N]:    < from: CT Abdomen and Pelvis w/ IV Cont (20 @ 09:28) >  BOWEL: Enlarged, thickened diverticula in the sigmoid colon (2:68-2:72) and thickening of the sigmoid colon with surrounding fat stranding, likely diverticulitis. Thickening of the terminal ileum may be reactive. Colonic diverticulosis. No bowel obstruction. Appendix is normal.  PERITONEUM: Small pelvic ascites. No free air.  VESSELS: Within normal limits.  RETROPERITONEUM/LYMPH NODES: No lymphadenopathy.  ABDOMINAL WALL: Sutures along the anterior abdominal wall fossa.  BONES: Degenerative changes. Scoliosis of the lumbar spine.    IMPRESSION:  Sigmoid diverticulitis.

## 2020-08-01 NOTE — PROGRESS NOTE ADULT - ASSESSMENT
78yo F with PMH of HTN and hx bipolar disorder with psychotic features presents with syncope and     # syncope  orthostatic v vasovagal v cardiac v intracranial v infection  -likely orthostatic due to long standing problem of dizziness after getting out of bed  -measure daily orthostatics  -tele, vitals checked regularly Q6  -repeat orthostatics, she could be volume down from the loose stools  -w/u for infection    #Abdominal pain  -likely due to bowel infection from exam rebound tenderness, diarrhea  -Obtain stool sample for GI panel PCR and Cdiff and stool culture  -CT abd pelvis with contrast  -started Zosyn today    #Hx bipolar disorder with depression  -cont psychiatry medications: quetiapine, escitalopram, pregabalin    #HTN  -cont amlodipine 10 and losartan 100, monitor vitals    #Proph  -hold DVT proph with Lovenox until CT comes back negative with bleed  -Hold bowel prophylaxis due to possible enterocolitis or other GI infection  -Hold PPI due to potential Cdiff risk  -SCD compression 78yo F with PMH of HTN and hx bipolar disorder with psychotic features presents with syncope and abdominal pain, likely due to orthostatic hypotension and sigmoid diverticulitis found on abd CT today

## 2020-08-01 NOTE — BEHAVIORAL HEALTH ASSESSMENT NOTE - NSBHCHARTREVIEWIMAGING_PSY_A_CORE FT
< from: CT Abdomen and Pelvis w/ IV Cont (08.01.20 @ 09:28) >    IMPRESSION:  Sigmoid diverticulitis.    < end of copied text >

## 2020-08-01 NOTE — BEHAVIORAL HEALTH ASSESSMENT NOTE - NSBHCHARTREVIEWVS_PSY_A_CORE FT
Vital Signs Last 24 Hrs  T(C): 36.7 (01 Aug 2020 11:06), Max: 38.4 (31 Jul 2020 12:32)  T(F): 98 (01 Aug 2020 11:06), Max: 101.2 (31 Jul 2020 12:32)  HR: 69 (01 Aug 2020 11:06) (66 - 87)  BP: 130/50 (01 Aug 2020 11:06) (121/69 - 159/63)  BP(mean): --  RR: 18 (01 Aug 2020 11:06) (16 - 20)  SpO2: 96% (01 Aug 2020 11:06) (94% - 98%)

## 2020-08-01 NOTE — PROGRESS NOTE ADULT - ATTENDING COMMENTS
Patient seen and examined, case d/w house staff.  79F with PMH HTN, bipolar d/o on ECT at [facility], p/w syncope, lower abdominal pain, found to have acute sigmoid diverticulitis, started on zosyn.  Pt reports abd pain improved, denies active auditory/visual hallucination or SI/HI. Pt is on 1:1 CO.  PE mild LLQ and minimal RLQ tenderness    Assessment/plan:  # acute sigmoid diverticulitis, diarrhea: c/w zosyn, add lactobacillus, check stool for c.diff/PCR  change diet to NPO, if clinically improving can advance to clears tomorrow  # bipolar d/o, h/o SI/HI: no active SI/HI, no plan for ECT while inpt and no need for 1:1 CO per psych, cont psych meds (Klonopin, seroquel, ativan prn), monitor QTc on EKG to ensure QTc<500 ms  # syncope, +orthostasis: s/p 2 L NS, mIVF, monitor BP  # covid antibody +, PCR neg: recent exposure, no acute infection, satting well on RA  # DVT ppx Patient seen and examined, case d/w house staff.  79F with PMH HTN, bipolar d/o on ECT at [facility], p/w syncope, lower abdominal pain, found to have acute sigmoid diverticulitis, started on zosyn.  Pt reports abd pain improved, denies active auditory/visual hallucination or SI/HI. Pt is on 1:1 CO.  PE mild LLQ and minimal RLQ tenderness    Assessment/plan:  # acute sigmoid diverticulitis, diarrhea: c/w zosyn, add lactobacillus, check stool for c.diff/PCR  change diet to NPO, if clinically improving can advance to clears tomorrow  # bipolar d/o, h/o SI/HI: no active SI/HI, no plan for ECT while inpt and no need for 1:1 CO per psych, cont psych meds (lexapro, Klonopin, seroquel, ativan prn), monitor QTc on EKG to ensure QTc<500 ms  # syncope, +orthostasis: s/p 2 L NS, mIVF, monitor BP  # covid antibody +, PCR neg: recent exposure, no acute infection, satting well on RA  # DVT ppx

## 2020-08-01 NOTE — BEHAVIORAL HEALTH ASSESSMENT NOTE - NSBHCHARTREVIEWLAB_PSY_A_CORE FT
12.1   15.05 )-----------( 297      ( 01 Aug 2020 06:54 )             35.7     08-01    136  |  100  |  11  ----------------------------<  111<H>  3.6   |  22  |  0.62    Ca    8.3<L>      01 Aug 2020 06:54  Phos  2.7     08-  Mg     1.8     08-    TPro  6.8  /  Alb  3.8  /  TBili  0.3  /  DBili  x   /  AST  17  /  ALT  14  /  AlkPhos  74  07-31    Urinalysis Basic - ( 2020 16:58 )    Color: YELLOW / Appearance: CLEAR / S.014 / pH: 6.5  Gluc: NEGATIVE / Ketone: NEGATIVE  / Bili: NEGATIVE / Urobili: NORMAL   Blood: NEGATIVE / Protein: NEGATIVE / Nitrite: NEGATIVE   Leuk Esterase: NEGATIVE / RBC: x / WBC x   Sq Epi: x / Non Sq Epi: x / Bacteria: x      LIVER FUNCTIONS - ( 2020 15:31 )  Alb: 3.8 g/dL / Pro: 6.8 g/dL / ALK PHOS: 74 u/L / ALT: 14 u/L / AST: 17 u/L / GGT: x

## 2020-08-01 NOTE — BEHAVIORAL HEALTH ASSESSMENT NOTE - CASE SUMMARY
79 year-old  female, with history of Bipolar with psychotic features, Anxiety, Depression, with significant medication resistance, with prior multiple in-patient hospitalizations (~7 around age 30's) with positive responses to ECT and stable for 40 years; and 2 recent admissions this year (2020) between 2/2020 - 5/1/2020 with precipitant being a fall 3/2020 leading to being on medication and triggered psychiatric decompensation, with prior suicidal ideation and suicide attempt via cutting wrists (needing sutures), admitted to [facility] 7/15/20, for SI and HI in context of bipolar depression w/o psychosis, received 5x ECT treatments, next treatment was scheduled for 8/3, transferred from [facility] to [facility] on 7/31 for fever, found to be septic and admitted for workup w/ suspected GI infection. Patient continues to feel depressed with passive SI, no active SI/I/P, no HI, no psychosis or nena noted on assessment.She had the CT abdomen done today. She admits to feel hopeless and helpless and has passive death wishes but is no longer endorsing active suicidal ideation.    Plan: CL team willl co-ordinate with the ECT team re: restarting her ECT . Till then continue with the same medication management. No need for CO at this time.

## 2020-08-01 NOTE — PROGRESS NOTE ADULT - PROBLEM SELECTOR PLAN 1
orthostatic v vasovagal v cardiac v intracranial v infection  -likely orthostatic due to long standing problem of dizziness after getting out of bed  -measure daily orthostatics  -tele, vitals checked regularly Q6  -repeat orthostatics, she could be volume down from the loose stools  -w/u for infection orthostatic hypotension  -likely orthostatic due to long standing problem of dizziness after getting out of bed per pt  -orthostatic vitals are positive, >20mmHg change from sitting to standing  -w/u for infection  -Pt not on tele, serial EKGs performed, no abnormalities. Serial EKG and QTc checks, WNL for pt baseline

## 2020-08-01 NOTE — BEHAVIORAL HEALTH ASSESSMENT NOTE - HPI (INCLUDE ILLNESS QUALITY, SEVERITY, DURATION, TIMING, CONTEXT, MODIFYING FACTORS, ASSOCIATED SIGNS AND SYMPTOMS)
79 year-old  female, with history of Bipolar with psychotic features, Anxiety, Depression, with significant medication resistance, with prior multiple in-patient hospitalizations (~7 around age 30's) with positive responses to ECT and stable for 40 years; and 2 recent admissions this year (2020) between 2/2020 - 5/1/2020 with precipitant being a fall 3/2020 leading to being on medication and triggered psychiatric decompensation, with prior suicidal ideation and suicide attempt via cutting wrists (needing sutures), admitted to [facility] 7/15/20, for SI and HI in context of bipolar depression w/o psychosis, received 5x ECT treatments, next treatment was scheduled for 8/3, transferred from [facility] to [facility] on 7/31 for fever, found to be septic and admitted for workup w/ suspected GI infection.     Patient continues to endorse depressed mood, passive SI, states she wishes she could "close [her] eyes and disappear," but denies active SI, no intent, no plan, no HI. Patient thinks the ECT treatments are helping, but still feels "glum." Has poor appetite, good sleep. Got an abdominal CT today, reports some diarrhea, not too much abdominal pain. No psychosis or nena noted on assessment. Patient is concerned about memory deficits from ECT, but is a little hopeful that she will get better and she will be able to enjoy spending time with her grandchildren, who she states is what she looks forward to most.

## 2020-08-01 NOTE — BEHAVIORAL HEALTH ASSESSMENT NOTE - SUMMARY
79 year-old  female, with history of Bipolar with psychotic features, Anxiety, Depression, with significant medication resistance, with prior multiple in-patient hospitalizations (~7 around age 30's) with positive responses to ECT and stable for 40 years; and 2 recent admissions this year (2020) between 2/2020 - 5/1/2020 with precipitant being a fall 3/2020 leading to being on medication and triggered psychiatric decompensation, with prior suicidal ideation and suicide attempt via cutting wrists (needing sutures), admitted to [facility] 7/15/20, for SI and HI in context of bipolar depression w/o psychosis, received 5x ECT treatments, next treatment was scheduled for 8/3, transferred from [facility] to [facility] on 7/31 for fever, found to be septic and admitted for workup w/ suspected GI infection. Patient continues to feel depressed with passive SI, no active SI/I/P, no HI, no psychosis or nena noted on assessment.

## 2020-08-01 NOTE — PROGRESS NOTE ADULT - PROBLEM SELECTOR PLAN 2
-likely due to bowel infection from exam rebound tenderness, diarrhea  -Obtain stool sample for GI panel PCR and Cdiff and stool culture  -CT abd pelvis with contrast  -started Zosyn today -likely due to bowel infection from exam rebound tenderness, diarrhea. UA neg  -stool sample for GI panel PCR and Cdiff and stool culture pending  -CT abd pelvis with contrast showed sigmoid diverticulitis  -Start lactobacillus for GI proph  -continue Zosyn  -NPO, advance to clear liquids if patient tolerates  -IV fluids 1/2 normal saline with 20K

## 2020-08-01 NOTE — BEHAVIORAL HEALTH ASSESSMENT NOTE - RISK ASSESSMENT
Moderate Acute Suicide Risk Patient is at elevated risk given her recent SA, current depressed episode, hx multiple inpatient hospitalizations. Protective factors include no current active SI, admits to some improvement in mood, is help seeking, currently receiving treatment.

## 2020-08-01 NOTE — PROGRESS NOTE ADULT - PROBLEM SELECTOR PLAN 3
Pt has bipolar disorder with psychotic features, depression, SI/HI multiple [facility] hospitalizations  -cont psychiatry medications: quetiapine, escitalopram, pregabalin Pt has bipolar disorder with psychotic features, depression, SI/HI multiple [facility] hospitalizations  -cont psychiatry medications: quetiapine, escitalopram, pregabalin  -Per psych recs, pt no longer needs 1:1   -Pt was supposed tp have ECT treatment on Monday, appt cancelled till medical condition is managed
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
## 2020-08-01 NOTE — BEHAVIORAL HEALTH ASSESSMENT NOTE - PAST PSYCHOTROPIC MEDICATION
multiple failed medications over the years Hence. required ECT  Lithium, Buspar, Abilify, Remeron, Rexulti, Vraylar, Gabapentin, Cogentin

## 2020-08-01 NOTE — BEHAVIORAL HEALTH ASSESSMENT NOTE - NSBHCHARTREVIEWINVESTIGATE_PSY_A_CORE FT
< from: 12 Lead ECG (07.02.20 @ 12:36) >    Ventricular Rate 62 BPM    Atrial Rate 62 BPM    P-R Interval 144 ms    QRS Duration 130 ms    Q-T Interval 408 ms    QTC Calculation(Bezet) 414 ms    P Axis 27 degrees    R Axis 7 degrees    T Axis 14 degrees    Diagnosis Line Normal sinus rhythm  Right bundle branch block  Abnormal ECG  No previous ECGs available  Confirmed by Isaac MORGAN, Mike ([id number]) on 7/2/2020 9:15:36 PM    < end of copied text >

## 2020-08-01 NOTE — BEHAVIORAL HEALTH ASSESSMENT NOTE - NSBHCONSULTRECOMMENDOTHER_PSY_A_CORE FT
- Patient was planned for ECT on 8/3 - as patient is still undergoing workup for sepsis, and ECT at [facility] requires coordination, patient is to NOT receive ECT on 8/3. CL will continue to follow and be involved in further coordination for ECT.

## 2020-08-01 NOTE — BEHAVIORAL HEALTH ASSESSMENT NOTE - NSBHCONSULTMEDS_PSY_A_CORE FT
- Continue current medications:   Lexapro 10 mg daily, seroquel 100mg qhs, ativan 1mg BID, klonopin 0.25mg qhs, lyrica 25mg BID.

## 2020-08-02 DIAGNOSIS — K57.92 DIVERTICULITIS OF INTESTINE, PART UNSPECIFIED, WITHOUT PERFORATION OR ABSCESS WITHOUT BLEEDING: ICD-10-CM

## 2020-08-02 LAB
ANION GAP SERPL CALC-SCNC: 12 MMO/L — SIGNIFICANT CHANGE UP (ref 7–14)
BUN SERPL-MCNC: 8 MG/DL — SIGNIFICANT CHANGE UP (ref 7–23)
CALCIUM SERPL-MCNC: 8.5 MG/DL — SIGNIFICANT CHANGE UP (ref 8.4–10.5)
CHLORIDE SERPL-SCNC: 102 MMOL/L — SIGNIFICANT CHANGE UP (ref 98–107)
CO2 SERPL-SCNC: 23 MMOL/L — SIGNIFICANT CHANGE UP (ref 22–31)
CREAT SERPL-MCNC: 0.64 MG/DL — SIGNIFICANT CHANGE UP (ref 0.5–1.3)
CULTURE RESULTS: SIGNIFICANT CHANGE UP
GLUCOSE SERPL-MCNC: 97 MG/DL — SIGNIFICANT CHANGE UP (ref 70–99)
HCT VFR BLD CALC: 37.3 % — SIGNIFICANT CHANGE UP (ref 34.5–45)
HGB BLD-MCNC: 11.7 G/DL — SIGNIFICANT CHANGE UP (ref 11.5–15.5)
MAGNESIUM SERPL-MCNC: 1.8 MG/DL — SIGNIFICANT CHANGE UP (ref 1.6–2.6)
MCHC RBC-ENTMCNC: 31.1 PG — SIGNIFICANT CHANGE UP (ref 27–34)
MCHC RBC-ENTMCNC: 31.4 % — LOW (ref 32–36)
MCV RBC AUTO: 99.2 FL — SIGNIFICANT CHANGE UP (ref 80–100)
NRBC # FLD: 0 K/UL — SIGNIFICANT CHANGE UP (ref 0–0)
PHOSPHATE SERPL-MCNC: 2.6 MG/DL — SIGNIFICANT CHANGE UP (ref 2.5–4.5)
PLATELET # BLD AUTO: 270 K/UL — SIGNIFICANT CHANGE UP (ref 150–400)
PMV BLD: 10 FL — SIGNIFICANT CHANGE UP (ref 7–13)
POTASSIUM SERPL-MCNC: 3.5 MMOL/L — SIGNIFICANT CHANGE UP (ref 3.5–5.3)
POTASSIUM SERPL-SCNC: 3.5 MMOL/L — SIGNIFICANT CHANGE UP (ref 3.5–5.3)
RBC # BLD: 3.76 M/UL — LOW (ref 3.8–5.2)
RBC # FLD: 12.1 % — SIGNIFICANT CHANGE UP (ref 10.3–14.5)
SODIUM SERPL-SCNC: 137 MMOL/L — SIGNIFICANT CHANGE UP (ref 135–145)
SPECIMEN SOURCE: SIGNIFICANT CHANGE UP
WBC # BLD: 9.09 K/UL — SIGNIFICANT CHANGE UP (ref 3.8–10.5)
WBC # FLD AUTO: 9.09 K/UL — SIGNIFICANT CHANGE UP (ref 3.8–10.5)

## 2020-08-02 PROCEDURE — 99233 SBSQ HOSP IP/OBS HIGH 50: CPT | Mod: GC

## 2020-08-02 RX ORDER — METRONIDAZOLE 500 MG
TABLET ORAL
Refills: 0 | Status: DISCONTINUED | OUTPATIENT
Start: 2020-08-02 | End: 2020-08-06

## 2020-08-02 RX ORDER — CIPROFLOXACIN LACTATE 400MG/40ML
400 VIAL (ML) INTRAVENOUS EVERY 12 HOURS
Refills: 0 | Status: DISCONTINUED | OUTPATIENT
Start: 2020-08-02 | End: 2020-08-06

## 2020-08-02 RX ORDER — METRONIDAZOLE 500 MG
500 TABLET ORAL EVERY 8 HOURS
Refills: 0 | Status: DISCONTINUED | OUTPATIENT
Start: 2020-08-03 | End: 2020-08-06

## 2020-08-02 RX ORDER — AMLODIPINE BESYLATE 2.5 MG/1
10 TABLET ORAL DAILY
Refills: 0 | Status: DISCONTINUED | OUTPATIENT
Start: 2020-08-03 | End: 2020-08-06

## 2020-08-02 RX ORDER — METRONIDAZOLE 500 MG
500 TABLET ORAL ONCE
Refills: 0 | Status: COMPLETED | OUTPATIENT
Start: 2020-08-02 | End: 2020-08-02

## 2020-08-02 RX ORDER — LACTOBACILLUS ACIDOPHILUS 100MM CELL
1 CAPSULE ORAL
Refills: 0 | Status: DISCONTINUED | OUTPATIENT
Start: 2020-08-02 | End: 2020-08-06

## 2020-08-02 RX ADMIN — PIPERACILLIN AND TAZOBACTAM 25 GRAM(S): 4; .5 INJECTION, POWDER, LYOPHILIZED, FOR SOLUTION INTRAVENOUS at 06:01

## 2020-08-02 RX ADMIN — Medication 0.25 MILLIGRAM(S): at 20:12

## 2020-08-02 RX ADMIN — Medication 75 MICROGRAM(S): at 06:03

## 2020-08-02 RX ADMIN — PIPERACILLIN AND TAZOBACTAM 25 GRAM(S): 4; .5 INJECTION, POWDER, LYOPHILIZED, FOR SOLUTION INTRAVENOUS at 13:16

## 2020-08-02 RX ADMIN — HEPARIN SODIUM 5000 UNIT(S): 5000 INJECTION INTRAVENOUS; SUBCUTANEOUS at 06:03

## 2020-08-02 RX ADMIN — Medication 1 TABLET(S): at 13:16

## 2020-08-02 RX ADMIN — QUETIAPINE FUMARATE 100 MILLIGRAM(S): 200 TABLET, FILM COATED ORAL at 20:12

## 2020-08-02 RX ADMIN — ESCITALOPRAM OXALATE 10 MILLIGRAM(S): 10 TABLET, FILM COATED ORAL at 13:16

## 2020-08-02 RX ADMIN — HEPARIN SODIUM 5000 UNIT(S): 5000 INJECTION INTRAVENOUS; SUBCUTANEOUS at 17:08

## 2020-08-02 RX ADMIN — Medication 25 MILLIGRAM(S): at 17:08

## 2020-08-02 RX ADMIN — Medication 100 MILLIGRAM(S): at 20:12

## 2020-08-02 RX ADMIN — Medication 650 MILLIGRAM(S): at 23:30

## 2020-08-02 RX ADMIN — Medication 650 MILLIGRAM(S): at 22:30

## 2020-08-02 RX ADMIN — Medication 25 MILLIGRAM(S): at 06:12

## 2020-08-02 RX ADMIN — SODIUM CHLORIDE 75 MILLILITER(S): 9 INJECTION, SOLUTION INTRAVENOUS at 20:13

## 2020-08-02 RX ADMIN — SODIUM CHLORIDE 75 MILLILITER(S): 9 INJECTION, SOLUTION INTRAVENOUS at 06:12

## 2020-08-02 NOTE — PROGRESS NOTE ADULT - PROBLEM SELECTOR PLAN 3
Pt has bipolar disorder with psychotic features, depression, SI/HI multiple [facility] hospitalizations  -cont psychiatry medications: quetiapine, escitalopram, pregabalin  -Per psych recs, pt no longer needs 1:1   -Pt was supposed to have ECT treatment on Monday, appt cancelled till medical condition is managed

## 2020-08-02 NOTE — PROGRESS NOTE ADULT - PROBLEM SELECTOR PLAN 6
DVT ppx: hep sub q  -Hold bowel prophylaxis due to possible enterocolitis or other GI infection  -Hold PPI due to potential Cdiff risk

## 2020-08-02 NOTE — PROGRESS NOTE ADULT - ATTENDING COMMENTS
Patient seen and examined, case d/w house staff.  79F with PMH HTN, bipolar d/o on ECT at [facility], p/w syncope, lower abdominal pain, found to have acute sigmoid diverticulitis, started on zosyn.  Pt reports abd pain improved, denies active auditory/visual hallucination or SI/HI. Off 1:1 CO.   PE mild LLQ/RLQ tenderness    Assessment/plan:  # acute sigmoid diverticulitis, diarrhea: stool PCR e. coli, c/w zosyn,   lactobacillus, will consider transition to cipro/flagyl tomorrow  advance diet to clears, if tolerating then advance to full liquids tonight  # bipolar d/o, h/o SI/HI: no active SI/HI, no plan for ECT while inpt, off 1:1 CO, cont psych meds (lexapro, Klonopin, seroquel, ativan prn), monitor QTc on EKG to ensure QTc<500 ms  # syncope, +orthostasis: s/p 2 L NS, mIVF, monitor BP  # covid antibody +, PCR neg: recent exposure, no acute infection, satting well on RA  # DVT ppx  # dispo: d/c back to [facility] in 1-2 days Patient seen and examined, case d/w house staff.  79F with PMH HTN, bipolar d/o on ECT at [facility], p/w syncope, lower abdominal pain, found to have acute sigmoid diverticulitis, started on zosyn.  Pt reports abd pain improved, denies active auditory/visual hallucination or SI/HI. Off 1:1 CO.   PE mild LLQ/RLQ tenderness    Assessment/plan:  # acute sigmoid diverticulitis, diarrhea: stool PCR enteroaggregative e. coli, change abx to cipro/flagyl  lactobacillus  advance diet to clears, if tolerating then advance to full liquids tonight  # bipolar d/o, h/o SI/HI: no active SI/HI, no plan for ECT while inpt, off 1:1 CO, cont psych meds (lexapro, Klonopin, seroquel, ativan prn), monitor QTc on EKG to ensure QTc<500 ms  # syncope, +orthostasis: s/p 2 L NS, mIVF, monitor BP  # covid antibody +, PCR neg: recent exposure, no acute infection, satting well on RA  # DVT ppx  # dispo: d/c back to [facility] in 1-2 days

## 2020-08-02 NOTE — PROGRESS NOTE ADULT - SUBJECTIVE AND OBJECTIVE BOX
PROGRESS NOTE:   Authored by Allie Levine MD  Pager [phone] [facility], [id number] [facility]     Patient is a 79y old  Female who presents with a chief complaint of Syncope (01 Aug 2020 09:20)      SUBJECTIVE / OVERNIGHT EVENTS:   No acute events overnight.     ADDITIONAL REVIEW OF SYSTEMS:      MEDICATIONS  (STANDING):  clonazePAM Oral Disintegrating Tablet 0.25 milliGRAM(s) Oral at bedtime  dextrose 5% + sodium chloride 0.45% 1000 milliLiter(s) (75 mL/Hr) IV Continuous <Continuous>  escitalopram 10 milliGRAM(s) Oral daily  heparin   Injectable 5000 Unit(s) SubCutaneous every 12 hours  lactobacillus acidophilus 1 Tablet(s) Oral daily  levothyroxine 75 MICROGram(s) Oral daily  piperacillin/tazobactam IVPB.. 3.375 Gram(s) IV Intermittent every 8 hours  pregabalin 25 milliGRAM(s) Oral two times a day  QUEtiapine 100 milliGRAM(s) Oral at bedtime    MEDICATIONS  (PRN):  acetaminophen   Tablet .. 650 milliGRAM(s) Oral every 6 hours PRN Temp greater or equal to 38C (100.4F), Moderate Pain (4 - 6), Severe Pain (7 - 10)  LORazepam     Tablet 1 milliGRAM(s) Oral two times a day PRN Anxiety      CAPILLARY BLOOD GLUCOSE      POCT Blood Glucose.: 106 mg/dL (01 Aug 2020 16:47)    I&O's Summary      PHYSICAL EXAM:  Vital Signs Last 24 Hrs  T(C): 36.7 (01 Aug 2020 17:20), Max: 36.7 (01 Aug 2020 11:06)  T(F): 98.1 (01 Aug 2020 17:20), Max: 98.1 (01 Aug 2020 17:20)  HR: 71 (01 Aug 2020 17:20) (65 - 71)  BP: 140/45 (01 Aug 2020 17:20) (130/50 - 140/45)  BP(mean): --  RR: 18 (01 Aug 2020 17:20) (18 - 18)  SpO2: 95% (01 Aug 2020 17:20) (95% - 99%)    GENERAL: No acute distress, well-developed  HEAD:  Atraumatic, Normocephalic  EYES: EOMI, PERRLA, conjunctiva and sclera clear  NECK: Supple, no lymphadenopathy, no JVD  CHEST/LUNG: CTAB; No wheezes, rales, or rhonchi  HEART: Regular rate and rhythm; No murmurs, rubs, or gallops  ABDOMEN: Soft, non-tender, non-distended; normal bowel sounds, no organomegaly  EXTREMITIES:  2+ peripheral pulses b/l, No clubbing, cyanosis, or edema  NEUROLOGY: A&O x 3, no focal deficits  SKIN: No rashes or lesions    LABS:                        12.1   15.05 )-----------( 297      ( 01 Aug 2020 06:54 )             35.7     08-    136  |  100  |  11  ----------------------------<  111<H>  3.6   |  22  |  0.62    Ca    8.3<L>      01 Aug 2020 06:54  Phos  2.7     08-  Mg     1.8     -    TPro  6.8  /  Alb  3.8  /  TBili  0.3  /  DBili  x   /  AST  17  /  ALT  14  /  AlkPhos  74  07          Urinalysis Basic - ( 2020 16:58 )    Color: YELLOW / Appearance: CLEAR / S.014 / pH: 6.5  Gluc: NEGATIVE / Ketone: NEGATIVE  / Bili: NEGATIVE / Urobili: NORMAL   Blood: NEGATIVE / Protein: NEGATIVE / Nitrite: NEGATIVE   Leuk Esterase: NEGATIVE / RBC: x / WBC x   Sq Epi: x / Non Sq Epi: x / Bacteria: x        GI PCR Panel, Stool (collected 01 Aug 2020 16:31)  Source: .Stool Feces  Final Report (01 Aug 2020 21:10):    Enteroaggregative E. coli (EAEC)    DETECTED by PCR    *******Please Note:*******    GI panel PCR evaluates for:    Campylobacter, Plesiomonas shigelloides, Salmonella,    Vibrio, Yersinia enterocolitica, Enteroaggregative    Escherichia coli (EAEC), Enteropathogenic E.coli (EPEC),    Enterotoxigenic E. coli (ETEC) lt/st, Shiga-like    toxin-producing E. coli (STEC) stx1/stx2,    Shigella/ Enteroinvasive E. coli (EIEC), Cryptosporidium,    Cyclospora cayetanensis, Entamoeba histolytica,    Giardia lamblia, Adenovirus F 40/41, Astrovirus,    Norovirus GI/GII, Rotavirus A, Sapovirus    Culture - Blood (collected 2020 18:25)  Source: .Blood Blood-Peripheral  Preliminary Report (01 Aug 2020 19:02):    No growth to date.    Culture - Blood (collected 2020 18:25)  Source: .Blood Blood-Peripheral  Preliminary Report (01 Aug 2020 19:02):    No growth to date.        RADIOLOGY & ADDITIONAL TESTS:  Results Reviewed:   Imaging Personally Reviewed:  Electrocardiogram Personally Reviewed:    COORDINATION OF CARE:  Care Discussed with Consultants/Other Providers [Y/N]:  Prior or Outpatient Records Reviewed [Y/N]:    GI PCR Panel, Stool (20 @ 16:31)    Specimen Source: .Stool Feces    Culture Results:   Enteroaggregative E. coli (EAEC)  DETECTED by PCR PROGRESS NOTE:   Authored by Allie Levine MD  Pager [phone] [facility], [id number] [facility]     Patient is a 79y old  Female who presents with a chief complaint of Syncope (01 Aug 2020 09:20)      SUBJECTIVE / OVERNIGHT EVENTS:   No acute events overnight. Patient states she had BM this AM that was soft but brown, with no blood noted. Mostly formed. Has some abd tenderness. No appetite but denies N/V. No urinary issues.     ADDITIONAL REVIEW OF SYSTEMS:  as above.       MEDICATIONS  (STANDING):  clonazePAM Oral Disintegrating Tablet 0.25 milliGRAM(s) Oral at bedtime  dextrose 5% + sodium chloride 0.45% 1000 milliLiter(s) (75 mL/Hr) IV Continuous <Continuous>  escitalopram 10 milliGRAM(s) Oral daily  heparin   Injectable 5000 Unit(s) SubCutaneous every 12 hours  lactobacillus acidophilus 1 Tablet(s) Oral daily  levothyroxine 75 MICROGram(s) Oral daily  piperacillin/tazobactam IVPB.. 3.375 Gram(s) IV Intermittent every 8 hours  pregabalin 25 milliGRAM(s) Oral two times a day  QUEtiapine 100 milliGRAM(s) Oral at bedtime    MEDICATIONS  (PRN):  acetaminophen   Tablet .. 650 milliGRAM(s) Oral every 6 hours PRN Temp greater or equal to 38C (100.4F), Moderate Pain (4 - 6), Severe Pain (7 - 10)  LORazepam     Tablet 1 milliGRAM(s) Oral two times a day PRN Anxiety      CAPILLARY BLOOD GLUCOSE      POCT Blood Glucose.: 106 mg/dL (01 Aug 2020 16:47)    I&O's Summary      PHYSICAL EXAM:  Vital Signs Last 24 Hrs  T(C): 36.7 (01 Aug 2020 17:20), Max: 36.7 (01 Aug 2020 11:06)  T(F): 98.1 (01 Aug 2020 17:20), Max: 98.1 (01 Aug 2020 17:20)  HR: 71 (01 Aug 2020 17:20) (65 - 71)  BP: 140/45 (01 Aug 2020 17:20) (130/50 - 140/45)  BP(mean): --  RR: 18 (01 Aug 2020 17:20) (18 - 18)  SpO2: 95% (01 Aug 2020 17:20) (95% - 99%)    GENERAL: No acute distress, well-developed  HEAD:  Atraumatic, Normocephalic  EYES: EOMI, PERRL, conjunctiva and sclera clear  NECK: Supple, no lymphadenopathy, no JVD  CHEST/LUNG: CTAB; No wheezes, rales, or rhonchi  HEART: Regular rate and rhythm; No murmurs, rubs, or gallops  ABDOMEN: Soft, tenderness diffusely, non-distended; normal bowel sounds, no organomegaly  EXTREMITIES:  2+ peripheral pulses b/l, No clubbing, cyanosis, or edema  NEUROLOGY: A&O x 3, no focal deficits  SKIN: No rashes or lesions    LABS:                        12.1   15.05 )-----------( 297      ( 01 Aug 2020 06:54 )             35.7     08-    136  |  100  |  11  ----------------------------<  111<H>  3.6   |  22  |  0.62    Ca    8.3<L>      01 Aug 2020 06:54  Phos  2.7       Mg     1.8         TPro  6.8  /  Alb  3.8  /  TBili  0.3  /  DBili  x   /  AST  17  /  ALT  14  /  AlkPhos  74  07          Urinalysis Basic - ( 2020 16:58 )    Color: YELLOW / Appearance: CLEAR / S.014 / pH: 6.5  Gluc: NEGATIVE / Ketone: NEGATIVE  / Bili: NEGATIVE / Urobili: NORMAL   Blood: NEGATIVE / Protein: NEGATIVE / Nitrite: NEGATIVE   Leuk Esterase: NEGATIVE / RBC: x / WBC x   Sq Epi: x / Non Sq Epi: x / Bacteria: x        GI PCR Panel, Stool (collected 01 Aug 2020 16:31)  Source: .Stool Feces  Final Report (01 Aug 2020 21:10):    Enteroaggregative E. coli (EAEC)    DETECTED by PCR    *******Please Note:*******    GI panel PCR evaluates for:    Campylobacter, Plesiomonas shigelloides, Salmonella,    Vibrio, Yersinia enterocolitica, Enteroaggregative    Escherichia coli (EAEC), Enteropathogenic E.coli (EPEC),    Enterotoxigenic E. coli (ETEC) lt/st, Shiga-like    toxin-producing E. coli (STEC) stx1/stx2,    Shigella/ Enteroinvasive E. coli (EIEC), Cryptosporidium,    Cyclospora cayetanensis, Entamoeba histolytica,    Giardia lamblia, Adenovirus F 40/41, Astrovirus,    Norovirus GI/GII, Rotavirus A, Sapovirus    Culture - Blood (collected 2020 18:25)  Source: .Blood Blood-Peripheral  Preliminary Report (01 Aug 2020 19:02):    No growth to date.    Culture - Blood (collected 2020 18:25)  Source: .Blood Blood-Peripheral  Preliminary Report (01 Aug 2020 19:02):    No growth to date.        RADIOLOGY & ADDITIONAL TESTS:  Results Reviewed:   Imaging Personally Reviewed:  Electrocardiogram Personally Reviewed:    COORDINATION OF CARE:  Care Discussed with Consultants/Other Providers [Y/N]:  Prior or Outpatient Records Reviewed [Y/N]:    GI PCR Panel, Stool (20 @ 16:31)    Specimen Source: .Stool Feces    Culture Results:   Enteroaggregative E. coli (EAEC)  DETECTED by PCR

## 2020-08-02 NOTE — PROGRESS NOTE ADULT - PROBLEM SELECTOR PLAN 1
orthostatic hypotension  -likely orthostatic due to long standing problem of dizziness after getting out of bed per pt  -orthostatic vitals are positive, >20mmHg change from sitting to standing  -w/u for infection  -Pt not on tele, serial EKGs performed, no abnormalities. Serial EKG and QTc checks, WNL for pt baseline - abd tenderness with diarrhea   - stool GI PCR positive for EAEC (e.coli); Cdiff and stool culture pending  - CT abd pelvis with contrast showed sigmoid diverticulitis  - c/w lactobacillus for GI proph  -continue Zosyn D3  - will advance to [facility] today, will reassess later today   -IV fluids 1/2 normal saline with 20K; if tolerates PO can d/c

## 2020-08-02 NOTE — PROGRESS NOTE ADULT - PROBLEM SELECTOR PLAN 4
- was holding home amlodipine 10 and losartan 100  - will restart amlo 10 for tomorrow given -140s - was holding home amlodipine 10 and losartan 100  - will restart amlo 10 for tomorrow AM given -140s

## 2020-08-02 NOTE — PROGRESS NOTE ADULT - ASSESSMENT
78yo F with PMH of HTN and hx bipolar disorder with psychotic features presents with syncope and abdominal pain, likely due to orthostatic hypotension and sigmoid diverticulitis found on abd CT, stool culture positive for e. coli.

## 2020-08-02 NOTE — PROGRESS NOTE ADULT - PROBLEM SELECTOR PLAN 2
-likely due to bowel infection from exam rebound tenderness, diarrhea. UA neg  - stool GI PCR positive for EAEC (e.coli); Cdiff and stool culture pending  -CT abd pelvis with contrast showed sigmoid diverticulitis  -c/w lactobacillus for GI proph  -continue Zosyn D3  -NPO, advance to clear liquids if patient tolerates  -IV fluids 1/2 normal saline with 20K orthostatic hypotension  -likely orthostatic due to long standing problem of dizziness after getting out of bed per pt  -orthostatic vitals are positive, >20mmHg change from sitting to standing  -w/u for infection  -Pt not on tele, serial EKGs performed, no abnormalities. Serial EKG and QTc checks, WNL for pt baseline

## 2020-08-03 ENCOUNTER — TRANSCRIPTION ENCOUNTER (OUTPATIENT)
Age: 80
End: 2020-08-03

## 2020-08-03 DIAGNOSIS — Z02.9 ENCOUNTER FOR ADMINISTRATIVE EXAMINATIONS, UNSPECIFIED: ICD-10-CM

## 2020-08-03 LAB
ANION GAP SERPL CALC-SCNC: 12 MMO/L — SIGNIFICANT CHANGE UP (ref 7–14)
BUN SERPL-MCNC: 5 MG/DL — LOW (ref 7–23)
CALCIUM SERPL-MCNC: 8.9 MG/DL — SIGNIFICANT CHANGE UP (ref 8.4–10.5)
CHLORIDE SERPL-SCNC: 100 MMOL/L — SIGNIFICANT CHANGE UP (ref 98–107)
CO2 SERPL-SCNC: 24 MMOL/L — SIGNIFICANT CHANGE UP (ref 22–31)
CREAT SERPL-MCNC: 0.52 MG/DL — SIGNIFICANT CHANGE UP (ref 0.5–1.3)
CULTURE RESULTS: SIGNIFICANT CHANGE UP
GLUCOSE SERPL-MCNC: 109 MG/DL — HIGH (ref 70–99)
HCT VFR BLD CALC: 35.6 % — SIGNIFICANT CHANGE UP (ref 34.5–45)
HGB BLD-MCNC: 12.1 G/DL — SIGNIFICANT CHANGE UP (ref 11.5–15.5)
MAGNESIUM SERPL-MCNC: 1.8 MG/DL — SIGNIFICANT CHANGE UP (ref 1.6–2.6)
MCHC RBC-ENTMCNC: 32.6 PG — SIGNIFICANT CHANGE UP (ref 27–34)
MCHC RBC-ENTMCNC: 34 % — SIGNIFICANT CHANGE UP (ref 32–36)
MCV RBC AUTO: 96 FL — SIGNIFICANT CHANGE UP (ref 80–100)
NRBC # FLD: 0 K/UL — SIGNIFICANT CHANGE UP (ref 0–0)
PHOSPHATE SERPL-MCNC: 2.8 MG/DL — SIGNIFICANT CHANGE UP (ref 2.5–4.5)
PLATELET # BLD AUTO: 264 K/UL — SIGNIFICANT CHANGE UP (ref 150–400)
PMV BLD: 10.4 FL — SIGNIFICANT CHANGE UP (ref 7–13)
POTASSIUM SERPL-MCNC: 3.6 MMOL/L — SIGNIFICANT CHANGE UP (ref 3.5–5.3)
POTASSIUM SERPL-SCNC: 3.6 MMOL/L — SIGNIFICANT CHANGE UP (ref 3.5–5.3)
RBC # BLD: 3.71 M/UL — LOW (ref 3.8–5.2)
RBC # FLD: 12.1 % — SIGNIFICANT CHANGE UP (ref 10.3–14.5)
SODIUM SERPL-SCNC: 136 MMOL/L — SIGNIFICANT CHANGE UP (ref 135–145)
SPECIMEN SOURCE: SIGNIFICANT CHANGE UP
WBC # BLD: 9.87 K/UL — SIGNIFICANT CHANGE UP (ref 3.8–10.5)
WBC # FLD AUTO: 9.87 K/UL — SIGNIFICANT CHANGE UP (ref 3.8–10.5)

## 2020-08-03 PROCEDURE — 99233 SBSQ HOSP IP/OBS HIGH 50: CPT | Mod: GC

## 2020-08-03 PROCEDURE — 99233 SBSQ HOSP IP/OBS HIGH 50: CPT

## 2020-08-03 RX ORDER — FAMOTIDINE 10 MG/ML
20 INJECTION INTRAVENOUS
Refills: 0 | Status: DISCONTINUED | OUTPATIENT
Start: 2020-08-03 | End: 2020-08-06

## 2020-08-03 RX ORDER — KETOROLAC TROMETHAMINE 30 MG/ML
15 SYRINGE (ML) INJECTION EVERY 6 HOURS
Refills: 0 | Status: DISCONTINUED | OUTPATIENT
Start: 2020-08-03 | End: 2020-08-04

## 2020-08-03 RX ORDER — KETOROLAC TROMETHAMINE 30 MG/ML
15 SYRINGE (ML) INJECTION ONCE
Refills: 0 | Status: DISCONTINUED | OUTPATIENT
Start: 2020-08-03 | End: 2020-08-03

## 2020-08-03 RX ADMIN — Medication 0.25 MILLIGRAM(S): at 21:36

## 2020-08-03 RX ADMIN — Medication 15 MILLIGRAM(S): at 17:18

## 2020-08-03 RX ADMIN — Medication 25 MILLIGRAM(S): at 05:27

## 2020-08-03 RX ADMIN — Medication 25 MILLIGRAM(S): at 17:02

## 2020-08-03 RX ADMIN — SODIUM CHLORIDE 75 MILLILITER(S): 9 INJECTION, SOLUTION INTRAVENOUS at 10:29

## 2020-08-03 RX ADMIN — FAMOTIDINE 20 MILLIGRAM(S): 10 INJECTION INTRAVENOUS at 17:02

## 2020-08-03 RX ADMIN — Medication 15 MILLIGRAM(S): at 10:44

## 2020-08-03 RX ADMIN — ESCITALOPRAM OXALATE 10 MILLIGRAM(S): 10 TABLET, FILM COATED ORAL at 13:33

## 2020-08-03 RX ADMIN — Medication 200 MILLIGRAM(S): at 05:27

## 2020-08-03 RX ADMIN — Medication 15 MILLIGRAM(S): at 10:29

## 2020-08-03 RX ADMIN — FAMOTIDINE 20 MILLIGRAM(S): 10 INJECTION INTRAVENOUS at 10:29

## 2020-08-03 RX ADMIN — QUETIAPINE FUMARATE 100 MILLIGRAM(S): 200 TABLET, FILM COATED ORAL at 21:36

## 2020-08-03 RX ADMIN — Medication 1 TABLET(S): at 17:02

## 2020-08-03 RX ADMIN — Medication 15 MILLIGRAM(S): at 17:03

## 2020-08-03 RX ADMIN — AMLODIPINE BESYLATE 10 MILLIGRAM(S): 2.5 TABLET ORAL at 05:27

## 2020-08-03 RX ADMIN — Medication 15 MILLIGRAM(S): at 23:22

## 2020-08-03 RX ADMIN — HEPARIN SODIUM 5000 UNIT(S): 5000 INJECTION INTRAVENOUS; SUBCUTANEOUS at 17:03

## 2020-08-03 RX ADMIN — Medication 200 MILLIGRAM(S): at 17:02

## 2020-08-03 RX ADMIN — HEPARIN SODIUM 5000 UNIT(S): 5000 INJECTION INTRAVENOUS; SUBCUTANEOUS at 05:28

## 2020-08-03 RX ADMIN — Medication 100 MILLIGRAM(S): at 13:33

## 2020-08-03 RX ADMIN — Medication 1 TABLET(S): at 05:27

## 2020-08-03 RX ADMIN — Medication 15 MILLIGRAM(S): at 23:07

## 2020-08-03 RX ADMIN — Medication 100 MILLIGRAM(S): at 21:36

## 2020-08-03 RX ADMIN — Medication 100 MILLIGRAM(S): at 05:28

## 2020-08-03 RX ADMIN — SODIUM CHLORIDE 75 MILLILITER(S): 9 INJECTION, SOLUTION INTRAVENOUS at 17:06

## 2020-08-03 RX ADMIN — Medication 75 MICROGRAM(S): at 05:27

## 2020-08-03 NOTE — PROGRESS NOTE ADULT - PROBLEM SELECTOR PLAN 3
Pt has bipolar disorder with psychotic features, depression, SI/HI multiple [facility] hospitalizations  -cont psychiatry medications: quetiapine, escitalopram, pregabalin  -Per psych recs, pt no longer needs 1:1   -Pt was supposed to have ECT treatment on Monday, appt cancelled till medical condition is managed Pt has bipolar disorder with psychotic features, depression, SI/HI multiple [facility] hospitalizations  -cont psychiatry medications: quetiapine, escitalopram, pregabalin  -Per psych recs, pt no longer needs 1:1   -Pt was supposed to have ECT treatment 8/3, appt cancelled till medical condition is managed Pt has bipolar disorder with psychotic features, depression, SI/HI multiple [facility] hospitalizations  -cont psychiatry medications: quetiapine, escitalopram, pregabalin  -Per psych recs, pt no longer needs 1:1   -Pt was supposed to have ECT treatment 8/3, appt will be cancelled until medical condition is managed Pt has bipolar disorder with psychotic features, depression, SI/HI multiple [facility] hospitalizations  -cont psychiatry medications: quetiapine, escitalopram, pregabalin  -Per psych recs, pt no longer needs 1:1   -Pt was supposed to have ECT treatment 8/3, appt will be cancelled until medical condition is managed. Psych following, will plan for ECT on 8/4 if pt still admitted at [facility] Pt has bipolar disorder with psychotic features, depression, SI/HI multiple [facility] hospitalizations  -cont psychiatry medications: quetiapine, escitalopram, pregabalin  -Per psych recs, pt no longer needs 1:1   -Pt was supposed to have ECT treatment 8/3, appt will be cancelled until medical condition is managed. Psych following, will plan for ECT on 8/5 if pt still admitted at [facility] and medically clear.

## 2020-08-03 NOTE — DISCHARGE NOTE PROVIDER - NSFOLLOWUPCLINICS_GEN_ALL_ED_FT
[facility] Psychiatry  Psychiatry  [address] [place] [address]  Phone: [phone]  Fax:   Follow Up Time: 1 week

## 2020-08-03 NOTE — DISCHARGE NOTE PROVIDER - NSDCMRMEDTOKEN_GEN_ALL_CORE_FT
acetaminophen 325 mg oral tablet: 1 tab(s) orally every 6 hours, As needed, Moderate Pain (4 - 6)  amLODIPine 10 mg oral tablet: 1 tab(s) orally once a day  clonazePAM 0.25 mg oral tablet, disintegratin tab(s) orally once a day (at bedtime)  escitalopram 10 mg oral tablet: 1 tab(s) orally once a day  levothyroxine 75 mcg (0.075 mg) oral tablet: 1 tab(s) orally once a day  losartan 100 mg oral tablet: 1 tab(s) orally once a day  ondansetron 4 mg oral tablet, disintegratin tab(s) orally every 8 hours, As needed, Nausea and/or Vomiting  polyethylene glycol 3350 oral powder for reconstitution: 17 gram(s) orally once a day, As needed, Constipation  pregabalin 25 mg oral capsule: 1 cap(s) orally 2 times a day  QUEtiapine 100 mg oral tablet: 1 tab(s) orally once a day (at bedtime)  senna oral tablet: 2 tab(s) orally once a day (at bedtime), As needed, constipation acetaminophen 325 mg oral tablet: 1 tab(s) orally every 6 hours, As needed, Moderate Pain (4 - 6)  amLODIPine 10 mg oral tablet: 1 tab(s) orally once a day  ciprofloxacin 500 mg oral tablet: 1 tab(s) orally every 12 hours until 8/10/20    Meds to Beds  7S 749A as soon as possible  clonazePAM 0.25 mg oral tablet, disintegratin tab(s) orally once a day (at bedtime)  escitalopram 10 mg oral tablet: 1 tab(s) orally once a day  famotidine 20 mg oral tablet: 1 tab(s) orally 2 times a day    Meds to beds  7S 749A ASAP  lactobacillus acidophilus oral capsule: 1 cap(s) orally once a day or any other probiotics.   levothyroxine 75 mcg (0.075 mg) oral tablet: 1 tab(s) orally once a day  metroNIDAZOLE 500 mg oral tablet: 1 tab(s) orally every 8 hours until 8/10/20    Meds to Beds:  7S 749A as soon as possible.   pregabalin 25 mg oral capsule: 1 cap(s) orally 2 times a day  QUEtiapine 100 mg oral tablet: 1 tab(s) orally once a day (at bedtime)

## 2020-08-03 NOTE — DISCHARGE NOTE PROVIDER - NSDCCPCAREPLAN_GEN_ALL_CORE_FT
PRINCIPAL DISCHARGE DIAGNOSIS  Diagnosis: Syncope  Assessment and Plan of Treatment: You came to the hospital after fainting, and was found to have decreased blood pressure when you stand up and also an infection. You didn't have further episodes of fainting while in the hospital. Please make sure you keep good hydration, and change position slowly. Make sure to wear the compression stocking. Your infection is treated with antibiotics. Please also continue to hold your losartan since your blood pressure is in the normal range while taking amlodipine only. If you experience further episodes of fainting, please call your doctor and return to the hospital. Please follow up with your primary doctor for further monitoring.      SECONDARY DISCHARGE DIAGNOSES  Diagnosis: Diverticulitis  Assessment and Plan of Treatment: You were found to have bacterial infection in the large colon, and were treated with antibiotics. Please continue to take the antibiotics as prescribed until they are finished to prevent future complications. You still have some mild abdominal pain, which is common with an infection, and please take Tylenol as needed for pain control. Please follow up with your primary care doctor for further monitoring. If you have increased abdominal pain, fever, diarrhea, nausea or vomiting, please call your doctor and come back to the hospital.    Diagnosis: Sepsis  Assessment and Plan of Treatment: You came to the hospital with fever and high white blood cell count, and was found to have an infection. You were treated with antibiotics and the symptoms resolved except mild abdominal pain. Please follow up with your primary care doctor for further monitoring and continue the medications as prescribed.    Diagnosis: Bipolar disorder, current episode depressed, severe, without psychotic features  Assessment and Plan of Treatment: You received 1 session of ECT on 8/5 without complications while in the hospital, and will return to [facility] to continue your scheduled ECT sessions.    Diagnosis: Hypertension, unspecified type  Assessment and Plan of Treatment: Initially, your blood pressure medications were held in the setting of infection and fainting. Amlodipine was restarted and your blood pressure was well controlled while in the hospital. Please continue to hold losartan as long as your blood pressure is normal. Please follow up with your PCP to adjust blood pressure medications.

## 2020-08-03 NOTE — DISCHARGE NOTE PROVIDER - CARE PROVIDER_API CALL
Shoenfeld, Eric A  FAMILY MEDICINE  [address] [place] [address]  Phone: [phone]  Fax: [phone]  Established Patient  Follow Up Time: 2 weeks
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
## 2020-08-03 NOTE — PROGRESS NOTE ADULT - SUBJECTIVE AND OBJECTIVE BOX
PROGRESS NOTE:   Authoted by Dr. uSe Mcmanus MD    Pager [phone] [facility], [id number] [place]     Patient is a 79y old  Female who presents with a chief complaint of Syncope (02 Aug 2020 07:09)    SUBJECTIVE / OVERNIGHT EVENTS:     REVIEW OF SYSTEMS:    CONSTITUTIONAL: No weakness, fevers or chills  EYES/ENT: No visual changes;  No vertigo or throat pain   NECK: No pain or stiffness  RESPIRATORY: No cough, wheezing, hemoptysis; No shortness of breath  CARDIOVASCULAR: No chest pain or palpitations  GASTROINTESTINAL: No abdominal or epigastric pain. No nausea, vomiting, or hematemesis; No diarrhea or constipation. No melena or hematochezia.  GENITOURINARY: No dysuria, frequency or hematuria  NEUROLOGICAL: No numbness or weakness  SKIN: No itching, rashes    MEDICATIONS  (STANDING):  amLODIPine   Tablet 10 milliGRAM(s) Oral daily  ciprofloxacin   IVPB 400 milliGRAM(s) IV Intermittent every 12 hours  clonazePAM Oral Disintegrating Tablet 0.25 milliGRAM(s) Oral at bedtime  dextrose 5% + sodium chloride 0.45% 1000 milliLiter(s) (75 mL/Hr) IV Continuous <Continuous>  escitalopram 10 milliGRAM(s) Oral daily  heparin   Injectable 5000 Unit(s) SubCutaneous every 12 hours  lactobacillus acidophilus 1 Tablet(s) Oral two times a day  levothyroxine 75 MICROGram(s) Oral daily  metroNIDAZOLE  IVPB      metroNIDAZOLE  IVPB 500 milliGRAM(s) IV Intermittent every 8 hours  pregabalin 25 milliGRAM(s) Oral two times a day  QUEtiapine 100 milliGRAM(s) Oral at bedtime    MEDICATIONS  (PRN):  acetaminophen   Tablet .. 650 milliGRAM(s) Oral every 6 hours PRN Temp greater or equal to 38C (100.4F), Moderate Pain (4 - 6), Severe Pain (7 - 10)  LORazepam     Tablet 1 milliGRAM(s) Oral two times a day PRN Anxiety      CAPILLARY BLOOD GLUCOSE        I&O's Summary    01 Aug 2020 07:01  -  02 Aug 2020 07:00  --------------------------------------------------------  IN: 125 mL / OUT: 0 mL / NET: 125 mL    02 Aug 2020 07:01  -  03 Aug 2020 06:29  --------------------------------------------------------  IN: 1855 mL / OUT: 1000 mL / NET: 855 mL        PHYSICAL EXAM:  Vital Signs Last 24 Hrs  T(C): 37.2 (03 Aug 2020 05:26), Max: 37.2 (03 Aug 2020 05:26)  T(F): 99 (03 Aug 2020 05:26), Max: 99 (03 Aug 2020 05:26)  HR: 60 (03 Aug 2020 05:26) (60 - 70)  BP: 130/65 (03 Aug 2020 05:26) (130/65 - 161/64)  BP(mean): --  RR: 18 (03 Aug 2020 05:26) (18 - 18)  SpO2: 98% (03 Aug 2020 05:26) (98% - 100%)    CONSTITUTIONAL: NAD, well-developed  RESPIRATORY: Normal respiratory effort; lungs are clear to auscultation bilaterally  CARDIOVASCULAR: Regular rate and rhythm, normal S1 and S2, no murmur/rub/gallop; No lower extremity edema; Peripheral pulses are 2+ bilaterally  ABDOMEN: Nontender to palpation, normoactive bowel sounds, no rebound/guarding; No hepatosplenomegaly  MUSCLOSKELETAL: no clubbing or cyanosis of digits; no joint swelling or tenderness to palpation  PSYCH: A+O to person, place, and time; affect appropriate  NEURO: Non-focal, no tremors  SKIN: No rashes    LABS:                        11.7   9.09  )-----------( 270      ( 02 Aug 2020 06:30 )             37.3     08-02    137  |  102  |  8   ----------------------------<  97  3.5   |  23  |  0.64    Ca    8.5      02 Aug 2020 06:30  Phos  2.6     08-02  Mg     1.8     08-02                Culture - Stool (collected 01 Aug 2020 18:05)  Source: .Stool Feces  Preliminary Report (02 Aug 2020 16:25):    No enteric pathogens to date: Final culture pending    GI PCR Panel, Stool (collected 01 Aug 2020 16:31)  Source: .Stool Feces  Final Report (01 Aug 2020 21:10):    Enteroaggregative E. coli (EAEC)    DETECTED by PCR    *******Please Note:*******    GI panel PCR evaluates for:    Campylobacter, Plesiomonas shigelloides, Salmonella,    Vibrio, Yersinia enterocolitica, Enteroaggregative    Escherichia coli (EAEC), Enteropathogenic E.coli (EPEC),    Enterotoxigenic E. coli (ETEC) lt/st, Shiga-like    toxin-producing E. coli (STEC) stx1/stx2,    Shigella/ Enteroinvasive E. coli (EIEC), Cryptosporidium,    Cyclospora cayetanensis, Entamoeba histolytica,    Giardia lamblia, Adenovirus F 40/41, Astrovirus,    Norovirus GI/GII, Rotavirus A, Sapovirus    Culture - Blood (collected 31 Jul 2020 18:25)  Source: .Blood Blood-Peripheral  Preliminary Report (01 Aug 2020 19:02):    No growth to date.    Culture - Blood (collected 31 Jul 2020 18:25)  Source: .Blood Blood-Peripheral  Preliminary Report (01 Aug 2020 19:02):    No growth to date.    Culture - Urine (collected 31 Jul 2020 15:15)  Source: .Urine Clean Catch (Midstream)  Final Report (02 Aug 2020 14:18):    <10,000 CFU/mL Normal Urogenital Danisha        RADIOLOGY & ADDITIONAL TESTS:  No new imaging or tests    COORDINATION OF CARE:  Care Discussed with Consultants/Other Providers [Y/N]:  Prior or Outpatient Records Reviewed [Y/N]: PROGRESS NOTE:   Authoted by Dr. Linda Rutherford   Pager: [phone]    Patient is a 79y old  Female who presents with a chief complaint of Syncope (02 Aug 2020 07:09)    SUBJECTIVE / OVERNIGHT EVENTS:     REVIEW OF SYSTEMS:    CONSTITUTIONAL: No weakness, fevers or chills  EYES/ENT: No visual changes;  No vertigo or throat pain   NECK: No pain or stiffness  RESPIRATORY: No cough, wheezing, hemoptysis; No shortness of breath  CARDIOVASCULAR: No chest pain or palpitations  GASTROINTESTINAL: No abdominal or epigastric pain. No nausea, vomiting, or hematemesis; No diarrhea or constipation. No melena or hematochezia.  GENITOURINARY: No dysuria, frequency or hematuria  NEUROLOGICAL: No numbness or weakness  SKIN: No itching, rashes    MEDICATIONS  (STANDING):  amLODIPine   Tablet 10 milliGRAM(s) Oral daily  ciprofloxacin   IVPB 400 milliGRAM(s) IV Intermittent every 12 hours  clonazePAM Oral Disintegrating Tablet 0.25 milliGRAM(s) Oral at bedtime  dextrose 5% + sodium chloride 0.45% 1000 milliLiter(s) (75 mL/Hr) IV Continuous <Continuous>  escitalopram 10 milliGRAM(s) Oral daily  heparin   Injectable 5000 Unit(s) SubCutaneous every 12 hours  lactobacillus acidophilus 1 Tablet(s) Oral two times a day  levothyroxine 75 MICROGram(s) Oral daily  metroNIDAZOLE  IVPB      metroNIDAZOLE  IVPB 500 milliGRAM(s) IV Intermittent every 8 hours  pregabalin 25 milliGRAM(s) Oral two times a day  QUEtiapine 100 milliGRAM(s) Oral at bedtime    MEDICATIONS  (PRN):  acetaminophen   Tablet .. 650 milliGRAM(s) Oral every 6 hours PRN Temp greater or equal to 38C (100.4F), Moderate Pain (4 - 6), Severe Pain (7 - 10)  LORazepam     Tablet 1 milliGRAM(s) Oral two times a day PRN Anxiety      CAPILLARY BLOOD GLUCOSE        I&O's Summary    01 Aug 2020 07:01  -  02 Aug 2020 07:00  --------------------------------------------------------  IN: 125 mL / OUT: 0 mL / NET: 125 mL    02 Aug 2020 07:01  -  03 Aug 2020 06:29  --------------------------------------------------------  IN: 1855 mL / OUT: 1000 mL / NET: 855 mL        PHYSICAL EXAM:  Vital Signs Last 24 Hrs  T(C): 37.2 (03 Aug 2020 05:26), Max: 37.2 (03 Aug 2020 05:26)  T(F): 99 (03 Aug 2020 05:26), Max: 99 (03 Aug 2020 05:26)  HR: 60 (03 Aug 2020 05:26) (60 - 70)  BP: 130/65 (03 Aug 2020 05:26) (130/65 - 161/64)  BP(mean): --  RR: 18 (03 Aug 2020 05:26) (18 - 18)  SpO2: 98% (03 Aug 2020 05:26) (98% - 100%)    CONSTITUTIONAL: NAD, well-developed  RESPIRATORY: Normal respiratory effort; lungs are clear to auscultation bilaterally  CARDIOVASCULAR: Regular rate and rhythm, normal S1 and S2, no murmur/rub/gallop; No lower extremity edema; Peripheral pulses are 2+ bilaterally  ABDOMEN: Nontender to palpation, normoactive bowel sounds, no rebound/guarding; No hepatosplenomegaly  MUSCLOSKELETAL: no clubbing or cyanosis of digits; no joint swelling or tenderness to palpation  PSYCH: A+O to person, place, and time; affect appropriate  NEURO: Non-focal, no tremors  SKIN: No rashes    LABS:                        11.7   9.09  )-----------( 270      ( 02 Aug 2020 06:30 )             37.3     08-02    137  |  102  |  8   ----------------------------<  97  3.5   |  23  |  0.64    Ca    8.5      02 Aug 2020 06:30  Phos  2.6     08-02  Mg     1.8     08-02                Culture - Stool (collected 01 Aug 2020 18:05)  Source: .Stool Feces  Preliminary Report (02 Aug 2020 16:25):    No enteric pathogens to date: Final culture pending    GI PCR Panel, Stool (collected 01 Aug 2020 16:31)  Source: .Stool Feces  Final Report (01 Aug 2020 21:10):    Enteroaggregative E. coli (EAEC)    DETECTED by PCR    *******Please Note:*******    GI panel PCR evaluates for:    Campylobacter, Plesiomonas shigelloides, Salmonella,    Vibrio, Yersinia enterocolitica, Enteroaggregative    Escherichia coli (EAEC), Enteropathogenic E.coli (EPEC),    Enterotoxigenic E. coli (ETEC) lt/st, Shiga-like    toxin-producing E. coli (STEC) stx1/stx2,    Shigella/ Enteroinvasive E. coli (EIEC), Cryptosporidium,    Cyclospora cayetanensis, Entamoeba histolytica,    Giardia lamblia, Adenovirus F 40/41, Astrovirus,    Norovirus GI/GII, Rotavirus A, Sapovirus    Culture - Blood (collected 31 Jul 2020 18:25)  Source: .Blood Blood-Peripheral  Preliminary Report (01 Aug 2020 19:02):    No growth to date.    Culture - Blood (collected 31 Jul 2020 18:25)  Source: .Blood Blood-Peripheral  Preliminary Report (01 Aug 2020 19:02):    No growth to date.    Culture - Urine (collected 31 Jul 2020 15:15)  Source: .Urine Clean Catch (Midstream)  Final Report (02 Aug 2020 14:18):    <10,000 CFU/mL Normal Urogenital Danisha        RADIOLOGY & ADDITIONAL TESTS:  No new imaging or tests    COORDINATION OF CARE:  Care Discussed with Consultants/Other Providers [Y/N]:  Prior or Outpatient Records Reviewed [Y/N]: PROGRESS NOTE:   Authoted by Dr. Linda Rutherford   Pager: [phone]    Patient is a 79y old  Female who presents with a chief complaint of Syncope (02 Aug 2020 07:09)    SUBJECTIVE / OVERNIGHT EVENTS:   Overnight, pt complained of abdominal pain, received Tylenol.    REVIEW OF SYSTEMS:    CONSTITUTIONAL: No weakness, fevers or chills  EYES/ENT: No visual changes;  No vertigo or throat pain   NECK: No pain or stiffness  RESPIRATORY: No cough, wheezing, hemoptysis; No shortness of breath  CARDIOVASCULAR: No chest pain or palpitations  GASTROINTESTINAL: No abdominal or epigastric pain. No nausea, vomiting, or hematemesis; No diarrhea or constipation. No melena or hematochezia.  GENITOURINARY: No dysuria, frequency or hematuria  NEUROLOGICAL: No numbness or weakness  SKIN: No itching, rashes    MEDICATIONS  (STANDING):  amLODIPine   Tablet 10 milliGRAM(s) Oral daily  ciprofloxacin   IVPB 400 milliGRAM(s) IV Intermittent every 12 hours  clonazePAM Oral Disintegrating Tablet 0.25 milliGRAM(s) Oral at bedtime  dextrose 5% + sodium chloride 0.45% 1000 milliLiter(s) (75 mL/Hr) IV Continuous <Continuous>  escitalopram 10 milliGRAM(s) Oral daily  heparin   Injectable 5000 Unit(s) SubCutaneous every 12 hours  lactobacillus acidophilus 1 Tablet(s) Oral two times a day  levothyroxine 75 MICROGram(s) Oral daily  metroNIDAZOLE  IVPB      metroNIDAZOLE  IVPB 500 milliGRAM(s) IV Intermittent every 8 hours  pregabalin 25 milliGRAM(s) Oral two times a day  QUEtiapine 100 milliGRAM(s) Oral at bedtime    MEDICATIONS  (PRN):  acetaminophen   Tablet .. 650 milliGRAM(s) Oral every 6 hours PRN Temp greater or equal to 38C (100.4F), Moderate Pain (4 - 6), Severe Pain (7 - 10)  LORazepam     Tablet 1 milliGRAM(s) Oral two times a day PRN Anxiety      CAPILLARY BLOOD GLUCOSE        I&O's Summary    01 Aug 2020 07:01  -  02 Aug 2020 07:00  --------------------------------------------------------  IN: 125 mL / OUT: 0 mL / NET: 125 mL    02 Aug 2020 07:01  -  03 Aug 2020 06:29  --------------------------------------------------------  IN: 1855 mL / OUT: 1000 mL / NET: 855 mL        PHYSICAL EXAM:  Vital Signs Last 24 Hrs  T(C): 37.2 (03 Aug 2020 05:26), Max: 37.2 (03 Aug 2020 05:26)  T(F): 99 (03 Aug 2020 05:26), Max: 99 (03 Aug 2020 05:26)  HR: 60 (03 Aug 2020 05:26) (60 - 70)  BP: 130/65 (03 Aug 2020 05:26) (130/65 - 161/64)  BP(mean): --  RR: 18 (03 Aug 2020 05:26) (18 - 18)  SpO2: 98% (03 Aug 2020 05:26) (98% - 100%)    CONSTITUTIONAL: NAD, well-developed  RESPIRATORY: Normal respiratory effort; lungs are clear to auscultation bilaterally  CARDIOVASCULAR: Regular rate and rhythm, normal S1 and S2, no murmur/rub/gallop; No lower extremity edema; Peripheral pulses are 2+ bilaterally  ABDOMEN: Nontender to palpation, normoactive bowel sounds, no rebound/guarding; No hepatosplenomegaly  MUSCLOSKELETAL: no clubbing or cyanosis of digits; no joint swelling or tenderness to palpation  PSYCH: A+O to person, place, and time; affect appropriate  NEURO: Non-focal, no tremors  SKIN: No rashes    LABS:                        11.7   9.09  )-----------( 270      ( 02 Aug 2020 06:30 )             37.3     08-02    137  |  102  |  8   ----------------------------<  97  3.5   |  23  |  0.64    Ca    8.5      02 Aug 2020 06:30  Phos  2.6     08-02  Mg     1.8     08-02                Culture - Stool (collected 01 Aug 2020 18:05)  Source: .Stool Feces  Preliminary Report (02 Aug 2020 16:25):    No enteric pathogens to date: Final culture pending    GI PCR Panel, Stool (collected 01 Aug 2020 16:31)  Source: .Stool Feces  Final Report (01 Aug 2020 21:10):    Enteroaggregative E. coli (EAEC)    DETECTED by PCR    *******Please Note:*******    GI panel PCR evaluates for:    Campylobacter, Plesiomonas shigelloides, Salmonella,    Vibrio, Yersinia enterocolitica, Enteroaggregative    Escherichia coli (EAEC), Enteropathogenic E.coli (EPEC),    Enterotoxigenic E. coli (ETEC) lt/st, Shiga-like    toxin-producing E. coli (STEC) stx1/stx2,    Shigella/ Enteroinvasive E. coli (EIEC), Cryptosporidium,    Cyclospora cayetanensis, Entamoeba histolytica,    Giardia lamblia, Adenovirus F 40/41, Astrovirus,    Norovirus GI/GII, Rotavirus A, Sapovirus    Culture - Blood (collected 31 Jul 2020 18:25)  Source: .Blood Blood-Peripheral  Preliminary Report (01 Aug 2020 19:02):    No growth to date.    Culture - Blood (collected 31 Jul 2020 18:25)  Source: .Blood Blood-Peripheral  Preliminary Report (01 Aug 2020 19:02):    No growth to date.    Culture - Urine (collected 31 Jul 2020 15:15)  Source: .Urine Clean Catch (Midstream)  Final Report (02 Aug 2020 14:18):    <10,000 CFU/mL Normal Urogenital Danisha        RADIOLOGY & ADDITIONAL TESTS:  No new imaging or tests    COORDINATION OF CARE:  Care Discussed with Consultants/Other Providers [Y/N]:  Prior or Outpatient Records Reviewed [Y/N]: PROGRESS NOTE:   Authoted by Dr. Linda Rutherford   Pager: [phone]    Patient is a 79y old  Female who presents with a chief complaint of Syncope (02 Aug 2020 07:09)    SUBJECTIVE / OVERNIGHT EVENTS:   Overnight, pt complained of abdominal pain, received Tylenol.    This am, pt continued to complain of RLQ abdominal pain. She had 4-5 BMs overnight, solid in consistency. No n/v or other complaints. She notes that she has been having a decreased appetite.    REVIEW OF SYSTEMS:    CONSTITUTIONAL: No weakness, fevers or chills  EYES/ENT: No visual changes;  No vertigo or throat pain   NECK: No pain or stiffness  RESPIRATORY: No cough, wheezing, hemoptysis; No shortness of breath  CARDIOVASCULAR: No chest pain or palpitations  GASTROINTESTINAL: Positive for RLQ abdominal pain. No nausea, vomiting, or hematemesis; No diarrhea or constipation. No melena or hematochezia.   GENITOURINARY: No dysuria, frequency or hematuria  NEUROLOGICAL: No numbness or weakness  SKIN: No itching, rashes    MEDICATIONS  (STANDING):  amLODIPine   Tablet 10 milliGRAM(s) Oral daily  ciprofloxacin   IVPB 400 milliGRAM(s) IV Intermittent every 12 hours  clonazePAM Oral Disintegrating Tablet 0.25 milliGRAM(s) Oral at bedtime  dextrose 5% + sodium chloride 0.45% 1000 milliLiter(s) (75 mL/Hr) IV Continuous <Continuous>  escitalopram 10 milliGRAM(s) Oral daily  heparin   Injectable 5000 Unit(s) SubCutaneous every 12 hours  lactobacillus acidophilus 1 Tablet(s) Oral two times a day  levothyroxine 75 MICROGram(s) Oral daily  metroNIDAZOLE  IVPB      metroNIDAZOLE  IVPB 500 milliGRAM(s) IV Intermittent every 8 hours  pregabalin 25 milliGRAM(s) Oral two times a day  QUEtiapine 100 milliGRAM(s) Oral at bedtime    MEDICATIONS  (PRN):  acetaminophen   Tablet .. 650 milliGRAM(s) Oral every 6 hours PRN Temp greater or equal to 38C (100.4F), Moderate Pain (4 - 6), Severe Pain (7 - 10)  LORazepam     Tablet 1 milliGRAM(s) Oral two times a day PRN Anxiety      CAPILLARY BLOOD GLUCOSE        I&O's Summary    01 Aug 2020 07:01  -  02 Aug 2020 07:00  --------------------------------------------------------  IN: 125 mL / OUT: 0 mL / NET: 125 mL    02 Aug 2020 07:01  -  03 Aug 2020 06:29  --------------------------------------------------------  IN: 1855 mL / OUT: 1000 mL / NET: 855 mL        PHYSICAL EXAM:  Vital Signs Last 24 Hrs  T(C): 37.2 (03 Aug 2020 05:26), Max: 37.2 (03 Aug 2020 05:26)  T(F): 99 (03 Aug 2020 05:26), Max: 99 (03 Aug 2020 05:26)  HR: 60 (03 Aug 2020 05:26) (60 - 70)  BP: 130/65 (03 Aug 2020 05:26) (130/65 - 161/64)  BP(mean): --  RR: 18 (03 Aug 2020 05:26) (18 - 18)  SpO2: 98% (03 Aug 2020 05:26) (98% - 100%)    CONSTITUTIONAL: NAD, well-developed  RESPIRATORY: Normal respiratory effort; lungs are clear to auscultation bilaterally  CARDIOVASCULAR: Regular rate and rhythm, normal S1 and S2, no murmur/rub/gallop; No lower extremity edema; Peripheral pulses are 2+ bilaterally  ABDOMEN: normoactive bowel sounds. exquisite RLQ TTP. no rebound/guarding; No hepatosplenomegaly  MUSCLOSKELETAL: no clubbing or cyanosis of digits; no joint swelling or tenderness to palpation  PSYCH: A+O to person, place, and time; affect appropriate  NEURO: Non-focal, no tremors  SKIN: No rashes    LABS:                        11.7   9.09  )-----------( 270      ( 02 Aug 2020 06:30 )             37.3     08-02    137  |  102  |  8   ----------------------------<  97  3.5   |  23  |  0.64    Ca    8.5      02 Aug 2020 06:30  Phos  2.6     08-02  Mg     1.8     08-02                Culture - Stool (collected 01 Aug 2020 18:05)  Source: .Stool Feces  Preliminary Report (02 Aug 2020 16:25):    No enteric pathogens to date: Final culture pending    GI PCR Panel, Stool (collected 01 Aug 2020 16:31)  Source: .Stool Feces  Final Report (01 Aug 2020 21:10):    Enteroaggregative E. coli (EAEC)    DETECTED by PCR    *******Please Note:*******    GI panel PCR evaluates for:    Campylobacter, Plesiomonas shigelloides, Salmonella,    Vibrio, Yersinia enterocolitica, Enteroaggregative    Escherichia coli (EAEC), Enteropathogenic E.coli (EPEC),    Enterotoxigenic E. coli (ETEC) lt/st, Shiga-like    toxin-producing E. coli (STEC) stx1/stx2,    Shigella/ Enteroinvasive E. coli (EIEC), Cryptosporidium,    Cyclospora cayetanensis, Entamoeba histolytica,    Giardia lamblia, Adenovirus F 40/41, Astrovirus,    Norovirus GI/GII, Rotavirus A, Sapovirus    Culture - Blood (collected 31 Jul 2020 18:25)  Source: .Blood Blood-Peripheral  Preliminary Report (01 Aug 2020 19:02):    No growth to date.    Culture - Blood (collected 31 Jul 2020 18:25)  Source: .Blood Blood-Peripheral  Preliminary Report (01 Aug 2020 19:02):    No growth to date.    Culture - Urine (collected 31 Jul 2020 15:15)  Source: .Urine Clean Catch (Midstream)  Final Report (02 Aug 2020 14:18):    <10,000 CFU/mL Normal Urogenital Danisha        RADIOLOGY & ADDITIONAL TESTS:  No new imaging or tests    COORDINATION OF CARE:  Care Discussed with Consultants/Other Providers [Y/N]:  Prior or Outpatient Records Reviewed [Y/N]: PROGRESS NOTE:   Authoted by Dr. Linda Rutherford   Pager: [phone]    Patient is a 79y old  Female who presents with a chief complaint of Syncope (02 Aug 2020 07:09)    SUBJECTIVE / OVERNIGHT EVENTS:   Overnight, pt complained of abdominal pain, received Tylenol.    This am, pt continued to complain of RLQ abdominal pain. She had 4-5 BMs overnight, solid in consistency. No n/v or other complaints. She notes that she has been having a decreased appetite.    REVIEW OF SYSTEMS:    CONSTITUTIONAL: No weakness, fevers or chills  EYES/ENT: No visual changes;  No vertigo or throat pain   NECK: No pain or stiffness  RESPIRATORY: No cough, wheezing, hemoptysis; No shortness of breath  CARDIOVASCULAR: No chest pain or palpitations  GASTROINTESTINAL: Positive for RLQ abdominal pain. No nausea, vomiting, or hematemesis; No diarrhea or constipation. No melena or hematochezia.   GENITOURINARY: No dysuria, frequency or hematuria  NEUROLOGICAL: No numbness or weakness  SKIN: No itching, rashes    MEDICATIONS  (STANDING):  amLODIPine   Tablet 10 milliGRAM(s) Oral daily  ciprofloxacin   IVPB 400 milliGRAM(s) IV Intermittent every 12 hours  clonazePAM Oral Disintegrating Tablet 0.25 milliGRAM(s) Oral at bedtime  dextrose 5% + sodium chloride 0.45% 1000 milliLiter(s) (75 mL/Hr) IV Continuous <Continuous>  escitalopram 10 milliGRAM(s) Oral daily  heparin   Injectable 5000 Unit(s) SubCutaneous every 12 hours  lactobacillus acidophilus 1 Tablet(s) Oral two times a day  levothyroxine 75 MICROGram(s) Oral daily  metroNIDAZOLE  IVPB      metroNIDAZOLE  IVPB 500 milliGRAM(s) IV Intermittent every 8 hours  pregabalin 25 milliGRAM(s) Oral two times a day  QUEtiapine 100 milliGRAM(s) Oral at bedtime    MEDICATIONS  (PRN):  acetaminophen   Tablet .. 650 milliGRAM(s) Oral every 6 hours PRN Temp greater or equal to 38C (100.4F), Moderate Pain (4 - 6), Severe Pain (7 - 10)  LORazepam     Tablet 1 milliGRAM(s) Oral two times a day PRN Anxiety      CAPILLARY BLOOD GLUCOSE        I&O's Summary    01 Aug 2020 07:01  -  02 Aug 2020 07:00  --------------------------------------------------------  IN: 125 mL / OUT: 0 mL / NET: 125 mL    02 Aug 2020 07:01  -  03 Aug 2020 06:29  --------------------------------------------------------  IN: 1855 mL / OUT: 1000 mL / NET: 855 mL        PHYSICAL EXAM:  Vital Signs Last 24 Hrs  T(C): 37.2 (03 Aug 2020 05:26), Max: 37.2 (03 Aug 2020 05:26)  T(F): 99 (03 Aug 2020 05:26), Max: 99 (03 Aug 2020 05:26)  HR: 60 (03 Aug 2020 05:26) (60 - 70)  BP: 130/65 (03 Aug 2020 05:26) (130/65 - 161/64)  BP(mean): --  RR: 18 (03 Aug 2020 05:26) (18 - 18)  SpO2: 98% (03 Aug 2020 05:26) (98% - 100%)    CONSTITUTIONAL: NAD, well-developed  RESPIRATORY: Normal respiratory effort; lungs are clear to auscultation bilaterally  CARDIOVASCULAR: Regular rate and rhythm, normal S1 and S2, no murmur/rub/gallop; No lower extremity edema; Peripheral pulses are 2+ bilaterally  ABDOMEN: normoactive bowel sounds. exquisite RLQ TTP. no rebound/guarding; No hepatosplenomegaly  MUSCLOSKELETAL: no clubbing or cyanosis of digits; no joint swelling or tenderness to palpation  PSYCH: A+O to person, place, and time; affect appropriate  NEURO: Non-focal, no tremors  SKIN: No rashes    LABS:                        11.7   9.09  )-----------( 270      ( 02 Aug 2020 06:30 )             37.3     08-02    137  |  102  |  8   ----------------------------<  97  3.5   |  23  |  0.64    Ca    8.5      02 Aug 2020 06:30  Phos  2.6     08-02  Mg     1.8     08-02                Culture - Stool (collected 01 Aug 2020 18:05)  Source: .Stool Feces  Preliminary Report (02 Aug 2020 16:25):    No enteric pathogens to date: Final culture pending    GI PCR Panel, Stool (collected 01 Aug 2020 16:31)  Source: .Stool Feces  Final Report (01 Aug 2020 21:10):    Enteroaggregative E. coli (EAEC)    DETECTED by PCR    *******Please Note:*******    GI panel PCR evaluates for:    Campylobacter, Plesiomonas shigelloides, Salmonella,    Vibrio, Yersinia enterocolitica, Enteroaggregative    Escherichia coli (EAEC), Enteropathogenic E.coli (EPEC),    Enterotoxigenic E. coli (ETEC) lt/st, Shiga-like    toxin-producing E. coli (STEC) stx1/stx2,    Shigella/ Enteroinvasive E. coli (EIEC), Cryptosporidium,    Cyclospora cayetanensis, Entamoeba histolytica,    Giardia lamblia, Adenovirus F 40/41, Astrovirus,    Norovirus GI/GII, Rotavirus A, Sapovirus    Culture - Blood (collected 31 Jul 2020 18:25)  Source: .Blood Blood-Peripheral  Preliminary Report (01 Aug 2020 19:02):    No growth to date.    Culture - Blood (collected 31 Jul 2020 18:25)  Source: .Blood Blood-Peripheral  Preliminary Report (01 Aug 2020 19:02):    No growth to date.    Culture - Urine (collected 31 Jul 2020 15:15)  Source: .Urine Clean Catch (Midstream)  Final Report (02 Aug 2020 14:18):    <10,000 CFU/mL Normal Urogenital Danisha        RADIOLOGY & ADDITIONAL TESTS:  EKG: PACs seen. QTc at 458    COORDINATION OF CARE:  Care Discussed with Consultants/Other Providers [Y/N]:  Prior or Outpatient Records Reviewed [Y/N]:

## 2020-08-03 NOTE — PROGRESS NOTE BEHAVIORAL HEALTH - SUMMARY
79 year-old  female, with history of Bipolar with psychotic features, Anxiety, Depression, with significant medication resistance, with prior multiple in-patient hospitalizations (~7 around age 30's) with positive responses to ECT and stable for 40 years; and 2 recent admissions this year (2020) between 2/2020 - 5/1/2020 with precipitant being a fall 3/2020 leading to being on medication and triggered psychiatric decompensation, with prior suicidal ideation and suicide attempt via cutting wrists (needing sutures), admitted to [facility] 7/15/20, for SI and HI in context of bipolar depression w/o psychosis, received 5x ECT treatments, next treatment was scheduled for 8/3, transferred from [facility] to [facility] on 7/31 for fever, found to be septic and admitted for workup w/ suspected GI infection.    Today, patient continues to endorse depressed mood with dysphoric affect but is future oriented and demonstrates fair insight into psychiatric illness and continued need for psychiatric treatment. Patient denies SI/HI on interview today, no indication for CO 1:1 but would have low threshold in case patient becomes impulsive. Will likely defer ECT during medical hospitalization, but as condition improves, may consider re-initiation prior to transfer back to [facility] (which patient is in agreement with).     PLAN:   [] Lexapro 10 mg daily, seroquel 100mg qhs, ativan 1mg BID, klonopin 0.25mg qhs, lyrica 25mg BID.  haldol 2mg q6h PO/IM/IV PRN for agitation    [] Patient was planned for ECT on 8/3 at [facility] - as patient is still undergoing workup for sepsis, and ECT at [facility] requires coordination, patient is to NOT receive ECT on 8/3. CL will continue to follow and be involved in further coordination for ECT. 79 year-old  female, with history of Bipolar with psychotic features, Anxiety, Depression, with significant medication resistance, with prior multiple in-patient hospitalizations (~7 around age 30's) with positive responses to ECT and stable for 40 years; and 2 recent admissions this year (2020) between 2/2020 - 5/1/2020 with precipitant being a fall 3/2020 leading to being on medication and triggered psychiatric decompensation, with prior suicidal ideation and suicide attempt via cutting wrists (needing sutures), admitted to [facility] 7/15/20, for SI and HI in context of bipolar depression w/o psychosis, received 5x ECT treatments, next treatment was scheduled for 8/3, transferred from [facility] to [facility] on 7/31 for fever, found to be septic and admitted for workup w/ suspected GI infection.    Today, patient continues to endorse depressed mood with dysphoric affect but is future oriented and demonstrates fair insight into psychiatric illness and continued need for psychiatric treatment. Patient denies SI/HI on interview today, no indication for CO 1:1 but would have low threshold in case patient becomes impulsive. Will likely defer ECT during medical hospitalization, but as condition improves, may consider re-initiation prior to transfer back to [facility] (which patient is in agreement with).     PLAN:   [] Lexapro 10 mg daily, seroquel 100mg qhs, ativan 1mg BID, klonopin 0.25mg qhs, lyrica 25mg BID.  [] Can consider haldol 2mg q6h PO/IM/IV PRN for agitation  [] Patient was planned for ECT on 8/3 at [facility] - as patient is still undergoing workup for sepsis, and ECT at [facility] requires coordination, patient is to NOT receive ECT on 8/3.   [] if patient continues to be medically hospitalized, will consider pursuing ECT treatment on Wednesday at medical hospital   [] will return to [facility] once medically cleared, plan is for patient to [facility] voluntary legals

## 2020-08-03 NOTE — PROGRESS NOTE ADULT - PROBLEM SELECTOR PLAN 1
- abd tenderness with diarrhea   - stool GI PCR positive for EAEC (e.coli); Cdiff and stool culture pending  - CT abd pelvis with contrast showed sigmoid diverticulitis  - c/w lactobacillus for GI proph  -continue Zosyn D3  - will advance to CLD today, will reassess later today   -IV fluids 1/2 normal saline with 20K; if tolerates PO can d/c - abd tenderness with diarrhea   - stool GI PCR positive for EAEC (e.coli); Cdiff and stool culture pending  - CT abd pelvis with contrast showed sigmoid diverticulitis  - c/w lactobacillus for GI proph  -d/c Zosyn. Now on Cipro/Flagyl (started on 8/2)   -CLD this am, will reassess later today and advance to regular if tolerated.   -IV fluids 1/2 normal saline with 20K; if tolerates PO can d/c - abd tenderness with diarrhea   - stool GI PCR positive for EAEC (e.coli); Cdiff and stool culture pending  - CT abd pelvis with contrast showed sigmoid diverticulitis  - c/w lactobacillus for GI proph  -d/c Zosyn. Now on Cipro/Flagyl (started on 8/2)   -CLD this am, will reassess later today and advance to regular if tolerated.   -IV fluids D5 1/2 normal saline with 20K; if tolerates PO can d/c - abd tenderness with diarrhea   - stool GI PCR positive for EAEC (e.coli); Cdiff and stool culture pending  - CT abd pelvis with contrast showed sigmoid diverticulitis  - c/w lactobacillus for GI proph  -d/c Zosyn. Now on Cipro/Flagyl (started on 8/2)   -CLD this am, will reassess later today and advance to regular if tolerated.   -IV fluids D5 1/2 normal saline with 20K; if tolerates PO can d/c  -Given continued abd pain, will start 15 mg toradol IV, pepcid 20 mg PO - abd tenderness with diarrhea   - stool GI PCR positive for EAEC (e.coli); Cdiff and stool culture pending  - CT abd pelvis with contrast showed sigmoid diverticulitis  - c/w lactobacillus for GI proph  -d/c Zosyn. Now on Cipro/Flagyl (started on 8/2)   -CLD this am, advanced to reg diet  -IV fluids D5 1/2 normal saline with 20K; if tolerates PO can d/c  -Given continued abd pain, will start 15 mg toradol IV q6, pepcid 20 mg PO

## 2020-08-03 NOTE — PROGRESS NOTE BEHAVIORAL HEALTH - CASE SUMMARY
Chart reviewed. I agree with Dr. Sweeney' history, MSE, A/P with below additions. I personally saw and examined the patient. Patient states that her depression is improving, feels that ECT is helping. Wants to go back to [facility] to continue ECT. Denies SI right now. No AH/VH elicited. Thought process is linear. Good attention. Affect is fairly euthymic.  Plan per above.

## 2020-08-03 NOTE — PROGRESS NOTE ADULT - PROBLEM SELECTOR PLAN 2
orthostatic hypotension  -likely orthostatic due to long standing problem of dizziness after getting out of bed per pt  -orthostatic vitals are positive, >20mmHg change from sitting to standing  -Pt not on tele, serial EKGs performed, no abnormalities. Serial EKG and QTc checks, WNL for pt baseline orthostatic hypotension  -likely orthostatic due to long standing problem of dizziness after getting out of bed per pt  -orthostatic vitals are positive, >20mmHg change from sitting to standing  -Pt not on tele, serial EKGs performed, no abnormalities. Serial EKG and QTc checks, WNL for pt baseline  -This am, pt endorsed some lightheadedness when ambulating to the bathroom, will check orthostatics. If orthostatic, will give another bolus orthostatic hypotension  -likely orthostatic due to long standing problem of dizziness after getting out of bed per pt  -orthostatic vitals are positive, >20mmHg change from sitting to standing  -Pt not on tele, serial EKGs performed, no abnormalities. Serial EKG and QTc checks, WNL for pt baseline  -This am, pt endorsed some lightheadedness when ambulating to the bathroom. Orthostatics neg

## 2020-08-03 NOTE — DISCHARGE NOTE PROVIDER - NSDCCPTREATMENT_GEN_ALL_CORE_FT
PRINCIPAL PROCEDURE  Procedure: CT abdomen pelvis  Findings and Treatment: FINDINGS:  LOWER CHEST: Subsegmental atelectasis.  LIVER: Within normal limits.  BILE DUCTS: Normal caliber.  GALLBLADDER: Within normal limits.  SPLEEN: Within normal limits.  PANCREAS: Within normal limits.  ADRENALS: Within normal limits.  KIDNEYS/URETERS: Absent right kidney.  BLADDER: Within normal limits.  REPRODUCTIVE ORGANS: Uterus and adnexa within normal limits.  BOWEL: Enlarged, thickened diverticula in the sigmoid colon (2:68-2:72) and thickening of the sigmoid colon with surrounding fat stranding, likely diverticulitis. Thickening of the terminal ileum may be reactive. Colonic diverticulosis. No bowel obstruction. Appendix is normal.  PERITONEUM: Small pelvic ascites. No free air.  VESSELS: Within normal limits.  RETROPERITONEUM/LYMPH NODES: No lymphadenopathy.  ABDOMINAL WALL: Sutures along the anterior abdominal wall fossa.  BONES: Degenerative changes. Scoliosis of the lumbar spine.  IMPRESSION:  Sigmoid diverticulitis.

## 2020-08-03 NOTE — DISCHARGE NOTE PROVIDER - NSDCFUADDAPPT_GEN_ALL_CORE_FT
Please follow up with your primary care doctor to monitoring for infection, and adjust blood pressure medications.

## 2020-08-03 NOTE — PROGRESS NOTE BEHAVIORAL HEALTH - NSBHCONSULTRECOMMENDOTHER_PSY_A_CORE FT
- Patient was planned for ECT on 8/3 - as patient is still undergoing workup for sepsis, and ECT at [facility] requires coordination, patient is to NOT receive ECT on 8/3. CL will continue to follow and be involved in further coordination for ECT.

## 2020-08-03 NOTE — PROGRESS NOTE ADULT - ATTENDING COMMENTS
Patient seen and examined, case d/w house staff.  79F with PMH HTN, bipolar d/o on ECT at [facility], p/w syncope, lower abdominal pain, found to have acute sigmoid diverticulitis, started on zosyn.  Pt had dizziness yesterday and abd pain improved. No SI/HI, off CO.    PE mild LLQ/RLQ tenderness. Pt tolerating full liquid diet this am.     Assessment/plan:  # acute sigmoid diverticulitis, infectious diarrhea: stool PCR enteroaggregative e. coli, c/w cipro/flagyl to complete 10 day course  toradol for pain control, lactobacillus, GI ppx pepcid  advance diet to regular if tolerating full liquids  # bipolar d/o, h/o SI/HI: no active SI/HI, no plan for ECT while inpt, off 1:1 CO, cont psych meds (lexapro, Klonopin, seroquel, ativan prn), monitor QTc on EKG to ensure QTc<500 ms  # syncope, +orthostasis: s/p 2 L NS, mIVF, monitor BP  # covid antibody +, PCR neg: recent exposure, no acute infection, satting well on RA  # DVT ppx  # dispo: d/c plan to [facility], psych clearance.

## 2020-08-03 NOTE — PROGRESS NOTE ADULT - PROBLEM SELECTOR PLAN 6
DVT ppx: hep sub q  -Hold bowel prophylaxis due to possible enterocolitis or other GI infection  -Hold PPI due to potential Cdiff risk Diet: CLD, will advance to reg if tolerating clears  DVT ppx: hep sub q  -Hold bowel prophylaxis due to possible enterocolitis or other GI infection Diet: reg diet  DVT ppx: hep sub q  -Hold bowel prophylaxis due to possible enterocolitis or other GI infection

## 2020-08-03 NOTE — PROGRESS NOTE ADULT - PROBLEM SELECTOR PLAN 4
- was holding home amlodipine 10 and losartan 100  - restarted today amlodipine 10 for given -140s - was holding home amlodipine 10 and losartan 100  - restarted amlodipine 10 for given -140s

## 2020-08-04 LAB
ANION GAP SERPL CALC-SCNC: 11 MMO/L — SIGNIFICANT CHANGE UP (ref 7–14)
BUN SERPL-MCNC: 5 MG/DL — LOW (ref 7–23)
CALCIUM SERPL-MCNC: 8 MG/DL — LOW (ref 8.4–10.5)
CHLORIDE SERPL-SCNC: 101 MMOL/L — SIGNIFICANT CHANGE UP (ref 98–107)
CO2 SERPL-SCNC: 23 MMOL/L — SIGNIFICANT CHANGE UP (ref 22–31)
CREAT SERPL-MCNC: 0.6 MG/DL — SIGNIFICANT CHANGE UP (ref 0.5–1.3)
GLUCOSE SERPL-MCNC: 111 MG/DL — HIGH (ref 70–99)
HCT VFR BLD CALC: 35.1 % — SIGNIFICANT CHANGE UP (ref 34.5–45)
HGB BLD-MCNC: 11.2 G/DL — LOW (ref 11.5–15.5)
MAGNESIUM SERPL-MCNC: 1.5 MG/DL — LOW (ref 1.6–2.6)
MCHC RBC-ENTMCNC: 30.8 PG — SIGNIFICANT CHANGE UP (ref 27–34)
MCHC RBC-ENTMCNC: 31.9 % — LOW (ref 32–36)
MCV RBC AUTO: 96.4 FL — SIGNIFICANT CHANGE UP (ref 80–100)
NRBC # FLD: 0 K/UL — SIGNIFICANT CHANGE UP (ref 0–0)
PHOSPHATE SERPL-MCNC: 2.6 MG/DL — SIGNIFICANT CHANGE UP (ref 2.5–4.5)
PLATELET # BLD AUTO: 272 K/UL — SIGNIFICANT CHANGE UP (ref 150–400)
PMV BLD: 10.2 FL — SIGNIFICANT CHANGE UP (ref 7–13)
POTASSIUM SERPL-MCNC: 3.8 MMOL/L — SIGNIFICANT CHANGE UP (ref 3.5–5.3)
POTASSIUM SERPL-SCNC: 3.8 MMOL/L — SIGNIFICANT CHANGE UP (ref 3.5–5.3)
RBC # BLD: 3.64 M/UL — LOW (ref 3.8–5.2)
RBC # FLD: 11.9 % — SIGNIFICANT CHANGE UP (ref 10.3–14.5)
SODIUM SERPL-SCNC: 135 MMOL/L — SIGNIFICANT CHANGE UP (ref 135–145)
WBC # BLD: 8.72 K/UL — SIGNIFICANT CHANGE UP (ref 3.8–10.5)
WBC # FLD AUTO: 8.72 K/UL — SIGNIFICANT CHANGE UP (ref 3.8–10.5)

## 2020-08-04 PROCEDURE — 99233 SBSQ HOSP IP/OBS HIGH 50: CPT | Mod: GC

## 2020-08-04 PROCEDURE — 99233 SBSQ HOSP IP/OBS HIGH 50: CPT

## 2020-08-04 RX ORDER — MAGNESIUM SULFATE 500 MG/ML
1 VIAL (ML) INJECTION ONCE
Refills: 0 | Status: COMPLETED | OUTPATIENT
Start: 2020-08-04 | End: 2020-08-04

## 2020-08-04 RX ADMIN — Medication 200 MILLIGRAM(S): at 06:54

## 2020-08-04 RX ADMIN — AMLODIPINE BESYLATE 10 MILLIGRAM(S): 2.5 TABLET ORAL at 05:40

## 2020-08-04 RX ADMIN — Medication 100 MILLIGRAM(S): at 15:10

## 2020-08-04 RX ADMIN — Medication 1 TABLET(S): at 05:40

## 2020-08-04 RX ADMIN — Medication 650 MILLIGRAM(S): at 21:23

## 2020-08-04 RX ADMIN — Medication 75 MICROGRAM(S): at 05:40

## 2020-08-04 RX ADMIN — Medication 15 MILLIGRAM(S): at 05:54

## 2020-08-04 RX ADMIN — SODIUM CHLORIDE 75 MILLILITER(S): 9 INJECTION, SOLUTION INTRAVENOUS at 22:49

## 2020-08-04 RX ADMIN — ESCITALOPRAM OXALATE 10 MILLIGRAM(S): 10 TABLET, FILM COATED ORAL at 12:52

## 2020-08-04 RX ADMIN — HEPARIN SODIUM 5000 UNIT(S): 5000 INJECTION INTRAVENOUS; SUBCUTANEOUS at 17:30

## 2020-08-04 RX ADMIN — Medication 200 MILLIGRAM(S): at 17:29

## 2020-08-04 RX ADMIN — QUETIAPINE FUMARATE 100 MILLIGRAM(S): 200 TABLET, FILM COATED ORAL at 21:23

## 2020-08-04 RX ADMIN — Medication 650 MILLIGRAM(S): at 06:30

## 2020-08-04 RX ADMIN — Medication 650 MILLIGRAM(S): at 22:33

## 2020-08-04 RX ADMIN — Medication 650 MILLIGRAM(S): at 05:41

## 2020-08-04 RX ADMIN — Medication 25 MILLIGRAM(S): at 17:34

## 2020-08-04 RX ADMIN — FAMOTIDINE 20 MILLIGRAM(S): 10 INJECTION INTRAVENOUS at 17:30

## 2020-08-04 RX ADMIN — HEPARIN SODIUM 5000 UNIT(S): 5000 INJECTION INTRAVENOUS; SUBCUTANEOUS at 05:41

## 2020-08-04 RX ADMIN — Medication 100 MILLIGRAM(S): at 21:23

## 2020-08-04 RX ADMIN — Medication 100 MILLIGRAM(S): at 05:38

## 2020-08-04 RX ADMIN — Medication 0.25 MILLIGRAM(S): at 21:23

## 2020-08-04 RX ADMIN — Medication 1 TABLET(S): at 17:30

## 2020-08-04 RX ADMIN — Medication 25 MILLIGRAM(S): at 05:46

## 2020-08-04 RX ADMIN — FAMOTIDINE 20 MILLIGRAM(S): 10 INJECTION INTRAVENOUS at 05:46

## 2020-08-04 RX ADMIN — Medication 15 MILLIGRAM(S): at 05:39

## 2020-08-04 RX ADMIN — Medication 100 GRAM(S): at 08:58

## 2020-08-04 RX ADMIN — SODIUM CHLORIDE 75 MILLILITER(S): 9 INJECTION, SOLUTION INTRAVENOUS at 09:47

## 2020-08-04 NOTE — PROGRESS NOTE ADULT - PROBLEM SELECTOR PLAN 1
- abd tenderness with diarrhea   - stool GI PCR positive for EAEC (e.coli); Cdiff and stool culture pending  - CT abd pelvis with contrast showed sigmoid diverticulitis  - c/w lactobacillus for GI proph  -d/c Zosyn. Now on Cipro/Flagyl (started on 8/2)   -CLD this am, advanced to reg diet  -IV fluids D5 1/2 normal saline with 20K; if tolerates PO can d/c  -Given continued abd pain, will start 15 mg toradol IV q6, pepcid 20 mg PO - abd tenderness with diarrhea, now improving  - stool GI PCR positive for EAEC (e.coli)  - CT abd pelvis with contrast showed sigmoid diverticulitis  - c/w lactobacillus for GI proph  -d/c Zosyn. Now on Cipro/Flagyl (started on 8/2)   -tolerating reg diet  -IV fluids D5 1/2 normal saline with 20K; if tolerates PO can d/c  -Given continued abd pain, will start 15 mg toradol IV q6, pepcid 20 mg PO - abd tenderness with diarrhea, now improving  - stool GI PCR positive for EAEC (e.coli)  - CT abd pelvis with contrast showed sigmoid diverticulitis  - c/w lactobacillus for GI proph  -d/c Zosyn. Now on Cipro/Flagyl (started on 8/2)   -tolerating reg diet  -IV fluids D5 1/2 normal saline with 20K; if tolerates PO can d/c  -Given continued abd pain, will start 15 mg toradol IV q6, pepcid 20 mg PO  -temp 100.9 this am (8/4), will repeat cx, continue trending

## 2020-08-04 NOTE — PROGRESS NOTE ADULT - ATTENDING COMMENTS
Patient seen and examined, case d/w house staff.  79F with PMH HTN, bipolar d/o on ECT at [facility], p/w syncope, lower abdominal pain, found to have acute sigmoid diverticulitis, started on zosyn.   No SI/HI, off CO.      Assessment/plan:  # acute sigmoid diverticulitis, infectious diarrhea: febrile to 100.9F today, repeat culture   stool PCR enteroaggregative e. coli, c/w cipro/flagyl to complete 10 day course  pain control, lactobacillus, GI ppx pepcid  tolerating regular diet  # bipolar d/o, h/o SI/HI: no active SI/HI, plan for ECT tomorrow,  cont psych meds (lexapro, Klonopin, seroquel, ativan prn), monitor QTc on EKG to ensure QTc<500 ms  # syncope, +orthostasis: resolved, s/p 2 L NS, mIVF, monitor BP  # covid antibody +, PCR neg: recent exposure, no acute infection, satting well on RA  # DVT ppx  # dispo: d/c to [facility] when afebrile and infection controlled.

## 2020-08-04 NOTE — PROGRESS NOTE ADULT - SUBJECTIVE AND OBJECTIVE BOX
Internal Medicine Progress Note    =======================================================  CONTACT ALISA Rutherford M.D., PGY-1  Pager:  [phone] ([facility])  =======================================================    Patient is a 79y old  Female who presents with a chief complaint of Syncope (03 Aug 2020 15:56)      SUBJECTIVE / OVERNIGHT EVENTS:    MEDICATIONS  (STANDING):  amLODIPine   Tablet 10 milliGRAM(s) Oral daily  ciprofloxacin   IVPB 400 milliGRAM(s) IV Intermittent every 12 hours  clonazePAM Oral Disintegrating Tablet 0.25 milliGRAM(s) Oral at bedtime  dextrose 5% + sodium chloride 0.45% 1000 milliLiter(s) (75 mL/Hr) IV Continuous <Continuous>  escitalopram 10 milliGRAM(s) Oral daily  famotidine    Tablet 20 milliGRAM(s) Oral two times a day  heparin   Injectable 5000 Unit(s) SubCutaneous every 12 hours  lactobacillus acidophilus 1 Tablet(s) Oral two times a day  levothyroxine 75 MICROGram(s) Oral daily  metroNIDAZOLE  IVPB      metroNIDAZOLE  IVPB 500 milliGRAM(s) IV Intermittent every 8 hours  pregabalin 25 milliGRAM(s) Oral two times a day  QUEtiapine 100 milliGRAM(s) Oral at bedtime    MEDICATIONS  (PRN):  acetaminophen   Tablet .. 650 milliGRAM(s) Oral every 6 hours PRN Temp greater or equal to 38C (100.4F), Moderate Pain (4 - 6), Severe Pain (7 - 10)  LORazepam     Tablet 1 milliGRAM(s) Oral two times a day PRN Anxiety    Vital Signs Last 24 Hrs  T(C): 38.3 (04 Aug 2020 05:17), Max: 38.3 (04 Aug 2020 05:17)  T(F): 100.9 (04 Aug 2020 05:17), Max: 100.9 (04 Aug 2020 05:17)  HR: 61 (04 Aug 2020 05:17) (60 - 74)  BP: 148/58 (04 Aug 2020 05:17) (130/64 - 148/58)  BP(mean): --  RR: 17 (04 Aug 2020 05:17) (17 - 18)  SpO2: 99% (04 Aug 2020 05:17) (97% - 100%)    CAPILLARY BLOOD GLUCOSE        I&O's Summary    02 Aug 2020 07:01  -  03 Aug 2020 07:00  --------------------------------------------------------  IN: 1930 mL / OUT: 1000 mL / NET: 930 mL    03 Aug 2020 07:01  -  04 Aug 2020 06:28  --------------------------------------------------------  IN: 2400 mL / OUT: 1200 mL / NET: 1200 mL        PHYSICAL EXAM  GENERAL: NAD  HEAD:  Atraumatic  EYES: EOMI, PERRLA, conjunctiva and sclera clear  NECK: Supple, No JVD  CHEST/LUNG: Clear to auscultation bilaterally; No wheeze  HEART: Regular rate and rhythm; No murmurs, rubs, or gallops  ABDOMEN: Soft, Nontender, Nondistended; Bowel sounds present  EXTREMITIES:  2+ Peripheral Pulses, No clubbing, cyanosis, or edema  NEURO/PSYCH: AAOx3, nonfocal  SKIN: No rashes or lesions      LABS:                        12.1   9.87  )-----------( 264      ( 03 Aug 2020 07:26 )             35.6     Hemoglobin: 12.1 g/dL (08-03 @ 07:26)  Hemoglobin: 11.7 g/dL (08-02 @ 06:30)  Hemoglobin: 12.1 g/dL (08-01 @ 06:54)  Hemoglobin: 12.8 g/dL (07-31 @ 15:31)  Hemoglobin: 13.5 g/dL (07-31 @ 12:10)    CBC Full  -  ( 03 Aug 2020 07:26 )  WBC Count : 9.87 K/uL  RBC Count : 3.71 M/uL  Hemoglobin : 12.1 g/dL  Hematocrit : 35.6 %  Platelet Count - Automated : 264 K/uL  Mean Cell Volume : 96.0 fL  Mean Cell Hemoglobin : 32.6 pg  Mean Cell Hemoglobin Concentration : 34.0 %  Auto Neutrophil # : x  Auto Lymphocyte # : x  Auto Monocyte # : x  Auto Eosinophil # : x  Auto Basophil # : x  Auto Neutrophil % : x  Auto Lymphocyte % : x  Auto Monocyte % : x  Auto Eosinophil % : x  Auto Basophil % : x    08-03    136  |  100  |  5<L>  ----------------------------<  109<H>  3.6   |  24  |  0.52    Ca    8.9      03 Aug 2020 07:26  Phos  2.8     08-03  Mg     1.8     08-03      Creatinine Trend: 0.52<--, 0.64<--, 0.62<--, 0.62<--, 0.58<--, 0.68<--        hs Troponin:              CSF:                      EKG:   MICROBIOLOGY:    Culture - Stool (collected 01 Aug 2020 16:31)  Source: .Stool Feces  Final Report (03 Aug 2020 19:15):    No enteric pathogens isolated.    (Stool culture examined for Salmonella,    Shigella, Campylobacter, Aeromonas, Plesiomonas,    Vibrio, E.coli O157 and Yersinia)    GI PCR Panel, Stool (collected 01 Aug 2020 16:31)  Source: .Stool Feces  Final Report (01 Aug 2020 21:10):    Enteroaggregative E. coli (EAEC)    DETECTED by PCR    *******Please Note:*******    GI panel PCR evaluates for:    Campylobacter, Plesiomonas shigelloides, Salmonella,    Vibrio, Yersinia enterocolitica, Enteroaggregative    Escherichia coli (EAEC), Enteropathogenic E.coli (EPEC),    Enterotoxigenic E. coli (ETEC) lt/st, Shiga-like    toxin-producing E. coli (STEC) stx1/stx2,    Shigella/ Enteroinvasive E. coli (EIEC), Cryptosporidium,    Cyclospora cayetanensis, Entamoeba histolytica,    Giardia lamblia, Adenovirus F 40/41, Astrovirus,    Norovirus GI/GII, Rotavirus A, Sapovirus      IMAGING:      Labs, imaging, EKG personally reviewed Internal Medicine Progress Note    =======================================================  CONTACT ALISA Rutherford M.D., PGY-1  Pager:  [phone] ([facility])  =======================================================    Patient is a 79y old  Female who presents with a chief complaint of Syncope (03 Aug 2020 15:56)      SUBJECTIVE / OVERNIGHT EVENTS:  NAEO.    This am, pt continues to c/o RLQ pain, but with moderate improvement (6/10 yesterday --> 4/10 today). No n/v/d. Still endorses occasional dizziness when ambulating.   MEDICATIONS  (STANDING):  amLODIPine   Tablet 10 milliGRAM(s) Oral daily  ciprofloxacin   IVPB 400 milliGRAM(s) IV Intermittent every 12 hours  clonazePAM Oral Disintegrating Tablet 0.25 milliGRAM(s) Oral at bedtime  dextrose 5% + sodium chloride 0.45% 1000 milliLiter(s) (75 mL/Hr) IV Continuous <Continuous>  escitalopram 10 milliGRAM(s) Oral daily  famotidine    Tablet 20 milliGRAM(s) Oral two times a day  heparin   Injectable 5000 Unit(s) SubCutaneous every 12 hours  lactobacillus acidophilus 1 Tablet(s) Oral two times a day  levothyroxine 75 MICROGram(s) Oral daily  metroNIDAZOLE  IVPB      metroNIDAZOLE  IVPB 500 milliGRAM(s) IV Intermittent every 8 hours  pregabalin 25 milliGRAM(s) Oral two times a day  QUEtiapine 100 milliGRAM(s) Oral at bedtime    MEDICATIONS  (PRN):  acetaminophen   Tablet .. 650 milliGRAM(s) Oral every 6 hours PRN Temp greater or equal to 38C (100.4F), Moderate Pain (4 - 6), Severe Pain (7 - 10)  LORazepam     Tablet 1 milliGRAM(s) Oral two times a day PRN Anxiety    Vital Signs Last 24 Hrs  T(C): 38.3 (04 Aug 2020 05:17), Max: 38.3 (04 Aug 2020 05:17)  T(F): 100.9 (04 Aug 2020 05:17), Max: 100.9 (04 Aug 2020 05:17)  HR: 61 (04 Aug 2020 05:17) (60 - 74)  BP: 148/58 (04 Aug 2020 05:17) (130/64 - 148/58)  BP(mean): --  RR: 17 (04 Aug 2020 05:17) (17 - 18)  SpO2: 99% (04 Aug 2020 05:17) (97% - 100%)    CAPILLARY BLOOD GLUCOSE        I&O's Summary    02 Aug 2020 07:01  -  03 Aug 2020 07:00  --------------------------------------------------------  IN: 1930 mL / OUT: 1000 mL / NET: 930 mL    03 Aug 2020 07:01  -  04 Aug 2020 06:28  --------------------------------------------------------  IN: 2400 mL / OUT: 1200 mL / NET: 1200 mL        PHYSICAL EXAM  GENERAL: NAD  HEAD:  Atraumatic  EYES: EOMI, PERRLA, conjunctiva and sclera clear  NECK: Supple, No JVD  CHEST/LUNG: Clear to auscultation bilaterally; No wheeze  HEART: Regular rate and rhythm; No murmurs, rubs, or gallops  ABDOMEN: BS present. Soft, RLQ TTP, Nondistended  EXTREMITIES:  2+ Peripheral Pulses, No clubbing, cyanosis, or edema  NEURO/PSYCH: AAOx3, nonfocal  SKIN: No rashes or lesions      LABS:                        12.1   9.87  )-----------( 264      ( 03 Aug 2020 07:26 )             35.6     Hemoglobin: 12.1 g/dL (08-03 @ 07:26)  Hemoglobin: 11.7 g/dL (08-02 @ 06:30)  Hemoglobin: 12.1 g/dL (08-01 @ 06:54)  Hemoglobin: 12.8 g/dL (07-31 @ 15:31)  Hemoglobin: 13.5 g/dL (07-31 @ 12:10)    CBC Full  -  ( 03 Aug 2020 07:26 )  WBC Count : 9.87 K/uL  RBC Count : 3.71 M/uL  Hemoglobin : 12.1 g/dL  Hematocrit : 35.6 %  Platelet Count - Automated : 264 K/uL  Mean Cell Volume : 96.0 fL  Mean Cell Hemoglobin : 32.6 pg  Mean Cell Hemoglobin Concentration : 34.0 %  Auto Neutrophil # : x  Auto Lymphocyte # : x  Auto Monocyte # : x  Auto Eosinophil # : x  Auto Basophil # : x  Auto Neutrophil % : x  Auto Lymphocyte % : x  Auto Monocyte % : x  Auto Eosinophil % : x  Auto Basophil % : x    08-03    136  |  100  |  5<L>  ----------------------------<  109<H>  3.6   |  24  |  0.52    Ca    8.9      03 Aug 2020 07:26  Phos  2.8     08-03  Mg     1.8     08-03      Creatinine Trend: 0.52<--, 0.64<--, 0.62<--, 0.62<--, 0.58<--, 0.68<--        hs Troponin:              CSF:                      EKG:   MICROBIOLOGY:    Culture - Stool (collected 01 Aug 2020 16:31)  Source: .Stool Feces  Final Report (03 Aug 2020 19:15):    No enteric pathogens isolated.    (Stool culture examined for Salmonella,    Shigella, Campylobacter, Aeromonas, Plesiomonas,    Vibrio, E.coli O157 and Yersinia)    GI PCR Panel, Stool (collected 01 Aug 2020 16:31)  Source: .Stool Feces  Final Report (01 Aug 2020 21:10):    Enteroaggregative E. coli (EAEC)    DETECTED by PCR    *******Please Note:*******    GI panel PCR evaluates for:    Campylobacter, Plesiomonas shigelloides, Salmonella,    Vibrio, Yersinia enterocolitica, Enteroaggregative    Escherichia coli (EAEC), Enteropathogenic E.coli (EPEC),    Enterotoxigenic E. coli (ETEC) lt/st, Shiga-like    toxin-producing E. coli (STEC) stx1/stx2,    Shigella/ Enteroinvasive E. coli (EIEC), Cryptosporidium,    Cyclospora cayetanensis, Entamoeba histolytica,    Giardia lamblia, Adenovirus F 40/41, Astrovirus,    Norovirus GI/GII, Rotavirus A, Sapovirus      IMAGING:      Labs, imaging, EKG personally reviewed

## 2020-08-04 NOTE — PROGRESS NOTE ADULT - PROBLEM SELECTOR PLAN 2
orthostatic hypotension  -likely orthostatic due to long standing problem of dizziness after getting out of bed per pt  -orthostatic vitals are positive, >20mmHg change from sitting to standing  -Pt not on tele, serial EKGs performed, no abnormalities. Serial EKG and QTc checks, WNL for pt baseline  -This am, pt endorsed some lightheadedness when ambulating to the bathroom. Orthostatics neg orthostatic hypotension  -likely orthostatic due to long standing problem of dizziness after getting out of bed per pt  -orthostatic vitals initially positive, >20mmHg change from sitting to standing  -Pt not on tele, serial EKGs performed, no abnormalities. Serial EKG and QTc checks, WNL for pt baseline  -This am, pt endorsed some lightheadedness when ambulating to the bathroom. Orthostatics neg orthostatic hypotension  -likely orthostatic due to long standing problem of dizziness after getting out of bed per pt  -orthostatic vitals initially positive, >20mmHg change from sitting to standing  -Pt not on tele, serial EKGs performed, no abnormalities. Serial EKG and QTc checks, WNL for pt baseline

## 2020-08-04 NOTE — PROGRESS NOTE ADULT - PROBLEM SELECTOR PLAN 6
Diet: reg diet  DVT ppx: hep sub q  -Hold bowel prophylaxis due to possible enterocolitis or other GI infection Diet: reg diet, NPO at midnight for ECT [facility]  DVT ppx: hep sub q  -Hold bowel prophylaxis due to possible enterocolitis or other GI infection
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
## 2020-08-04 NOTE — PROGRESS NOTE ADULT - ASSESSMENT
80yo F with PMH of HTN and hx bipolar disorder with psychotic features presents with syncope and abdominal pain, likely due to orthostatic hypotension and sigmoid diverticulitis found on abd CT, stool culture positive for e. coli.

## 2020-08-04 NOTE — PROGRESS NOTE ADULT - PROBLEM SELECTOR PLAN 3
Pt has bipolar disorder with psychotic features, depression, SI/HI multiple [facility] hospitalizations  -cont psychiatry medications: quetiapine, escitalopram, pregabalin  -Per psych recs, pt no longer needs 1:1   -Pt was supposed to have ECT treatment 8/3, appt will be cancelled until medical condition is managed. Psych following, will plan for ECT on 8/5 if pt still admitted at [facility] and medically clear. Pt has bipolar disorder with psychotic features, depression, SI/HI multiple [facility] hospitalizations  -cont psychiatry medications: quetiapine, escitalopram, pregabalin  -Per psych recs, pt no longer needs 1:1   -Pt was supposed to have ECT treatment 8/3, appt will be cancelled until medical condition is managed. Psych following, will plan for ECT on 8/5 if pt still admitted at [facility] and medically clear.   -Anesthesia clearance in chart Pt has bipolar disorder with psychotic features, depression, SI/HI multiple [facility] hospitalizations  -cont psychiatry medications: quetiapine, escitalopram, pregabalin  -Per psych recs, pt no longer needs 1:1   -Pt was supposed to have ECT treatment 8/3, appt postponed until medical condition is managed. Psych following, will plan for ECT on 8/5 at [facility]  -Anesthesia clearance in chart Pt has bipolar disorder with psychotic features, depression, SI/HI multiple [facility] hospitalizations  -cont psychiatry medications: quetiapine, escitalopram, pregabalin  -Per psych recs, pt no longer needs 1:1   -Pt was supposed to have ECT treatment 8/3, appt postponed until medical condition is managed. Psych following, will plan for ECT on 8/5 at [facility]  -Anesthesia clearance in chart, pt received mouth guard from [facility]

## 2020-08-05 LAB
ANION GAP SERPL CALC-SCNC: 13 MMO/L — SIGNIFICANT CHANGE UP (ref 7–14)
BUN SERPL-MCNC: 4 MG/DL — LOW (ref 7–23)
CALCIUM SERPL-MCNC: 9 MG/DL — SIGNIFICANT CHANGE UP (ref 8.4–10.5)
CHLORIDE SERPL-SCNC: 98 MMOL/L — SIGNIFICANT CHANGE UP (ref 98–107)
CO2 SERPL-SCNC: 25 MMOL/L — SIGNIFICANT CHANGE UP (ref 22–31)
CREAT SERPL-MCNC: 0.55 MG/DL — SIGNIFICANT CHANGE UP (ref 0.5–1.3)
CULTURE RESULTS: SIGNIFICANT CHANGE UP
CULTURE RESULTS: SIGNIFICANT CHANGE UP
GLUCOSE SERPL-MCNC: 103 MG/DL — HIGH (ref 70–99)
HCT VFR BLD CALC: 26.7 % — LOW (ref 34.5–45)
HCT VFR BLD CALC: 37.2 % — SIGNIFICANT CHANGE UP (ref 34.5–45)
HGB BLD-MCNC: 11.9 G/DL — SIGNIFICANT CHANGE UP (ref 11.5–15.5)
HGB BLD-MCNC: 8.3 G/DL — LOW (ref 11.5–15.5)
MAGNESIUM SERPL-MCNC: 1.8 MG/DL — SIGNIFICANT CHANGE UP (ref 1.6–2.6)
MCHC RBC-ENTMCNC: 31.1 % — LOW (ref 32–36)
MCHC RBC-ENTMCNC: 31.2 PG — SIGNIFICANT CHANGE UP (ref 27–34)
MCHC RBC-ENTMCNC: 32 % — SIGNIFICANT CHANGE UP (ref 32–36)
MCHC RBC-ENTMCNC: 32.3 PG — SIGNIFICANT CHANGE UP (ref 27–34)
MCV RBC AUTO: 103.9 FL — HIGH (ref 80–100)
MCV RBC AUTO: 97.6 FL — SIGNIFICANT CHANGE UP (ref 80–100)
NRBC # FLD: 0 K/UL — SIGNIFICANT CHANGE UP (ref 0–0)
NRBC # FLD: 0.02 K/UL — SIGNIFICANT CHANGE UP (ref 0–0)
PHOSPHATE SERPL-MCNC: 2.5 MG/DL — SIGNIFICANT CHANGE UP (ref 2.5–4.5)
PLATELET # BLD AUTO: 204 K/UL — SIGNIFICANT CHANGE UP (ref 150–400)
PLATELET # BLD AUTO: 289 K/UL — SIGNIFICANT CHANGE UP (ref 150–400)
PMV BLD: 11.6 FL — SIGNIFICANT CHANGE UP (ref 7–13)
PMV BLD: 9.8 FL — SIGNIFICANT CHANGE UP (ref 7–13)
POTASSIUM SERPL-MCNC: 3.6 MMOL/L — SIGNIFICANT CHANGE UP (ref 3.5–5.3)
POTASSIUM SERPL-SCNC: 3.6 MMOL/L — SIGNIFICANT CHANGE UP (ref 3.5–5.3)
RBC # BLD: 2.57 M/UL — LOW (ref 3.8–5.2)
RBC # BLD: 3.81 M/UL — SIGNIFICANT CHANGE UP (ref 3.8–5.2)
RBC # FLD: 12 % — SIGNIFICANT CHANGE UP (ref 10.3–14.5)
RBC # FLD: 13 % — SIGNIFICANT CHANGE UP (ref 10.3–14.5)
RETICS #: 30 K/UL — SIGNIFICANT CHANGE UP (ref 25–125)
RETICS/RBC NFR: 1.2 % — SIGNIFICANT CHANGE UP (ref 0.5–2.5)
SODIUM SERPL-SCNC: 136 MMOL/L — SIGNIFICANT CHANGE UP (ref 135–145)
SPECIMEN SOURCE: SIGNIFICANT CHANGE UP
SPECIMEN SOURCE: SIGNIFICANT CHANGE UP
WBC # BLD: 11.48 K/UL — HIGH (ref 3.8–10.5)
WBC # BLD: 5.23 K/UL — SIGNIFICANT CHANGE UP (ref 3.8–10.5)
WBC # FLD AUTO: 11.48 K/UL — HIGH (ref 3.8–10.5)
WBC # FLD AUTO: 5.23 K/UL — SIGNIFICANT CHANGE UP (ref 3.8–10.5)

## 2020-08-05 PROCEDURE — 99233 SBSQ HOSP IP/OBS HIGH 50: CPT

## 2020-08-05 PROCEDURE — 99233 SBSQ HOSP IP/OBS HIGH 50: CPT | Mod: GC

## 2020-08-05 PROCEDURE — 99222 1ST HOSP IP/OBS MODERATE 55: CPT

## 2020-08-05 RX ADMIN — Medication 100 MILLIGRAM(S): at 21:33

## 2020-08-05 RX ADMIN — FAMOTIDINE 20 MILLIGRAM(S): 10 INJECTION INTRAVENOUS at 18:03

## 2020-08-05 RX ADMIN — ESCITALOPRAM OXALATE 10 MILLIGRAM(S): 10 TABLET, FILM COATED ORAL at 12:31

## 2020-08-05 RX ADMIN — Medication 100 MILLIGRAM(S): at 05:36

## 2020-08-05 RX ADMIN — FAMOTIDINE 20 MILLIGRAM(S): 10 INJECTION INTRAVENOUS at 05:10

## 2020-08-05 RX ADMIN — QUETIAPINE FUMARATE 100 MILLIGRAM(S): 200 TABLET, FILM COATED ORAL at 21:48

## 2020-08-05 RX ADMIN — Medication 75 MICROGRAM(S): at 05:12

## 2020-08-05 RX ADMIN — HEPARIN SODIUM 5000 UNIT(S): 5000 INJECTION INTRAVENOUS; SUBCUTANEOUS at 18:04

## 2020-08-05 RX ADMIN — AMLODIPINE BESYLATE 10 MILLIGRAM(S): 2.5 TABLET ORAL at 05:10

## 2020-08-05 RX ADMIN — Medication 1 TABLET(S): at 18:04

## 2020-08-05 RX ADMIN — Medication 650 MILLIGRAM(S): at 15:18

## 2020-08-05 RX ADMIN — Medication 100 MILLIGRAM(S): at 14:10

## 2020-08-05 RX ADMIN — HEPARIN SODIUM 5000 UNIT(S): 5000 INJECTION INTRAVENOUS; SUBCUTANEOUS at 05:12

## 2020-08-05 RX ADMIN — Medication 1 TABLET(S): at 05:11

## 2020-08-05 RX ADMIN — Medication 200 MILLIGRAM(S): at 18:02

## 2020-08-05 RX ADMIN — Medication 200 MILLIGRAM(S): at 05:36

## 2020-08-05 RX ADMIN — Medication 650 MILLIGRAM(S): at 15:31

## 2020-08-05 RX ADMIN — Medication 25 MILLIGRAM(S): at 18:04

## 2020-08-05 RX ADMIN — Medication 25 MILLIGRAM(S): at 05:12

## 2020-08-05 NOTE — PROGRESS NOTE ADULT - PROBLEM SELECTOR PLAN 3
Pt has bipolar disorder with psychotic features, depression, SI/HI multiple [facility] hospitalizations  -cont psychiatry medications: quetiapine, escitalopram, pregabalin  -Per psych recs, pt no longer needs 1:1   -Pt was supposed to have ECT treatment 8/3, appt postponed until medical condition is managed. Psych following, will plan for ECT on 8/5 at [facility]  -Anesthesia clearance in chart, pt received mouth guard from [facility] Pt has bipolar disorder with psychotic features, depression, SI/HI multiple [facility] hospitalizations  -cont psychiatry medications: quetiapine, escitalopram, pregabalin  -Per psych recs, pt no longer needs 1:1   -Pt was supposed to have ECT treatment 8/3, appt postponed until medical condition is managed. Psych following, ECT on 8/5 at [facility]  -Anesthesia clearance in chart, pt received mouth guard from [facility] Pt has bipolar disorder with psychotic features, depression, SI/HI multiple [facility] hospitalizations  -cont psychiatry medications: quetiapine, escitalopram, pregabalin  -Per psych recs, pt no longer needs 1:1   -Pt was supposed to have ECT treatment 8/3, appt postponed until medical condition is managed. Psych following, ECT done on 8/5 at [facility]

## 2020-08-05 NOTE — PROGRESS NOTE ADULT - PROBLEM SELECTOR PLAN 2
orthostatic hypotension  -likely orthostatic due to long standing problem of dizziness after getting out of bed per pt  -orthostatic vitals initially positive, >20mmHg change from sitting to standing  -Pt not on tele, serial EKGs performed, no abnormalities. Serial EKG and QTc checks, WNL for pt baseline

## 2020-08-05 NOTE — PROGRESS NOTE ADULT - PROBLEM SELECTOR PLAN 6
Diet: reg diet, NPO at midnight for ECT rhonda  DVT ppx: hep sub q  -Hold bowel prophylaxis due to possible enterocolitis or other GI infection Diet: NPO for ECT, will resume regular diet after  DVT ppx: hep sub q  -Hold bowel prophylaxis due to possible enterocolitis or other GI infection Diet: Reg diet  DVT ppx: hep sub q
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
## 2020-08-05 NOTE — PROGRESS NOTE ADULT - PROBLEM SELECTOR PLAN 1
- abd tenderness with diarrhea, now improving  - stool GI PCR positive for EAEC (e.coli)  - CT abd pelvis with contrast showed sigmoid diverticulitis  - c/w lactobacillus for GI proph  -d/c Zosyn. Now on Cipro/Flagyl (started on 8/2)   -tolerating reg diet  -IV fluids D5 1/2 normal saline with 20K; if tolerates PO can d/c  -Given continued abd pain, will start 15 mg toradol IV q6, pepcid 20 mg PO  -temp 100.9 this am (8/4), will repeat cx, continue trending - abd tenderness with diarrhea, now improving  - stool GI PCR positive for EAEC (e.coli)  - CT abd pelvis with contrast showed sigmoid diverticulitis  - c/w lactobacillus for GI proph  -d/c Zosyn. Now on Cipro/Flagyl (started on 8/2)   -tolerating reg diet  -IV fluids D5 1/2 normal saline with 20K; if tolerates PO can d/c  -Given continued abd pain, will start 15 mg toradol IV q6, pepcid 20 mg PO  -temp 100.8 this am (8/5), will repeat cx, continue trending. Received Tylenol with good effect. - abd tenderness with diarrhea, now improving  - stool GI PCR positive for EAEC (e.coli)  - CT abd pelvis with contrast showed sigmoid diverticulitis  - c/w lactobacillus for GI proph  -d/c Zosyn. Now on Cipro/Flagyl (started on 8/2)   -tolerating reg diet  -Given continued abd pain, will start 15 mg toradol IV q6, pepcid 20 mg PO  -temp 100.8 this am (8/5), will repeat cx, continue trending. Received Tylenol with good effect. Leukocytosis.   -ID consulted given recurrent fevers. Rec'd continued abx treatment and repeat CT abd/pelv tomorrow if fevers, abdominal pain persists, appreciate recs  -D/c fluids

## 2020-08-05 NOTE — CONSULT NOTE ADULT - ASSESSMENT
79 year old female with HTN, Bipolar Disorder s/p ECT presenting with syncope, found to have sigmoid diverticulitis.     Leukocytosis resolved  Fevers resolved, but with fevers yesterday  Received ECT this morning  Remains with RLQ pain  Initial blood cultures neg, repeat blood cultures sent    Recommend:  #Sepsis secondary to sigmoid diverticulitis  -Continue cipro/ flagyl  -F/U repeat blood cultures  -If continues to have fevers or if RLQ pain persists, repeat CT A/P to r/o developing abscess    #Fevers  -Monitor fever curve  -Continue abx  -F/U blood cxs    #Bipolar Disorder  -s/p ECT this morning    #Leukocytosis  -Resolved  -Continue to trend    Constantin Llanes MD  Pager [phone]  After 5pm/weekends call [phone]    Discussed plan with primary team.

## 2020-08-05 NOTE — PROGRESS NOTE ADULT - ATTENDING COMMENTS
Patient seen and examined, case d/w house staff.  79F with PMH HTN, bipolar d/o on ECT at [facility], p/w syncope, lower abdominal pain, found to have acute sigmoid diverticulitis, started on zosyn.   No SI/HI, off CO.      Assessment/plan:  # acute sigmoid diverticulitis, infectious diarrhea:   still febrile 100.8F last night, ID consulted, if cont to be febrile or RLQ pain then repeat CT abd/pelvis to r/o abscess  c/w cipro/flagyl,  stool PCR enteroaggregative e. coli  pain control, lactobacillus, GI ppx pepcid  encourage regular diet, pt with poor intake, d/c IVF  # bipolar d/o, h/o SI/HI: no active SI/HI, had ECT today,  cont psych meds (lexapro, Klonopin, seroquel, ativan prn), monitor QTc on EKG to ensure QTc<500 ms  # syncope, +orthostasis: resolved, d/c IVF, encourage po intake, monitor BP  # covid antibody +, PCR neg: recent exposure, no acute infection, satting well on RA  # DVT ppx  # dispo: d/c to [facility] when afebrile and infection controlled.

## 2020-08-05 NOTE — PROGRESS NOTE BEHAVIORAL HEALTH - SUMMARY
79 year-old  female, with history of Bipolar with psychotic features, Anxiety, Depression, with significant medication resistance, with prior multiple in-patient hospitalizations (~7 around age 32's) with positive responses to ECT and stable for 40 years; and 2 recent admissions this year (2020) between 2/2020 - 5/1/2020 with precipitant being a fall 3/2020 leading to being on medication and triggered psychiatric decompensation, with prior suicidal ideation and suicide attempt via cutting wrists (needing sutures), admitted to [facility] 7/15/20, for SI and HI in context of bipolar depression w/o psychosis, received 5x ECT treatments, next treatment was scheduled for 8/3, transferred from [facility] to [facility] on 7/31 for fever, found to be septic and admitted for workup w/ suspected GI infection.    Patient had ECT today. Continues to have fevers and abdominal pain. Is feeling less depressed and wants to resume ECT. No change in plan    PLAN:   - Lexapro 10 mg daily, seroquel 100mg qhs, ativan 1mg BID, klonopin 0.25mg qhs, lyrica 25mg BID.  - Can consider haldol 2mg q6h PO/IM/IV PRN for agitation  - will return to [facility] once medically cleared, plan is for patient to [facility] voluntary legals

## 2020-08-05 NOTE — PROGRESS NOTE ADULT - SUBJECTIVE AND OBJECTIVE BOX
Internal Medicine Progress Note    =======================================================  CONTACT ALISA Rutherford M.D., PGY-1  Pager:  [phone] ([facility])  =======================================================    Patient is a 79y old  Female who presents with a chief complaint of Syncope (04 Aug 2020 06:28)      SUBJECTIVE / OVERNIGHT EVENTS:  Overnight, pt had a fever of 100.8 and received Tylenol. HR 45.     This am,    MEDICATIONS  (STANDING):  amLODIPine   Tablet 10 milliGRAM(s) Oral daily  ciprofloxacin   IVPB 400 milliGRAM(s) IV Intermittent every 12 hours  clonazePAM Oral Disintegrating Tablet 0.25 milliGRAM(s) Oral at bedtime  dextrose 5% + sodium chloride 0.45% 1000 milliLiter(s) (75 mL/Hr) IV Continuous <Continuous>  escitalopram 10 milliGRAM(s) Oral daily  famotidine    Tablet 20 milliGRAM(s) Oral two times a day  heparin   Injectable 5000 Unit(s) SubCutaneous every 12 hours  lactobacillus acidophilus 1 Tablet(s) Oral two times a day  levothyroxine 75 MICROGram(s) Oral daily  metroNIDAZOLE  IVPB 500 milliGRAM(s) IV Intermittent every 8 hours  metroNIDAZOLE  IVPB      pregabalin 25 milliGRAM(s) Oral two times a day  QUEtiapine 100 milliGRAM(s) Oral at bedtime    MEDICATIONS  (PRN):  acetaminophen   Tablet .. 650 milliGRAM(s) Oral every 6 hours PRN Temp greater or equal to 38C (100.4F), Moderate Pain (4 - 6), Severe Pain (7 - 10)  LORazepam     Tablet 1 milliGRAM(s) Oral two times a day PRN Anxiety    Vital Signs Last 24 Hrs  T(C): 36.7 (05 Aug 2020 05:06), Max: 38.2 (04 Aug 2020 21:12)  T(F): 98.1 (05 Aug 2020 05:06), Max: 100.8 (04 Aug 2020 21:12)  HR: 45 (05 Aug 2020 05:06) (45 - 76)  BP: 145/58 (05 Aug 2020 05:06) (113/51 - 145/58)  BP(mean): --  RR: 16 (05 Aug 2020 05:06) (16 - 18)  SpO2: 100% (05 Aug 2020 05:06) (97% - 100%)    CAPILLARY BLOOD GLUCOSE        I&O's Summary    03 Aug 2020 07:01  -  04 Aug 2020 07:00  --------------------------------------------------------  IN: 2400 mL / OUT: 1200 mL / NET: 1200 mL        PHYSICAL EXAM  GENERAL: NAD  HEAD:  Atraumatic  EYES: EOMI, PERRLA, conjunctiva and sclera clear  NECK: Supple, No JVD  CHEST/LUNG: Clear to auscultation bilaterally; No wheeze  HEART: Regular rate and rhythm; No murmurs, rubs, or gallops  ABDOMEN: Soft, Nontender, Nondistended; Bowel sounds present  EXTREMITIES:  2+ Peripheral Pulses, No clubbing, cyanosis, or edema  NEURO/PSYCH: AAOx3, nonfocal  SKIN: No rashes or lesions      LABS:                        11.2   8.72  )-----------( 272      ( 04 Aug 2020 06:25 )             35.1     Hemoglobin: 11.2 g/dL (08-04 @ 06:25)  Hemoglobin: 12.1 g/dL (08-03 @ 07:26)  Hemoglobin: 11.7 g/dL (08-02 @ 06:30)  Hemoglobin: 12.1 g/dL (08-01 @ 06:54)  Hemoglobin: 12.8 g/dL (07-31 @ 15:31)    CBC Full  -  ( 04 Aug 2020 06:25 )  WBC Count : 8.72 K/uL  RBC Count : 3.64 M/uL  Hemoglobin : 11.2 g/dL  Hematocrit : 35.1 %  Platelet Count - Automated : 272 K/uL  Mean Cell Volume : 96.4 fL  Mean Cell Hemoglobin : 30.8 pg  Mean Cell Hemoglobin Concentration : 31.9 %  Auto Neutrophil # : x  Auto Lymphocyte # : x  Auto Monocyte # : x  Auto Eosinophil # : x  Auto Basophil # : x  Auto Neutrophil % : x  Auto Lymphocyte % : x  Auto Monocyte % : x  Auto Eosinophil % : x  Auto Basophil % : x    08-04    135  |  101  |  5<L>  ----------------------------<  111<H>  3.8   |  23  |  0.60    Ca    8.0<L>      04 Aug 2020 06:25  Phos  2.6     08-04  Mg     1.5     08-04      Creatinine Trend: 0.60<--, 0.52<--, 0.64<--, 0.62<--, 0.62<--, 0.58<--        hs Troponin:              CSF:                      EKG:   MICROBIOLOGY:    IMAGING:      Labs, imaging, EKG personally reviewed Internal Medicine Progress Note    =======================================================  CONTACT ALISA Rutherford M.D., PGY-1  Pager:  [phone] ([facility])  =======================================================    Patient is a 79y old  Female who presents with a chief complaint of Syncope (04 Aug 2020 06:28)      SUBJECTIVE / OVERNIGHT EVENTS:  Overnight, pt had a fever of 100.8 and received Tylenol. HR 45.     This am, pt was at ECT.     MEDICATIONS  (STANDING):  amLODIPine   Tablet 10 milliGRAM(s) Oral daily  ciprofloxacin   IVPB 400 milliGRAM(s) IV Intermittent every 12 hours  clonazePAM Oral Disintegrating Tablet 0.25 milliGRAM(s) Oral at bedtime  dextrose 5% + sodium chloride 0.45% 1000 milliLiter(s) (75 mL/Hr) IV Continuous <Continuous>  escitalopram 10 milliGRAM(s) Oral daily  famotidine    Tablet 20 milliGRAM(s) Oral two times a day  heparin   Injectable 5000 Unit(s) SubCutaneous every 12 hours  lactobacillus acidophilus 1 Tablet(s) Oral two times a day  levothyroxine 75 MICROGram(s) Oral daily  metroNIDAZOLE  IVPB 500 milliGRAM(s) IV Intermittent every 8 hours  metroNIDAZOLE  IVPB      pregabalin 25 milliGRAM(s) Oral two times a day  QUEtiapine 100 milliGRAM(s) Oral at bedtime    MEDICATIONS  (PRN):  acetaminophen   Tablet .. 650 milliGRAM(s) Oral every 6 hours PRN Temp greater or equal to 38C (100.4F), Moderate Pain (4 - 6), Severe Pain (7 - 10)  LORazepam     Tablet 1 milliGRAM(s) Oral two times a day PRN Anxiety    Vital Signs Last 24 Hrs  T(C): 36.7 (05 Aug 2020 05:06), Max: 38.2 (04 Aug 2020 21:12)  T(F): 98.1 (05 Aug 2020 05:06), Max: 100.8 (04 Aug 2020 21:12)  HR: 45 (05 Aug 2020 05:06) (45 - 76)  BP: 145/58 (05 Aug 2020 05:06) (113/51 - 145/58)  BP(mean): --  RR: 16 (05 Aug 2020 05:06) (16 - 18)  SpO2: 100% (05 Aug 2020 05:06) (97% - 100%)    CAPILLARY BLOOD GLUCOSE        I&O's Summary    03 Aug 2020 07:01  -  04 Aug 2020 07:00  --------------------------------------------------------  IN: 2400 mL / OUT: 1200 mL / NET: 1200 mL        PHYSICAL EXAM  GENERAL: NAD  HEAD:  Atraumatic  EYES: EOMI, PERRLA, conjunctiva and sclera clear  NECK: Supple, No JVD  CHEST/LUNG: Clear to auscultation bilaterally; No wheeze  HEART: Regular rate and rhythm; No murmurs, rubs, or gallops  ABDOMEN: Soft, Nontender, Nondistended; Bowel sounds present  EXTREMITIES:  2+ Peripheral Pulses, No clubbing, cyanosis, or edema  NEURO/PSYCH: AAOx3, nonfocal  SKIN: No rashes or lesions      LABS:                        11.2   8.72  )-----------( 272      ( 04 Aug 2020 06:25 )             35.1     Hemoglobin: 11.2 g/dL (08-04 @ 06:25)  Hemoglobin: 12.1 g/dL (08-03 @ 07:26)  Hemoglobin: 11.7 g/dL (08-02 @ 06:30)  Hemoglobin: 12.1 g/dL (08-01 @ 06:54)  Hemoglobin: 12.8 g/dL (07-31 @ 15:31)    CBC Full  -  ( 04 Aug 2020 06:25 )  WBC Count : 8.72 K/uL  RBC Count : 3.64 M/uL  Hemoglobin : 11.2 g/dL  Hematocrit : 35.1 %  Platelet Count - Automated : 272 K/uL  Mean Cell Volume : 96.4 fL  Mean Cell Hemoglobin : 30.8 pg  Mean Cell Hemoglobin Concentration : 31.9 %  Auto Neutrophil # : x  Auto Lymphocyte # : x  Auto Monocyte # : x  Auto Eosinophil # : x  Auto Basophil # : x  Auto Neutrophil % : x  Auto Lymphocyte % : x  Auto Monocyte % : x  Auto Eosinophil % : x  Auto Basophil % : x    08-04    135  |  101  |  5<L>  ----------------------------<  111<H>  3.8   |  23  |  0.60    Ca    8.0<L>      04 Aug 2020 06:25  Phos  2.6     08-04  Mg     1.5     08-04      Creatinine Trend: 0.60<--, 0.52<--, 0.64<--, 0.62<--, 0.62<--, 0.58<--        hs Troponin:              CSF:                      EKG:   MICROBIOLOGY:    IMAGING:      Labs, imaging, EKG personally reviewed Internal Medicine Progress Note    =======================================================  CONTACT ALISA Rutherford M.D., PGY-1  Pager:  [phone] ([facility])  =======================================================    Patient is a 79y old  Female who presents with a chief complaint of Syncope (04 Aug 2020 06:28)      SUBJECTIVE / OVERNIGHT EVENTS:  Overnight, pt had a fever of 100.8 and received Tylenol. HR 45.     This am, pt continued to complain of moderate RLQ abdominal pain. Went to ECT prior to interview, notes some "fogginess" which is typical for her prior treatments. Pt denies any SOB, CP. Endorses mild dizziness with ambulation.    MEDICATIONS  (STANDING):  amLODIPine   Tablet 10 milliGRAM(s) Oral daily  ciprofloxacin   IVPB 400 milliGRAM(s) IV Intermittent every 12 hours  clonazePAM Oral Disintegrating Tablet 0.25 milliGRAM(s) Oral at bedtime  dextrose 5% + sodium chloride 0.45% 1000 milliLiter(s) (75 mL/Hr) IV Continuous <Continuous>  escitalopram 10 milliGRAM(s) Oral daily  famotidine    Tablet 20 milliGRAM(s) Oral two times a day  heparin   Injectable 5000 Unit(s) SubCutaneous every 12 hours  lactobacillus acidophilus 1 Tablet(s) Oral two times a day  levothyroxine 75 MICROGram(s) Oral daily  metroNIDAZOLE  IVPB 500 milliGRAM(s) IV Intermittent every 8 hours  metroNIDAZOLE  IVPB      pregabalin 25 milliGRAM(s) Oral two times a day  QUEtiapine 100 milliGRAM(s) Oral at bedtime    MEDICATIONS  (PRN):  acetaminophen   Tablet .. 650 milliGRAM(s) Oral every 6 hours PRN Temp greater or equal to 38C (100.4F), Moderate Pain (4 - 6), Severe Pain (7 - 10)  LORazepam     Tablet 1 milliGRAM(s) Oral two times a day PRN Anxiety    Vital Signs Last 24 Hrs  T(C): 36.7 (05 Aug 2020 05:06), Max: 38.2 (04 Aug 2020 21:12)  T(F): 98.1 (05 Aug 2020 05:06), Max: 100.8 (04 Aug 2020 21:12)  HR: 45 (05 Aug 2020 05:06) (45 - 76)  BP: 145/58 (05 Aug 2020 05:06) (113/51 - 145/58)  BP(mean): --  RR: 16 (05 Aug 2020 05:06) (16 - 18)  SpO2: 100% (05 Aug 2020 05:06) (97% - 100%)    CAPILLARY BLOOD GLUCOSE        I&O's Summary    03 Aug 2020 07:01  -  04 Aug 2020 07:00  --------------------------------------------------------  IN: 2400 mL / OUT: 1200 mL / NET: 1200 mL        PHYSICAL EXAM  GENERAL: NAD  HEAD:  Atraumatic  EYES: EOMI, PERRLA, conjunctiva and sclera clear  NECK: Supple, No JVD  CHEST/LUNG: Clear to auscultation bilaterally; No wheeze  HEART: Regular rate and rhythm; No murmurs, rubs, or gallops  ABDOMEN: Bowel sounds present. Soft, RLQ TTP. Nondistended  EXTREMITIES:  2+ Peripheral Pulses, No clubbing, cyanosis, or edema  NEURO/PSYCH: AAOx3, nonfocal  SKIN: No rashes or lesions      LABS:                        11.2   8.72  )-----------( 272      ( 04 Aug 2020 06:25 )             35.1     Hemoglobin: 11.2 g/dL (08-04 @ 06:25)  Hemoglobin: 12.1 g/dL (08-03 @ 07:26)  Hemoglobin: 11.7 g/dL (08-02 @ 06:30)  Hemoglobin: 12.1 g/dL (08-01 @ 06:54)  Hemoglobin: 12.8 g/dL (07-31 @ 15:31)    CBC Full  -  ( 04 Aug 2020 06:25 )  WBC Count : 8.72 K/uL  RBC Count : 3.64 M/uL  Hemoglobin : 11.2 g/dL  Hematocrit : 35.1 %  Platelet Count - Automated : 272 K/uL  Mean Cell Volume : 96.4 fL  Mean Cell Hemoglobin : 30.8 pg  Mean Cell Hemoglobin Concentration : 31.9 %  Auto Neutrophil # : x  Auto Lymphocyte # : x  Auto Monocyte # : x  Auto Eosinophil # : x  Auto Basophil # : x  Auto Neutrophil % : x  Auto Lymphocyte % : x  Auto Monocyte % : x  Auto Eosinophil % : x  Auto Basophil % : x    08-04    135  |  101  |  5<L>  ----------------------------<  111<H>  3.8   |  23  |  0.60    Ca    8.0<L>      04 Aug 2020 06:25  Phos  2.6     08-04  Mg     1.5     08-04      Creatinine Trend: 0.60<--, 0.52<--, 0.64<--, 0.62<--, 0.62<--, 0.58<--        hs Troponin:              CSF:                      EKG:   MICROBIOLOGY:    IMAGING:      Labs, imaging, EKG personally reviewed

## 2020-08-06 ENCOUNTER — INPATIENT (INPATIENT)
Facility: HOSPITAL | Age: 80
LOS: 18 days | Discharge: HOME | End: 2020-08-25
Attending: PSYCHIATRY & NEUROLOGY | Admitting: PSYCHIATRY & NEUROLOGY
Payer: MEDICARE

## 2020-08-06 ENCOUNTER — TRANSCRIPTION ENCOUNTER (OUTPATIENT)
Age: 80
End: 2020-08-06

## 2020-08-06 VITALS
DIASTOLIC BLOOD PRESSURE: 51 MMHG | TEMPERATURE: 101 F | HEART RATE: 81 BPM | SYSTOLIC BLOOD PRESSURE: 121 MMHG | RESPIRATION RATE: 16 BRPM | OXYGEN SATURATION: 96 % | WEIGHT: 132.94 LBS

## 2020-08-06 VITALS — HEART RATE: 66 BPM

## 2020-08-06 DIAGNOSIS — Z90.5 ACQUIRED ABSENCE OF KIDNEY: Chronic | ICD-10-CM

## 2020-08-06 DIAGNOSIS — F31.4 BIPOLAR DISORDER, CURRENT EPISODE DEPRESSED, SEVERE, WITHOUT PSYCHOTIC FEATURES: ICD-10-CM

## 2020-08-06 DIAGNOSIS — Z98.891 HISTORY OF UTERINE SCAR FROM PREVIOUS SURGERY: Chronic | ICD-10-CM

## 2020-08-06 PROBLEM — I10 ESSENTIAL (PRIMARY) HYPERTENSION: Chronic | Status: ACTIVE | Noted: 2020-07-02

## 2020-08-06 PROBLEM — F99 MENTAL DISORDER, NOT OTHERWISE SPECIFIED: Chronic | Status: ACTIVE | Noted: 2020-07-31

## 2020-08-06 PROBLEM — R45.851 SUICIDAL IDEATIONS: Chronic | Status: ACTIVE | Noted: 2020-07-05

## 2020-08-06 PROBLEM — E03.9 HYPOTHYROIDISM, UNSPECIFIED: Chronic | Status: ACTIVE | Noted: 2020-07-31

## 2020-08-06 LAB
ANION GAP SERPL CALC-SCNC: 13 MMO/L — SIGNIFICANT CHANGE UP (ref 7–14)
BUN SERPL-MCNC: 8 MG/DL — SIGNIFICANT CHANGE UP (ref 7–23)
CALCIUM SERPL-MCNC: 9 MG/DL — SIGNIFICANT CHANGE UP (ref 8.4–10.5)
CHLORIDE SERPL-SCNC: 99 MMOL/L — SIGNIFICANT CHANGE UP (ref 98–107)
CO2 SERPL-SCNC: 23 MMOL/L — SIGNIFICANT CHANGE UP (ref 22–31)
CREAT SERPL-MCNC: 0.64 MG/DL — SIGNIFICANT CHANGE UP (ref 0.5–1.3)
GLUCOSE SERPL-MCNC: 95 MG/DL — SIGNIFICANT CHANGE UP (ref 70–99)
HCT VFR BLD CALC: 34.9 % — SIGNIFICANT CHANGE UP (ref 34.5–45)
HGB BLD-MCNC: 11.7 G/DL — SIGNIFICANT CHANGE UP (ref 11.5–15.5)
MAGNESIUM SERPL-MCNC: 1.8 MG/DL — SIGNIFICANT CHANGE UP (ref 1.6–2.6)
MCHC RBC-ENTMCNC: 31.9 PG — SIGNIFICANT CHANGE UP (ref 27–34)
MCHC RBC-ENTMCNC: 33.5 % — SIGNIFICANT CHANGE UP (ref 32–36)
MCV RBC AUTO: 95.1 FL — SIGNIFICANT CHANGE UP (ref 80–100)
NRBC # FLD: 0 K/UL — SIGNIFICANT CHANGE UP (ref 0–0)
PHOSPHATE SERPL-MCNC: 3.1 MG/DL — SIGNIFICANT CHANGE UP (ref 2.5–4.5)
PLATELET # BLD AUTO: 303 K/UL — SIGNIFICANT CHANGE UP (ref 150–400)
PMV BLD: 10.3 FL — SIGNIFICANT CHANGE UP (ref 7–13)
POTASSIUM SERPL-MCNC: 4 MMOL/L — SIGNIFICANT CHANGE UP (ref 3.5–5.3)
POTASSIUM SERPL-SCNC: 4 MMOL/L — SIGNIFICANT CHANGE UP (ref 3.5–5.3)
RBC # BLD: 3.67 M/UL — LOW (ref 3.8–5.2)
RBC # FLD: 12.2 % — SIGNIFICANT CHANGE UP (ref 10.3–14.5)
SODIUM SERPL-SCNC: 135 MMOL/L — SIGNIFICANT CHANGE UP (ref 135–145)
WBC # BLD: 8.22 K/UL — SIGNIFICANT CHANGE UP (ref 3.8–10.5)
WBC # FLD AUTO: 8.22 K/UL — SIGNIFICANT CHANGE UP (ref 3.8–10.5)

## 2020-08-06 PROCEDURE — 99233 SBSQ HOSP IP/OBS HIGH 50: CPT

## 2020-08-06 PROCEDURE — 99239 HOSP IP/OBS DSCHRG MGMT >30: CPT

## 2020-08-06 PROCEDURE — 93010 ELECTROCARDIOGRAM REPORT: CPT

## 2020-08-06 PROCEDURE — 99232 SBSQ HOSP IP/OBS MODERATE 35: CPT

## 2020-08-06 RX ORDER — ACETAMINOPHEN 500 MG
650 TABLET ORAL ONCE
Refills: 0 | Status: COMPLETED | OUTPATIENT
Start: 2020-08-06 | End: 2020-08-06

## 2020-08-06 RX ORDER — CIPROFLOXACIN LACTATE 400MG/40ML
1 VIAL (ML) INTRAVENOUS
Qty: 10 | Refills: 0
Start: 2020-08-06 | End: 2020-08-10

## 2020-08-06 RX ORDER — METRONIDAZOLE 500 MG
1 TABLET ORAL
Qty: 15 | Refills: 0
Start: 2020-08-06 | End: 2020-08-10

## 2020-08-06 RX ORDER — FAMOTIDINE 10 MG/ML
1 INJECTION INTRAVENOUS
Qty: 60 | Refills: 0
Start: 2020-08-06 | End: 2020-09-04

## 2020-08-06 RX ORDER — METRONIDAZOLE 500 MG
500 TABLET ORAL EVERY 8 HOURS
Refills: 0 | Status: COMPLETED | OUTPATIENT
Start: 2020-08-06 | End: 2020-08-10

## 2020-08-06 RX ORDER — CIPROFLOXACIN LACTATE 400MG/40ML
1 VIAL (ML) INTRAVENOUS
Qty: 0 | Refills: 0 | DISCHARGE
Start: 2020-08-06

## 2020-08-06 RX ORDER — CIPROFLOXACIN LACTATE 400MG/40ML
500 VIAL (ML) INTRAVENOUS EVERY 12 HOURS
Refills: 0 | Status: DISCONTINUED | OUTPATIENT
Start: 2020-08-06 | End: 2020-08-06

## 2020-08-06 RX ORDER — ESCITALOPRAM OXALATE 10 MG/1
10 TABLET, FILM COATED ORAL DAILY
Refills: 0 | Status: DISCONTINUED | OUTPATIENT
Start: 2020-08-06 | End: 2020-08-25

## 2020-08-06 RX ORDER — FAMOTIDINE 10 MG/ML
20 INJECTION INTRAVENOUS
Refills: 0 | Status: DISCONTINUED | OUTPATIENT
Start: 2020-08-06 | End: 2020-08-25

## 2020-08-06 RX ORDER — FAMOTIDINE 10 MG/ML
1 INJECTION INTRAVENOUS
Qty: 0 | Refills: 0 | DISCHARGE
Start: 2020-08-06

## 2020-08-06 RX ORDER — AMLODIPINE BESYLATE 2.5 MG/1
10 TABLET ORAL DAILY
Refills: 0 | Status: DISCONTINUED | OUTPATIENT
Start: 2020-08-06 | End: 2020-08-25

## 2020-08-06 RX ORDER — LACTOBACILLUS ACIDOPHILUS 100MM CELL
1 CAPSULE ORAL DAILY
Refills: 0 | Status: DISCONTINUED | OUTPATIENT
Start: 2020-08-06 | End: 2020-08-25

## 2020-08-06 RX ORDER — QUETIAPINE FUMARATE 200 MG/1
100 TABLET, FILM COATED ORAL AT BEDTIME
Refills: 0 | Status: DISCONTINUED | OUTPATIENT
Start: 2020-08-06 | End: 2020-08-25

## 2020-08-06 RX ORDER — METRONIDAZOLE 500 MG
1 TABLET ORAL
Qty: 0 | Refills: 0 | DISCHARGE
Start: 2020-08-06

## 2020-08-06 RX ORDER — CLONAZEPAM 1 MG
0.25 TABLET ORAL AT BEDTIME
Refills: 0 | Status: DISCONTINUED | OUTPATIENT
Start: 2020-08-06 | End: 2020-08-12

## 2020-08-06 RX ORDER — LEVOTHYROXINE SODIUM 125 MCG
75 TABLET ORAL DAILY
Refills: 0 | Status: DISCONTINUED | OUTPATIENT
Start: 2020-08-06 | End: 2020-08-25

## 2020-08-06 RX ORDER — HALOPERIDOL DECANOATE 100 MG/ML
2 INJECTION INTRAMUSCULAR ONCE
Refills: 0 | Status: DISCONTINUED | OUTPATIENT
Start: 2020-08-06 | End: 2020-08-25

## 2020-08-06 RX ORDER — LACTOBACILLUS ACIDOPHILUS 100MM CELL
1 CAPSULE ORAL
Qty: 0 | Refills: 0 | DISCHARGE
Start: 2020-08-06

## 2020-08-06 RX ORDER — CIPROFLOXACIN LACTATE 400MG/40ML
500 VIAL (ML) INTRAVENOUS EVERY 12 HOURS
Refills: 0 | Status: COMPLETED | OUTPATIENT
Start: 2020-08-06 | End: 2020-08-10

## 2020-08-06 RX ORDER — HALOPERIDOL DECANOATE 100 MG/ML
2 INJECTION INTRAMUSCULAR EVERY 4 HOURS
Refills: 0 | Status: DISCONTINUED | OUTPATIENT
Start: 2020-08-06 | End: 2020-08-25

## 2020-08-06 RX ORDER — METRONIDAZOLE 500 MG
500 TABLET ORAL EVERY 8 HOURS
Refills: 0 | Status: DISCONTINUED | OUTPATIENT
Start: 2020-08-06 | End: 2020-08-06

## 2020-08-06 RX ORDER — ACETAMINOPHEN 500 MG
325 TABLET ORAL EVERY 6 HOURS
Refills: 0 | Status: DISCONTINUED | OUTPATIENT
Start: 2020-08-06 | End: 2020-08-22

## 2020-08-06 RX ADMIN — AMLODIPINE BESYLATE 10 MILLIGRAM(S): 2.5 TABLET ORAL at 05:12

## 2020-08-06 RX ADMIN — Medication 0.25 MILLIGRAM(S): at 20:31

## 2020-08-06 RX ADMIN — Medication 25 MILLIGRAM(S): at 05:41

## 2020-08-06 RX ADMIN — QUETIAPINE FUMARATE 100 MILLIGRAM(S): 200 TABLET, FILM COATED ORAL at 20:33

## 2020-08-06 RX ADMIN — FAMOTIDINE 20 MILLIGRAM(S): 10 INJECTION INTRAVENOUS at 20:33

## 2020-08-06 RX ADMIN — Medication 100 MILLIGRAM(S): at 05:12

## 2020-08-06 RX ADMIN — Medication 500 MILLIGRAM(S): at 12:58

## 2020-08-06 RX ADMIN — Medication 500 MILLIGRAM(S): at 20:42

## 2020-08-06 RX ADMIN — ESCITALOPRAM OXALATE 10 MILLIGRAM(S): 10 TABLET, FILM COATED ORAL at 11:23

## 2020-08-06 RX ADMIN — Medication 1 TABLET(S): at 05:13

## 2020-08-06 RX ADMIN — HEPARIN SODIUM 5000 UNIT(S): 5000 INJECTION INTRAVENOUS; SUBCUTANEOUS at 05:12

## 2020-08-06 RX ADMIN — Medication 500 MILLIGRAM(S): at 22:21

## 2020-08-06 RX ADMIN — Medication 25 MILLIGRAM(S): at 20:32

## 2020-08-06 RX ADMIN — Medication 200 MILLIGRAM(S): at 06:50

## 2020-08-06 RX ADMIN — FAMOTIDINE 20 MILLIGRAM(S): 10 INJECTION INTRAVENOUS at 05:13

## 2020-08-06 RX ADMIN — Medication 75 MICROGRAM(S): at 05:12

## 2020-08-06 NOTE — PROGRESS NOTE ADULT - PROBLEM SELECTOR PLAN 4
- was holding home amlodipine 10 and losartan 100  - restarted amlodipine 10 for given -140s - C/w amlodipine  - Continue to hold losartan given BP wnl. Follow up with PCP to adjust BP regimen

## 2020-08-06 NOTE — PROGRESS NOTE ADULT - PROBLEM SELECTOR PLAN 3
Pt has bipolar disorder with psychotic features, depression, SI/HI multiple [facility] hospitalizations  -cont psychiatry medications: quetiapine, escitalopram, pregabalin  -Per psych recs, pt no longer needs 1:1   -Pt was supposed to have ECT treatment 8/3, appt postponed until medical condition is managed. Psych following, ECT done on 8/5 at [facility] Pt has bipolar disorder with psychotic features, depression, SI/HI multiple [facility] hospitalizations  - Cont psychiatry medications: quetiapine, escitalopram, pregabalin  - No indication for 1:1 per psych  - S/p ECT 8/5;   - Will contact Health system to initiate transfer back to [facility] for continued ECT. Pt has bipolar disorder with psychotic features, depression, SI/HI multiple [facility] hospitalizations  - Cont psychiatry medications: quetiapine, escitalopram, pregabalin  - No indication for 1:1 per psych  - S/p ECT 8/5;   - Patient will return to [facility] for continued ECT.

## 2020-08-06 NOTE — PROGRESS NOTE BEHAVIORAL HEALTH - NSBHCONSULTMEDAGITATION_PSY_A_CORE FT
- haldol 2mg q6h PO/IM/IV PRN for agitation

## 2020-08-06 NOTE — PROGRESS NOTE BEHAVIORAL HEALTH - NSBHCONSULTOBSREASON_PSY_A_CORE FT
Patient currently endorsing no SI, no intent, no plan, no need for 1:1

## 2020-08-06 NOTE — PROGRESS NOTE BEHAVIORAL HEALTH - RISK ASSESSMENT
Patient is at elevated risk given her recent SA, current depressed episode, hx multiple inpatient hospitalizations. Protective factors include no current active SI, admits to some improvement in mood, is help seeking, currently receiving treatment.

## 2020-08-06 NOTE — PROVIDER CONTACT NOTE (OTHER) - SITUATION
Patient has a temperature of 100.9.
Patient's HR went down to 45 and patient was bradycardic.
patient HR 58 bpm. Klonopin and Seroquel ordered for bedtime.
Patient's temperature 100.8. Tylenol po given as ordered.

## 2020-08-06 NOTE — PROGRESS NOTE ADULT - ASSESSMENT
79 year old female with HTN, Bipolar Disorder s/p ECT presenting with syncope, found to have sigmoid diverticulitis.     Leukocytosis resolved  Fevers resolved, but with fevers yesterday  Received ECT this morning  Remains with RLQ pain  Initial blood cultures neg, repeat blood cultures sent    Recommend:  #Sepsis secondary to sigmoid diverticulitis  -Continue cipro/ flagyl to complete a total of 10 days  -F/U repeat blood cultures so far no growth to date    #Fevers  -Monitor fever curve  -Now afebrile  -Continue abx  -F/U blood cxs    #Bipolar Disorder  -s/p ECT yesterday    #Leukocytosis  -Resolved  -Continue to trend    Constantin Llanes MD  Pager [phone]  After 5pm/weekends call [phone]    Discussed plan with primary team.

## 2020-08-06 NOTE — PROVIDER CONTACT NOTE (OTHER) - RECOMMENDATIONS
Monitor temperature
Provider notified. Monitor patient.
QUINTON Rutherford made aware.
Recommended for MD to verify if both medications to be given as ordered.

## 2020-08-06 NOTE — PROGRESS NOTE BEHAVIORAL HEALTH - NSBHFUPINTERVALHXFT_PSY_A_CORE
Chart reviewed. Patient with no PRNs overnight and no acute events.     Patient found for f/u laying in bed and is pleasant and calm on approach. She states that she would prefer to be addressed by her middle name "Aurea" as this is what her family and friends call her. She reports that she is in the medical hospital because of lower abd pain and diarrhea, stating that being acutely medically ill has made her feel somewhat more depressed. The patient acknowledges her recent suicide attempt, calling it "stupid" stating she regrets having done it. She reports that ECT treatments have made her depression better and she would like to continue ECT at [facility] once medically stable. She states that she is enjoying family visits and is looking forward to being with them. She denies suicidal/homicidal i/i/p on interview, denies AH/VH and does not voice delusions. Patient is AAOx4 and able to state days of the week backwards (difficulty with months of year backwards).
patient feels  "okay" denies SI, understands that ECT is helping her, amenable to doing it tomorrow AM
Chart reviewed. Patient afebrile overnight.    This AM, patient seen and examined. Initially has some ambivalence about returning to [facility], wants to receive ECT treatments at [facility], but also understands that she needs ECT and if medically cleared cannot receive it at [facility]. Denies SI/HI right now. Denies AH/VH. Feeling like her abdominal pain is improving. Otherwise, no issues.
Chart reviewed. Patient had fever overnight. Had ECT this AM.    Seen after ECT. She said that she is doing ok. Wants to continue with ECT, does not think she is ready to transition to outpatient ECT. Initially reluctant to return to [facility], wanted ECT treatments to be done here, but understood that if she is medically cleared she would go back. Denies SI/HI right now. Continues to complain of abdominal pain. No AH/VH elicited.

## 2020-08-06 NOTE — PROGRESS NOTE ADULT - PROBLEM SELECTOR PLAN 7
-transfer back to [facility] once medically cleared -transfer back to [facility] for continued ECT

## 2020-08-06 NOTE — PROGRESS NOTE BEHAVIORAL HEALTH - SUMMARY
79 year-old  female, with history of Bipolar with psychotic features, Anxiety, Depression, with significant medication resistance, with prior multiple in-patient hospitalizations (~7 around age 32's) with positive responses to ECT and stable for 40 years; and 2 recent admissions this year (2020) between 2/2020 - 5/1/2020 with precipitant being a fall 3/2020 leading to being on medication and triggered psychiatric decompensation, with prior suicidal ideation and suicide attempt via cutting wrists (needing sutures), admitted to [facility] 7/15/20, for SI and HI in context of bipolar depression w/o psychosis, received 5x ECT treatments, next treatment was scheduled for 8/3, transferred from [facility] to [facility] on 7/31 for fever, found to be septic and admitted for workup w/ suspected GI infection.    Patient had ECT yesterday. She has some ambivalence about returning to [facility], but does recognize the importance of continued ECT. She is now medically cleared and will return to [facility] for continued ECT.   Would recommend trying to coordinate ECT for tomorrow at [facility] if possible.    PLAN:   - consider ECT tomorrow at [facility], ensure NPO for proper amount of time prior to ECT  - Lexapro 10 mg daily, seroquel 100mg qhs, ativan 1mg BID, klonopin 0.25mg qhs, lyrica 25mg BID.  - Can consider haldol 2mg q6h PO/IM/IV PRN for agitation  - will return to [facility] once medically cleared, plan is for patient to [facility] voluntary legals

## 2020-08-06 NOTE — PROGRESS NOTE BEHAVIORAL HEALTH - NSBHCHARTREVIEWINVESTIGATE_PSY_A_CORE FT
< from: 12 Lead ECG (07.31.20 @ 15:25) >    QTC Calculation(Bezet) 462 ms    < end of copied text >
EKG QTc on 78/01 458ms with widened QRS 138ms
no new
no new

## 2020-08-06 NOTE — PROGRESS NOTE ADULT - PROBLEM SELECTOR PLAN 1
- abd tenderness with diarrhea, now improving  - stool GI PCR positive for EAEC (e.coli)  - CT abd pelvis with contrast showed sigmoid diverticulitis  - c/w lactobacillus for GI proph  -d/c Zosyn. Now on Cipro/Flagyl (started on 8/2)   -tolerating reg diet  -Given continued abd pain, will start 15 mg toradol IV q6, pepcid 20 mg PO  -temp 100.8 this am (8/5), will repeat cx, continue trending. Received Tylenol with good effect. Leukocytosis.   -ID consulted given recurrent fevers. Rec'd continued abx treatment and repeat CT abd/pelv tomorrow if fevers, abdominal pain persists, appreciate recs  -D/c fluids - abd tenderness with diarrhea, now improving  - stool GI PCR positive for EAEC (e.coli)  - CT abd pelvis with contrast showed sigmoid diverticulitis  - S/p Zosyn, will continue on Cipro and Flagyl for a total of 10-day course per ID.   - c/w lactobacillus for GI proph  -tolerating reg diet  -Given continued abd pain, will start 15 mg toradol IV q6, pepcid 20 mg PO  -temp 100.8 this am (8/5), will repeat cx, continue trending. Received Tylenol with good effect. Leukocytosis.   -ID consulted given recurrent fevers. Rec'd continued abx treatment and repeat CT abd/pelv tomorrow if fevers, abdominal pain persists, appreciate recs  -D/c fluids - Diarrhea resolved, minimal abdominal tenderness, afebrile > 24 hours, and no leukocytosis. Cleared by ID for discharge back to [facility] with total 10-day course of abx.   - stool GI PCR positive for EAEC (e.coli)  - CT abd pelvis with contrast showed sigmoid diverticulitis  - c/w lactobacillus for GI proph  - Tolerating reg diet

## 2020-08-06 NOTE — PROVIDER CONTACT NOTE (OTHER) - BACKGROUND
Patient admitted for syncope and fall, abdominal pain.
Patient admitted with recent fall, PMHX of bipolar disorder.
Patient was admitted for syncope.
Patient admitted for syncope and collapse. Sepsis due to diverticulitis

## 2020-08-06 NOTE — PROVIDER CONTACT NOTE (OTHER) - ASSESSMENT
Patient's temperature 100.8. Tylenol po given as ordered.
Patient asymptomatic, no signs of chest pain, dizziness, nausea. Patient alert, sitting in bed. Safety precautions maintained. Bed alarm on. Will continue to monitor
Patient temperature of 100.9. Asymptomatic. Does not complain of discomfort.
Patient was asymptomatic and denied dizziness/lightheadedness.

## 2020-08-06 NOTE — PROGRESS NOTE ADULT - ASSESSMENT
78yo F with PMH of HTN and hx bipolar disorder with psychotic features presents with syncope and abdominal pain, likely due to orthostatic hypotension and sigmoid diverticulitis found on abd CT, stool culture positive for e. coli. 80yo F with PMH of HTN and hx bipolar disorder with psychotic features presents with syncope and abdominal pain, likely due to orthostatic hypotension and sigmoid diverticulitis found on abd CT, stool culture positive for e. coli. S/p ECT on 8/5. Clinically stable, pending d/c to [facility] for continued ECT.

## 2020-08-06 NOTE — PROGRESS NOTE ADULT - SUBJECTIVE AND OBJECTIVE BOX
CC: Patient is a 79y old  Female who presents with a chief complaint of Syncope (06 Aug 2020 08:15)    ID following for diverticulitis    Interval History/ROS: Patient states she feels well. RLQ pain improving. No fevers.    Rest of ROS negative.    Allergies  No Known Allergies    ANTIMICROBIALS:  ciprofloxacin     Tablet 500 every 12 hours  metroNIDAZOLE    Tablet 500 every 8 hours    OTHER MEDS:  acetaminophen   Tablet .. 650 milliGRAM(s) Oral every 6 hours PRN  amLODIPine   Tablet 10 milliGRAM(s) Oral daily  clonazePAM Oral Disintegrating Tablet 0.25 milliGRAM(s) Oral at bedtime  escitalopram 10 milliGRAM(s) Oral daily  famotidine    Tablet 20 milliGRAM(s) Oral two times a day  heparin   Injectable 5000 Unit(s) SubCutaneous every 12 hours  lactobacillus acidophilus 1 Tablet(s) Oral two times a day  levothyroxine 75 MICROGram(s) Oral daily  LORazepam     Tablet 1 milliGRAM(s) Oral two times a day PRN  pregabalin 25 milliGRAM(s) Oral two times a day  QUEtiapine 100 milliGRAM(s) Oral at bedtime    PE:    Vital Signs Last 24 Hrs  T(C): 37.5 (06 Aug 2020 11:51), Max: 37.5 (06 Aug 2020 11:51)  T(F): 99.5 (06 Aug 2020 11:51), Max: 99.5 (06 Aug 2020 11:51)  HR: 66 (06 Aug 2020 12:27) (56 - 72)  BP: 119/56 (06 Aug 2020 11:51) (116/62 - 126/51)  BP(mean): --  RR: 17 (06 Aug 2020 11:51) (16 - 17)  SpO2: 96% (06 Aug 2020 11:51) (96% - 98%)    Gen: AOx3, NAD  CV: S1+S2 normal, no murmurs  Resp: Clear bilat, no resp distress  Abd: Soft,+BS, RLQ tenderness improving  Ext: No LE edema, no wounds  : No Charlton  IV/Skin: No thrombophlebitis  Neuro: no focal deficits    LABS:                          11.7   8.22  )-----------( 303      ( 06 Aug 2020 05:41 )             34.9       08-06    135  |  99  |  8   ----------------------------<  95  4.0   |  23  |  0.64    Ca    9.0      06 Aug 2020 05:41  Phos  3.1     08-06  Mg     1.8     08-06            MICROBIOLOGY:  v  .Blood Blood-Peripheral  08-04-20   No growth to date.  --  --      .Stool Feces  08-01-20   Enteroaggregative E. coli (EAEC)  DETECTED by PCR  *******Please Note:*******  GI panel PCR evaluates for:  Campylobacter, Plesiomonas shigelloides, Salmonella,  Vibrio, Yersinia enterocolitica, Enteroaggregative  Escherichia coli (EAEC), Enteropathogenic E.coli (EPEC),  Enterotoxigenic E. coli (ETEC) lt/st, Shiga-like  toxin-producing E. coli (STEC) stx1/stx2,  Shigella/ Enteroinvasive E. coli (EIEC), Cryptosporidium,  Cyclospora cayetanensis, Entamoeba histolytica,  Giardia lamblia, Adenovirus F 40/41, Astrovirus,  Norovirus GI/GII, Rotavirus A, Sapovirus  --  --      .Blood Blood-Peripheral  07-31-20   No Growth Final  --  --      .Urine Clean Catch (Midstream)  07-31-20   <10,000 CFU/mL Normal Urogenital Kaela  --  --      .Urine Clean Catch (Midstream)  07-15-20   >100,000 CFU/ml Aerococcus species Aerococcus species  "Aerococcus spp. are predictably susceptible to penicillin,  ampicillin, tetracycline, and vancomycin.  All isolates are  resistant to sulfonamides"  <10,000 CFU/ml Normal Urogenital kaela present  --  --      .Blood None  07-14-20   No Growth Final  --  --    RADIOLOGY:    < from: CT Abdomen and Pelvis w/ IV Cont (08.01.20 @ 09:28) >  IMPRESSION:  Sigmoid diverticulitis.    < end of copied text >

## 2020-08-06 NOTE — PROGRESS NOTE ADULT - ATTENDING COMMENTS
Patient seen and examined, case d/w house staff.  79F with PMH HTN, bipolar d/o on ECT at [facility], p/w syncope, lower abdominal pain, found to have acute sigmoid diverticulitis, started on zosyn.   No SI/HI, off CO.      Assessment/plan:  # acute sigmoid diverticulitis, infectious diarrhea:   afebrile, abdominal pain resolving, d/c back to [facility] today on cipro/flagyl to complete 10 day course, d/w ID and SW, has bed at [facility] today  stool PCR enteroaggregative e. coli  pain control, lactobacillus, GI ppx pepcid  encourage regular diet  # bipolar d/o, h/o SI/HI: no active SI/HI, had ECT on 8/5,  cont psych meds (lexapro, Klonopin, seroquel, ativan prn), monitor QTc on EKG to ensure QTc<500 ms  # syncope, +orthostasis: resolved,  hold losartan on d/c, encourage po intake, monitor BP  # covid antibody +, PCR neg: recent exposure, no acute infection, satting well on RA  # DVT ppx  # dispo: d/c to [facility] today  Time spent on discharge 35 minutes coordinating discharge plan and discussing with patient and family.

## 2020-08-06 NOTE — PROGRESS NOTE ADULT - PROBLEM SELECTOR PLAN 2
orthostatic hypotension  -likely orthostatic due to long standing problem of dizziness after getting out of bed per pt  -orthostatic vitals initially positive, >20mmHg change from sitting to standing  -Pt not on tele, serial EKGs performed, no abnormalities. Serial EKG and QTc checks, WNL for pt baseline Likely 2/2 orthostatic hypotension  - No cardiac symptoms and no further episode of syncope while inpatient  - Orthostatic pressure normalized s/p IVF  - C/w compression stocking, and advised patient to go slow when changing positions, and keep hydration.

## 2020-08-06 NOTE — PROGRESS NOTE BEHAVIORAL HEALTH - NSBHCHARTREVIEWLAB_PSY_A_CORE FT
11.2   8.72  )-----------( 272      ( 04 Aug 2020 06:25 )             35.1     08-04    135  |  101  |  5<L>  ----------------------------<  111<H>  3.8   |  23  |  0.60    Ca    8.0<L>      04 Aug 2020 06:25  Phos  2.6     08-04  Mg     1.5     08-04
CBC Full  -  ( 03 Aug 2020 07:26 )  WBC Count : 9.87 K/uL  RBC Count : 3.71 M/uL  Hemoglobin : 12.1 g/dL  Hematocrit : 35.6 %  Platelet Count - Automated : 264 K/uL  Mean Cell Volume : 96.0 fL  Mean Cell Hemoglobin : 32.6 pg  Mean Cell Hemoglobin Concentration : 34.0 %  Auto Neutrophil # : x  Auto Lymphocyte # : x  Auto Monocyte # : x  Auto Eosinophil # : x  Auto Basophil # : x  Auto Neutrophil % : x  Auto Lymphocyte % : x  Auto Monocyte % : x  Auto Eosinophil % : x  Auto Basophil % : x    08-03    136  |  100  |  5<L>  ----------------------------<  109<H>  3.6   |  24  |  0.52    Ca    8.9      03 Aug 2020 07:26  Phos  2.8     08-03  Mg     1.8     08-03
11.7   8.22  )-----------( 303      ( 06 Aug 2020 05:41 )             34.9   08-06    135  |  99  |  8   ----------------------------<  95  4.0   |  23  |  0.64    Ca    9.0      06 Aug 2020 05:41  Phos  3.1     08-06  Mg     1.8     08-06
11.9   11.48 )-----------( 289      ( 05 Aug 2020 12:30 )             37.2   08-05    136  |  98  |  4<L>  ----------------------------<  103<H>  3.6   |  25  |  0.55    Ca    9.0      05 Aug 2020 12:30  Phos  2.5     08-05  Mg     1.8     08-05

## 2020-08-06 NOTE — PROGRESS NOTE BEHAVIORAL HEALTH - NSBHCHARTREVIEWVS_PSY_A_CORE FT
Vital Signs Last 24 Hrs  T(C): 37.2 (03 Aug 2020 05:26), Max: 37.2 (03 Aug 2020 05:26)  T(F): 99 (03 Aug 2020 05:26), Max: 99 (03 Aug 2020 05:26)  HR: 60 (03 Aug 2020 05:26) (60 - 70)  BP: 130/65 (03 Aug 2020 05:26) (130/65 - 161/64)  BP(mean): --  RR: 18 (03 Aug 2020 05:26) (18 - 18)  SpO2: 98% (03 Aug 2020 05:26) (98% - 100%)
Vital Signs Last 24 Hrs  T(C): 37.2 (04 Aug 2020 12:20), Max: 38.3 (04 Aug 2020 05:17)  T(F): 99 (04 Aug 2020 12:20), Max: 100.9 (04 Aug 2020 05:17)  HR: 57 (04 Aug 2020 11:19) (57 - 74)  BP: 113/51 (04 Aug 2020 11:19) (113/51 - 148/58)  BP(mean): --  RR: 18 (04 Aug 2020 11:19) (17 - 18)  SpO2: 98% (04 Aug 2020 11:19) (97% - 100%)
Vital Signs Last 24 Hrs  T(C): 37.5 (06 Aug 2020 11:51), Max: 37.5 (06 Aug 2020 11:51)  T(F): 99.5 (06 Aug 2020 11:51), Max: 99.5 (06 Aug 2020 11:51)  HR: 59 (06 Aug 2020 11:51) (56 - 72)  BP: 119/- (06 Aug 2020 11:51) (116/62 - 126/51)  BP(mean): --  RR: 17 (06 Aug 2020 11:51) (16 - 17)  SpO2: 96% (06 Aug 2020 11:51) (96% - 98%)
Vital Signs Last 24 Hrs  T(C): 37 (05 Aug 2020 11:41), Max: 38.2 (04 Aug 2020 21:12)  T(F): 98.6 (05 Aug 2020 11:41), Max: 100.8 (04 Aug 2020 21:12)  HR: 57 (05 Aug 2020 11:41) (45 - 76)  BP: 124/58 (05 Aug 2020 11:41) (124/58 - 145/58)  BP(mean): --  RR: 17 (05 Aug 2020 11:41) (16 - 18)  SpO2: 100% (05 Aug 2020 11:41) (97% - 100%)

## 2020-08-06 NOTE — PROGRESS NOTE BEHAVIORAL HEALTH - OTHER
in bed, not assessed initially euthymic, then becomes more dysthymic as meeting continues full range wants ECT

## 2020-08-06 NOTE — PROGRESS NOTE ADULT - SUBJECTIVE AND OBJECTIVE BOX
PROGRESS NOTE:   Authoted by Dr. Florencia Dubon MD  Pager [phone] [facility], [id number] [place]     Patient is a 79y old  Female who presents with a chief complaint of Syncope (05 Aug 2020 11:36)      SUBJECTIVE / OVERNIGHT EVENTS:     REVIEW OF SYSTEMS:    CONSTITUTIONAL: No weakness, fevers or chills  EYES/ENT: No visual changes;  No vertigo or throat pain   NECK: No pain or stiffness  RESPIRATORY: No cough, wheezing, hemoptysis; No shortness of breath  CARDIOVASCULAR: No chest pain or palpitations  GASTROINTESTINAL: No abdominal or epigastric pain. No nausea, vomiting, or hematemesis; No diarrhea or constipation. No melena or hematochezia.  GENITOURINARY: No dysuria, frequency or hematuria  NEUROLOGICAL: No numbness or weakness  SKIN: No itching, rashes    MEDICATIONS  (STANDING):  amLODIPine   Tablet 10 milliGRAM(s) Oral daily  ciprofloxacin   IVPB 400 milliGRAM(s) IV Intermittent every 12 hours  clonazePAM Oral Disintegrating Tablet 0.25 milliGRAM(s) Oral at bedtime  escitalopram 10 milliGRAM(s) Oral daily  famotidine    Tablet 20 milliGRAM(s) Oral two times a day  heparin   Injectable 5000 Unit(s) SubCutaneous every 12 hours  lactobacillus acidophilus 1 Tablet(s) Oral two times a day  levothyroxine 75 MICROGram(s) Oral daily  metroNIDAZOLE  IVPB 500 milliGRAM(s) IV Intermittent every 8 hours  metroNIDAZOLE  IVPB      pregabalin 25 milliGRAM(s) Oral two times a day  QUEtiapine 100 milliGRAM(s) Oral at bedtime    MEDICATIONS  (PRN):  acetaminophen   Tablet .. 650 milliGRAM(s) Oral every 6 hours PRN Temp greater or equal to 38C (100.4F), Moderate Pain (4 - 6), Severe Pain (7 - 10)  LORazepam     Tablet 1 milliGRAM(s) Oral two times a day PRN Anxiety      CAPILLARY BLOOD GLUCOSE        I&O's Summary    05 Aug 2020 07:01  -  06 Aug 2020 07:00  --------------------------------------------------------  IN: 325 mL / OUT: 0 mL / NET: 325 mL        PHYSICAL EXAM:  Vital Signs Last 24 Hrs  T(C): 36.3 (06 Aug 2020 05:11), Max: 37.2 (05 Aug 2020 21:04)  T(F): 97.3 (06 Aug 2020 05:11), Max: 99 (05 Aug 2020 21:04)  HR: 72 (06 Aug 2020 05:11) (56 - 72)  BP: 126/51 (06 Aug 2020 05:11) (116/62 - 126/51)  BP(mean): --  RR: 16 (06 Aug 2020 05:11) (16 - 17)  SpO2: 98% (06 Aug 2020 05:11) (98% - 100%)    CONSTITUTIONAL: NAD, well-developed  RESPIRATORY: Normal respiratory effort; lungs are clear to auscultation bilaterally  CARDIOVASCULAR: Regular rate and rhythm, normal S1 and S2, no murmur/rub/gallop; No lower extremity edema; Peripheral pulses are 2+ bilaterally  ABDOMEN: Nontender to palpation, normoactive bowel sounds, no rebound/guarding; No hepatosplenomegaly  MUSCLOSKELETAL: no clubbing or cyanosis of digits; no joint swelling or tenderness to palpation  PSYCH: A+O to person, place, and time; affect appropriate  NEURO: Non-focal, no tremors  SKIN: No rashes    LABS:                        11.7   8.22  )-----------( 303      ( 06 Aug 2020 05:41 )             34.9     08-06    135  |  99  |  8   ----------------------------<  95  4.0   |  23  |  0.64    Ca    9.0      06 Aug 2020 05:41  Phos  3.1     08-06  Mg     1.8     08-06                Culture - Blood (collected 04 Aug 2020 21:12)  Source: .Blood Blood-Venous  Preliminary Report (05 Aug 2020 22:01):    No growth to date.    Culture - Blood (collected 04 Aug 2020 21:12)  Source: .Blood Blood-Peripheral  Preliminary Report (05 Aug 2020 22:01):    No growth to date.        RADIOLOGY & ADDITIONAL TESTS:  No new imaging or tests    COORDINATION OF CARE:  Care Discussed with Consultants/Other Providers [Y/N]:  Prior or Outpatient Records Reviewed [Y/N]: PROGRESS NOTE:   Authoted by Dr. Florencia Dubon MD  Pager [phone] [facility], [address] [facility]     Patient is a 79y old  Female who presents with a chief complaint of Syncope (05 Aug 2020 11:36)      SUBJECTIVE / OVERNIGHT EVENTS: No acute events overnight. Patient reports abdominal pain is about 4/10, improved compared to yesterday. No diarrhea, no CP, SOB or BLE pain.     REVIEW OF SYSTEMS:    CONSTITUTIONAL: No fevers or chills  EYES/ENT: No visual changes or throat pain   NECK: No pain  RESPIRATORY: No cough and shortness of breath  CARDIOVASCULAR: No chest pain or palpitations  GASTROINTESTINAL: (+) abdominal pain. No nausea, vomiting, diarrhea or constipation. No melena or hematochezia.  GENITOURINARY: No dysuria, frequency or hematuria  NEUROLOGICAL: No numbness or weakness  SKIN: No itching, rashes    MEDICATIONS  (STANDING):  amLODIPine   Tablet 10 milliGRAM(s) Oral daily  ciprofloxacin   IVPB 400 milliGRAM(s) IV Intermittent every 12 hours  clonazePAM Oral Disintegrating Tablet 0.25 milliGRAM(s) Oral at bedtime  escitalopram 10 milliGRAM(s) Oral daily  famotidine    Tablet 20 milliGRAM(s) Oral two times a day  heparin   Injectable 5000 Unit(s) SubCutaneous every 12 hours  lactobacillus acidophilus 1 Tablet(s) Oral two times a day  levothyroxine 75 MICROGram(s) Oral daily  metroNIDAZOLE  IVPB 500 milliGRAM(s) IV Intermittent every 8 hours  metroNIDAZOLE  IVPB      pregabalin 25 milliGRAM(s) Oral two times a day  QUEtiapine 100 milliGRAM(s) Oral at bedtime    MEDICATIONS  (PRN):  acetaminophen   Tablet .. 650 milliGRAM(s) Oral every 6 hours PRN Temp greater or equal to 38C (100.4F), Moderate Pain (4 - 6), Severe Pain (7 - 10)  LORazepam     Tablet 1 milliGRAM(s) Oral two times a day PRN Anxiety      CAPILLARY BLOOD GLUCOSE        I&O's Summary    05 Aug 2020 07:01  -  06 Aug 2020 07:00  --------------------------------------------------------  IN: 325 mL / OUT: 0 mL / NET: 325 mL        PHYSICAL EXAM:  Vital Signs Last 24 Hrs  T(C): 36.3 (06 Aug 2020 05:11), Max: 37.2 (05 Aug 2020 21:04)  T(F): 97.3 (06 Aug 2020 05:11), Max: 99 (05 Aug 2020 21:04)  HR: 72 (06 Aug 2020 05:11) (56 - 72)  BP: 126/51 (06 Aug 2020 05:11) (116/62 - 126/51)  BP(mean): --  RR: 16 (06 Aug 2020 05:11) (16 - 17)  SpO2: 98% (06 Aug 2020 05:11) (98% - 100%)    CONSTITUTIONAL: NAD, well-developed  RESPIRATORY: Normal respiratory effort; lungs are clear to auscultation bilaterally  CARDIOVASCULAR: Regular rate and rhythm, normal S1 and S2; No lower extremity edema  ABDOMEN: Mild lower quadrant tenderness, normoactive bowel sounds, no rebound/guarding  MUSCULOSKELETAL: no clubbing or cyanosis of digits; no joint swelling or tenderness to palpation  PSYCH: A+O to person, place, and time; affect appropriate  NEURO: Non-focal, no tremors  SKIN: No rashes    LABS:                        11.7   8.22  )-----------( 303      ( 06 Aug 2020 05:41 )             34.9     08-06    135  |  99  |  8   ----------------------------<  95  4.0   |  23  |  0.64    Ca    9.0      06 Aug 2020 05:41  Phos  3.1     08-06  Mg     1.8     08-06          Culture - Blood (collected 04 Aug 2020 21:12)  Source: .Blood Blood-Venous  Preliminary Report (05 Aug 2020 22:01):    No growth to date.    Culture - Blood (collected 04 Aug 2020 21:12)  Source: .Blood Blood-Peripheral  Preliminary Report (05 Aug 2020 22:01):    No growth to date.        RADIOLOGY & ADDITIONAL TESTS:  No new imaging or tests    COORDINATION OF CARE:  Care Discussed with Consultants/Other Providers [Y/N]: ID, psych  Prior or Outpatient Records Reviewed [Y/N]: N

## 2020-08-06 NOTE — PROVIDER CONTACT NOTE (OTHER) - ACTION/TREATMENT ORDERED:
Provider made aware. Will continue to monitor.
Provider notified. Monitor patient.
Provider stated to give Seroquel and hold Klonopin as ordered. Will continue to monitor.
Will continue to monitor, tylenol PRN given,

## 2020-08-07 LAB
HCT VFR BLD CALC: 37.3 % — SIGNIFICANT CHANGE UP (ref 34.5–45)
HGB BLD-MCNC: 11.8 G/DL — SIGNIFICANT CHANGE UP (ref 11.5–15.5)
MCHC RBC-ENTMCNC: 30.9 PG — SIGNIFICANT CHANGE UP (ref 27–34)
MCHC RBC-ENTMCNC: 31.6 % — LOW (ref 32–36)
MCV RBC AUTO: 97.6 FL — SIGNIFICANT CHANGE UP (ref 80–100)
NRBC # FLD: 0 K/UL — SIGNIFICANT CHANGE UP (ref 0–0)
PLATELET # BLD AUTO: 319 K/UL — SIGNIFICANT CHANGE UP (ref 150–400)
PMV BLD: 10.1 FL — SIGNIFICANT CHANGE UP (ref 7–13)
RBC # BLD: 3.82 M/UL — SIGNIFICANT CHANGE UP (ref 3.8–5.2)
RBC # FLD: 12.3 % — SIGNIFICANT CHANGE UP (ref 10.3–14.5)
SARS-COV-2 RNA SPEC QL NAA+PROBE: SIGNIFICANT CHANGE UP
WBC # BLD: 8.16 K/UL — SIGNIFICANT CHANGE UP (ref 3.8–10.5)
WBC # FLD AUTO: 8.16 K/UL — SIGNIFICANT CHANGE UP (ref 3.8–10.5)

## 2020-08-07 PROCEDURE — 99221 1ST HOSP IP/OBS SF/LOW 40: CPT

## 2020-08-07 PROCEDURE — 99232 SBSQ HOSP IP/OBS MODERATE 35: CPT

## 2020-08-07 RX ADMIN — Medication 500 MILLIGRAM(S): at 13:47

## 2020-08-07 RX ADMIN — ESCITALOPRAM OXALATE 10 MILLIGRAM(S): 10 TABLET, FILM COATED ORAL at 08:08

## 2020-08-07 RX ADMIN — Medication 25 MILLIGRAM(S): at 08:08

## 2020-08-07 RX ADMIN — Medication 500 MILLIGRAM(S): at 08:08

## 2020-08-07 RX ADMIN — AMLODIPINE BESYLATE 10 MILLIGRAM(S): 2.5 TABLET ORAL at 08:08

## 2020-08-07 RX ADMIN — FAMOTIDINE 20 MILLIGRAM(S): 10 INJECTION INTRAVENOUS at 21:24

## 2020-08-07 RX ADMIN — Medication 75 MICROGRAM(S): at 08:08

## 2020-08-07 RX ADMIN — QUETIAPINE FUMARATE 100 MILLIGRAM(S): 200 TABLET, FILM COATED ORAL at 21:24

## 2020-08-07 RX ADMIN — Medication 0.25 MILLIGRAM(S): at 21:24

## 2020-08-07 RX ADMIN — Medication 500 MILLIGRAM(S): at 06:22

## 2020-08-07 RX ADMIN — Medication 325 MILLIGRAM(S): at 19:09

## 2020-08-07 RX ADMIN — FAMOTIDINE 20 MILLIGRAM(S): 10 INJECTION INTRAVENOUS at 08:08

## 2020-08-07 RX ADMIN — Medication 500 MILLIGRAM(S): at 21:24

## 2020-08-07 RX ADMIN — Medication 325 MILLIGRAM(S): at 17:40

## 2020-08-07 RX ADMIN — Medication 25 MILLIGRAM(S): at 21:24

## 2020-08-07 RX ADMIN — Medication 1 TABLET(S): at 08:08
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
## 2020-08-07 NOTE — CONSULT NOTE ADULT - ASSESSMENT
79 F with above psych hx, HTN, now found with diverticulitis , completing course of abx, with breakthrough fever    1- Diverticulitis - clinically improving and resolving despite breakthrough fever - no localizing source and hemodynamically stable and non-toxic appearing - suspect fever due to resolving diverticulitis, will check wbc and repeat bcx to assure stable - for now complete po course of abx - f fevers persist or up-trending, then would recheck CT a/p r/o developing abscess     2- HTN - Arb held at [facility] d/t orthostatic-induced syncope - here BP tightly controlled especially for age, will reduce Norvasc to 5mg from 10 mg - continue to hold ARB    3- syncope - due to orthostasis and perhaps too tightly BP control - encourage po hydration and titrate BP meds as above    4- ECT - per primary team, no objection to proceed if remains afebrile

## 2020-08-08 LAB
ALBUMIN SERPL ELPH-MCNC: 2.9 G/DL — LOW (ref 3.3–5)
ALP SERPL-CCNC: 51 U/L — SIGNIFICANT CHANGE UP (ref 40–120)
ALT FLD-CCNC: 15 U/L — SIGNIFICANT CHANGE UP (ref 4–33)
ANION GAP SERPL CALC-SCNC: 12 MMO/L — SIGNIFICANT CHANGE UP (ref 7–14)
AST SERPL-CCNC: 22 U/L — SIGNIFICANT CHANGE UP (ref 4–32)
BILIRUB SERPL-MCNC: < 0.2 MG/DL — LOW (ref 0.2–1.2)
BUN SERPL-MCNC: 13 MG/DL — SIGNIFICANT CHANGE UP (ref 7–23)
CALCIUM SERPL-MCNC: 8.8 MG/DL — SIGNIFICANT CHANGE UP (ref 8.4–10.5)
CHLORIDE SERPL-SCNC: 100 MMOL/L — SIGNIFICANT CHANGE UP (ref 98–107)
CO2 SERPL-SCNC: 25 MMOL/L — SIGNIFICANT CHANGE UP (ref 22–31)
CREAT SERPL-MCNC: 1.2 MG/DL — SIGNIFICANT CHANGE UP (ref 0.5–1.3)
GLUCOSE SERPL-MCNC: 165 MG/DL — HIGH (ref 70–99)
HCT VFR BLD CALC: 33.2 % — LOW (ref 34.5–45)
HGB BLD-MCNC: 11.1 G/DL — LOW (ref 11.5–15.5)
MCHC RBC-ENTMCNC: 31.6 PG — SIGNIFICANT CHANGE UP (ref 27–34)
MCHC RBC-ENTMCNC: 33.4 % — SIGNIFICANT CHANGE UP (ref 32–36)
MCV RBC AUTO: 94.6 FL — SIGNIFICANT CHANGE UP (ref 80–100)
NRBC # FLD: 0 K/UL — SIGNIFICANT CHANGE UP (ref 0–0)
PLATELET # BLD AUTO: 282 K/UL — SIGNIFICANT CHANGE UP (ref 150–400)
PMV BLD: 9.5 FL — SIGNIFICANT CHANGE UP (ref 7–13)
POTASSIUM SERPL-MCNC: 4.2 MMOL/L — SIGNIFICANT CHANGE UP (ref 3.5–5.3)
POTASSIUM SERPL-SCNC: 4.2 MMOL/L — SIGNIFICANT CHANGE UP (ref 3.5–5.3)
PROT SERPL-MCNC: 5.7 G/DL — LOW (ref 6–8.3)
RBC # BLD: 3.51 M/UL — LOW (ref 3.8–5.2)
RBC # FLD: 12.5 % — SIGNIFICANT CHANGE UP (ref 10.3–14.5)
SODIUM SERPL-SCNC: 137 MMOL/L — SIGNIFICANT CHANGE UP (ref 135–145)
WBC # BLD: 6.87 K/UL — SIGNIFICANT CHANGE UP (ref 3.8–10.5)
WBC # FLD AUTO: 6.87 K/UL — SIGNIFICANT CHANGE UP (ref 3.8–10.5)

## 2020-08-08 PROCEDURE — 99232 SBSQ HOSP IP/OBS MODERATE 35: CPT

## 2020-08-08 RX ADMIN — Medication 75 MICROGRAM(S): at 08:48

## 2020-08-08 RX ADMIN — Medication 500 MILLIGRAM(S): at 05:51

## 2020-08-08 RX ADMIN — AMLODIPINE BESYLATE 10 MILLIGRAM(S): 2.5 TABLET ORAL at 08:49

## 2020-08-08 RX ADMIN — Medication 25 MILLIGRAM(S): at 08:48

## 2020-08-08 RX ADMIN — ESCITALOPRAM OXALATE 10 MILLIGRAM(S): 10 TABLET, FILM COATED ORAL at 08:48

## 2020-08-08 RX ADMIN — FAMOTIDINE 20 MILLIGRAM(S): 10 INJECTION INTRAVENOUS at 20:51

## 2020-08-08 RX ADMIN — Medication 0.25 MILLIGRAM(S): at 20:38

## 2020-08-08 RX ADMIN — Medication 25 MILLIGRAM(S): at 20:38

## 2020-08-08 RX ADMIN — FAMOTIDINE 20 MILLIGRAM(S): 10 INJECTION INTRAVENOUS at 08:48

## 2020-08-08 RX ADMIN — Medication 500 MILLIGRAM(S): at 20:51

## 2020-08-08 RX ADMIN — Medication 1 TABLET(S): at 08:48

## 2020-08-08 RX ADMIN — Medication 500 MILLIGRAM(S): at 08:49

## 2020-08-08 RX ADMIN — Medication 500 MILLIGRAM(S): at 21:13

## 2020-08-08 RX ADMIN — QUETIAPINE FUMARATE 100 MILLIGRAM(S): 200 TABLET, FILM COATED ORAL at 20:51

## 2020-08-08 RX ADMIN — Medication 500 MILLIGRAM(S): at 13:27

## 2020-08-09 LAB
CULTURE RESULTS: SIGNIFICANT CHANGE UP
CULTURE RESULTS: SIGNIFICANT CHANGE UP
SPECIMEN SOURCE: SIGNIFICANT CHANGE UP
SPECIMEN SOURCE: SIGNIFICANT CHANGE UP

## 2020-08-09 PROCEDURE — 99231 SBSQ HOSP IP/OBS SF/LOW 25: CPT

## 2020-08-09 RX ADMIN — Medication 75 MICROGRAM(S): at 08:57

## 2020-08-09 RX ADMIN — QUETIAPINE FUMARATE 100 MILLIGRAM(S): 200 TABLET, FILM COATED ORAL at 21:11

## 2020-08-09 RX ADMIN — Medication 0.25 MILLIGRAM(S): at 20:27

## 2020-08-09 RX ADMIN — Medication 25 MILLIGRAM(S): at 08:57

## 2020-08-09 RX ADMIN — Medication 325 MILLIGRAM(S): at 14:37

## 2020-08-09 RX ADMIN — FAMOTIDINE 20 MILLIGRAM(S): 10 INJECTION INTRAVENOUS at 21:11

## 2020-08-09 RX ADMIN — Medication 500 MILLIGRAM(S): at 06:16

## 2020-08-09 RX ADMIN — FAMOTIDINE 20 MILLIGRAM(S): 10 INJECTION INTRAVENOUS at 08:57

## 2020-08-09 RX ADMIN — Medication 25 MILLIGRAM(S): at 20:27

## 2020-08-09 RX ADMIN — Medication 500 MILLIGRAM(S): at 21:54

## 2020-08-09 RX ADMIN — Medication 325 MILLIGRAM(S): at 16:37

## 2020-08-09 RX ADMIN — Medication 500 MILLIGRAM(S): at 08:57

## 2020-08-09 RX ADMIN — Medication 500 MILLIGRAM(S): at 21:11

## 2020-08-09 RX ADMIN — Medication 500 MILLIGRAM(S): at 13:07

## 2020-08-09 RX ADMIN — Medication 1 TABLET(S): at 08:57

## 2020-08-09 RX ADMIN — AMLODIPINE BESYLATE 10 MILLIGRAM(S): 2.5 TABLET ORAL at 08:57

## 2020-08-09 RX ADMIN — ESCITALOPRAM OXALATE 10 MILLIGRAM(S): 10 TABLET, FILM COATED ORAL at 08:57

## 2020-08-10 PROCEDURE — 99232 SBSQ HOSP IP/OBS MODERATE 35: CPT | Mod: 25

## 2020-08-10 PROCEDURE — 99223 1ST HOSP IP/OBS HIGH 75: CPT

## 2020-08-10 PROCEDURE — 90870 ELECTROCONVULSIVE THERAPY: CPT

## 2020-08-10 RX ORDER — FLUMAZENIL 0.1 MG/ML
0.2 VIAL (ML) INTRAVENOUS ONCE
Refills: 0 | Status: COMPLETED | OUTPATIENT
Start: 2020-08-10 | End: 2020-08-10

## 2020-08-10 RX ADMIN — QUETIAPINE FUMARATE 100 MILLIGRAM(S): 200 TABLET, FILM COATED ORAL at 21:19

## 2020-08-10 RX ADMIN — Medication 1 TABLET(S): at 15:24

## 2020-08-10 RX ADMIN — Medication 25 MILLIGRAM(S): at 21:19

## 2020-08-10 RX ADMIN — Medication 500 MILLIGRAM(S): at 15:12

## 2020-08-10 RX ADMIN — FAMOTIDINE 20 MILLIGRAM(S): 10 INJECTION INTRAVENOUS at 21:19

## 2020-08-10 RX ADMIN — Medication 0.2 MILLIGRAM(S): at 11:28

## 2020-08-10 RX ADMIN — Medication 0.25 MILLIGRAM(S): at 21:19

## 2020-08-10 RX ADMIN — Medication 500 MILLIGRAM(S): at 06:45

## 2020-08-10 RX ADMIN — ESCITALOPRAM OXALATE 10 MILLIGRAM(S): 10 TABLET, FILM COATED ORAL at 15:25

## 2020-08-10 NOTE — PROGRESS NOTE ADULT - ASSESSMENT
79 F with Hx of bipolar disorder with psychotic features, HTN, with diverticulitis , completing course of abx. Called by primary team to reevaluate pt prior to ECT given recent fever on 8/7/20.       Diverticulitis - Clinically improving. Pt has been afebrile since 8/7. Suspect fever on 8/7 due to resolving diverticulitis. No leukocytosis on 8/8 labs.   -On po course of abx to be completed today  -If recurrent fevers or up-trending WBC, then would recheck CT a/p     HTN - Arb held at [facility] d/t orthostatic-induced syncope . Here on norvasc 10mg ad   -BPs here in low 100s systolic. Can c/w Norvasc 10 mg qd for now. However, if BPs decrease, pt w/ orthostatic hypotension/ symptoms would decrease dose (or hold based on the clinical scenario). Given her age she does not have to be tightly controlled.  May be low normal now due to resolving infection.   - continue to hold ARB    PEYMAN- Pt meeting PEYMAN criteria. She does report poor PO hydration and has been NPO since midnight for ECT.   -Please encourage PO hydration. Pt also advised to increase fluid intake after ECT.   -Will repeat BMP in next 1-2 days    Bipolar d/o -management per primary team. Pt on ECT treatment

## 2020-08-10 NOTE — PROGRESS NOTE ADULT - SUBJECTIVE AND OBJECTIVE BOX
CC/Reason for Consult: Diverticulitis    SUBJECTIVE / OVERNIGHT EVENTS: Pt states that she does not feel well. She reports having mild R lower abdominal pain described as achy in nature. She denies f/c, n/v, light headedness, CP ,SOB. She states the abdominal pain is improved compared to prior. She does not have watery diarrhea but states her stools have been very soft. She had about 3 episodes this am.      MEDICATIONS  (STANDING):  amLODIPine   Tablet 10 milliGRAM(s) Oral daily  clonazePAM Oral Disintegrating Tablet 0.25 milliGRAM(s) Oral at bedtime  escitalopram 10 milliGRAM(s) Oral daily  famotidine    Tablet 20 milliGRAM(s) Oral two times a day  lactobacillus acidophilus 1 Tablet(s) Oral daily  levothyroxine 75 MICROGram(s) Oral daily  metroNIDAZOLE    Tablet 500 milliGRAM(s) Oral every 8 hours  pregabalin 25 milliGRAM(s) Oral two times a day  QUEtiapine 100 milliGRAM(s) Oral at bedtime    MEDICATIONS  (PRN):  acetaminophen   Tablet .. 325 milliGRAM(s) Oral every 6 hours PRN Moderate Pain (4 - 6)  haloperidol     Tablet 2 milliGRAM(s) Oral every 4 hours PRN Anxiety/agitation  haloperidol    Injectable 2 milliGRAM(s) IntraMuscular once PRN Severe agitation  LORazepam     Tablet 1 milliGRAM(s) Oral every 4 hours PRN anxiety/agitation  LORazepam   Injectable 1 milliGRAM(s) IntraMuscular once PRN Severe agitation        CAPILLARY BLOOD GLUCOSE    PHYSICAL EXAM:  Vital Signs Last 24 Hrs  T(C): 37.1 (10 Aug 2020 06:06), Max: 37.8 (09 Aug 2020 15:32)  T(F): 98.7 (10 Aug 2020 06:06), Max: 100 (09 Aug 2020 15:32)  HR: --61(10 Aug 2020 06:06)  BP: --113/60(10 Aug 2020 06:06)  RR: 18 (10 Aug 2020 06:06) (18 - 18)  SpO2: 100% (10 Aug 2020 06:06) (100% - 100%)  PHYSICAL EXAM:  GENERAL: NAD, well-developed, AOx3  HEAD:  Atraumatic, Normocephalic  EYES: EOMI, PERRL, conjunctiva and sclera clear  NECK: Supple, No JVD  CHEST/LUNG: Clear to auscultation bilaterally  HEART: Regular rate and rhythm; No murmurs, rubs, or gallops, No Edema  ABDOMEN: Soft, Mildly tender, Nondistended; Bowel sounds present  EXTREMITIES:  2+ Peripheral Pulses, No clubbing, cyanosis  PSYCH: No SI/HI  NEUROLOGY: non-focal

## 2020-08-11 PROCEDURE — 99232 SBSQ HOSP IP/OBS MODERATE 35: CPT

## 2020-08-11 RX ADMIN — ESCITALOPRAM OXALATE 10 MILLIGRAM(S): 10 TABLET, FILM COATED ORAL at 09:14

## 2020-08-11 RX ADMIN — Medication 25 MILLIGRAM(S): at 09:15

## 2020-08-11 RX ADMIN — QUETIAPINE FUMARATE 100 MILLIGRAM(S): 200 TABLET, FILM COATED ORAL at 20:33

## 2020-08-11 RX ADMIN — AMLODIPINE BESYLATE 10 MILLIGRAM(S): 2.5 TABLET ORAL at 09:14

## 2020-08-11 RX ADMIN — FAMOTIDINE 20 MILLIGRAM(S): 10 INJECTION INTRAVENOUS at 20:33

## 2020-08-11 RX ADMIN — Medication 25 MILLIGRAM(S): at 20:33

## 2020-08-11 RX ADMIN — Medication 1 TABLET(S): at 09:15

## 2020-08-11 RX ADMIN — Medication 0.25 MILLIGRAM(S): at 20:33

## 2020-08-11 RX ADMIN — FAMOTIDINE 20 MILLIGRAM(S): 10 INJECTION INTRAVENOUS at 09:14

## 2020-08-11 RX ADMIN — Medication 75 MICROGRAM(S): at 07:16

## 2020-08-12 LAB
ANION GAP SERPL CALC-SCNC: 12 MMO/L — SIGNIFICANT CHANGE UP (ref 7–14)
BUN SERPL-MCNC: 19 MG/DL — SIGNIFICANT CHANGE UP (ref 7–23)
CALCIUM SERPL-MCNC: 9.3 MG/DL — SIGNIFICANT CHANGE UP (ref 8.4–10.5)
CHLORIDE SERPL-SCNC: 103 MMOL/L — SIGNIFICANT CHANGE UP (ref 98–107)
CO2 SERPL-SCNC: 26 MMOL/L — SIGNIFICANT CHANGE UP (ref 22–31)
CREAT SERPL-MCNC: 1.23 MG/DL — SIGNIFICANT CHANGE UP (ref 0.5–1.3)
CULTURE RESULTS: SIGNIFICANT CHANGE UP
GLUCOSE SERPL-MCNC: 92 MG/DL — SIGNIFICANT CHANGE UP (ref 70–99)
POTASSIUM SERPL-MCNC: 4.4 MMOL/L — SIGNIFICANT CHANGE UP (ref 3.5–5.3)
POTASSIUM SERPL-SCNC: 4.4 MMOL/L — SIGNIFICANT CHANGE UP (ref 3.5–5.3)
SODIUM SERPL-SCNC: 141 MMOL/L — SIGNIFICANT CHANGE UP (ref 135–145)
SPECIMEN SOURCE: SIGNIFICANT CHANGE UP
T PALLIDUM AB TITR SER: NEGATIVE — SIGNIFICANT CHANGE UP

## 2020-08-12 PROCEDURE — 90870 ELECTROCONVULSIVE THERAPY: CPT

## 2020-08-12 PROCEDURE — 99232 SBSQ HOSP IP/OBS MODERATE 35: CPT | Mod: 25

## 2020-08-12 RX ORDER — CLONAZEPAM 1 MG
0.25 TABLET ORAL AT BEDTIME
Refills: 0 | Status: DISCONTINUED | OUTPATIENT
Start: 2020-08-12 | End: 2020-08-19

## 2020-08-12 RX ORDER — FLUMAZENIL 0.1 MG/ML
0.2 VIAL (ML) INTRAVENOUS ONCE
Refills: 0 | Status: COMPLETED | OUTPATIENT
Start: 2020-08-12 | End: 2020-08-12

## 2020-08-12 RX ADMIN — Medication 0.25 MILLIGRAM(S): at 20:25

## 2020-08-12 RX ADMIN — FAMOTIDINE 20 MILLIGRAM(S): 10 INJECTION INTRAVENOUS at 13:24

## 2020-08-12 RX ADMIN — Medication 1 TABLET(S): at 13:24

## 2020-08-12 RX ADMIN — Medication 75 MICROGRAM(S): at 13:24

## 2020-08-12 RX ADMIN — AMLODIPINE BESYLATE 10 MILLIGRAM(S): 2.5 TABLET ORAL at 13:24

## 2020-08-12 RX ADMIN — FAMOTIDINE 20 MILLIGRAM(S): 10 INJECTION INTRAVENOUS at 21:01

## 2020-08-12 RX ADMIN — Medication 0.2 MILLIGRAM(S): at 11:10

## 2020-08-12 RX ADMIN — QUETIAPINE FUMARATE 100 MILLIGRAM(S): 200 TABLET, FILM COATED ORAL at 21:01

## 2020-08-12 RX ADMIN — ESCITALOPRAM OXALATE 10 MILLIGRAM(S): 10 TABLET, FILM COATED ORAL at 13:24

## 2020-08-12 RX ADMIN — Medication 25 MILLIGRAM(S): at 13:24

## 2020-08-12 RX ADMIN — Medication 25 MILLIGRAM(S): at 21:01

## 2020-08-13 PROCEDURE — 99232 SBSQ HOSP IP/OBS MODERATE 35: CPT

## 2020-08-13 RX ADMIN — QUETIAPINE FUMARATE 100 MILLIGRAM(S): 200 TABLET, FILM COATED ORAL at 21:39

## 2020-08-13 RX ADMIN — Medication 0.25 MILLIGRAM(S): at 20:19

## 2020-08-13 RX ADMIN — Medication 25 MILLIGRAM(S): at 09:40

## 2020-08-13 RX ADMIN — AMLODIPINE BESYLATE 10 MILLIGRAM(S): 2.5 TABLET ORAL at 09:40

## 2020-08-13 RX ADMIN — FAMOTIDINE 20 MILLIGRAM(S): 10 INJECTION INTRAVENOUS at 21:39

## 2020-08-13 RX ADMIN — Medication 25 MILLIGRAM(S): at 20:19

## 2020-08-13 RX ADMIN — FAMOTIDINE 20 MILLIGRAM(S): 10 INJECTION INTRAVENOUS at 09:40

## 2020-08-13 RX ADMIN — Medication 1 TABLET(S): at 09:40

## 2020-08-13 RX ADMIN — Medication 75 MICROGRAM(S): at 09:40

## 2020-08-13 RX ADMIN — ESCITALOPRAM OXALATE 10 MILLIGRAM(S): 10 TABLET, FILM COATED ORAL at 09:40

## 2020-08-14 PROCEDURE — 99232 SBSQ HOSP IP/OBS MODERATE 35: CPT | Mod: 25

## 2020-08-14 PROCEDURE — 90870 ELECTROCONVULSIVE THERAPY: CPT

## 2020-08-14 RX ORDER — FLUMAZENIL 0.1 MG/ML
0.2 VIAL (ML) INTRAVENOUS ONCE
Refills: 0 | Status: COMPLETED | OUTPATIENT
Start: 2020-08-14 | End: 2020-08-14

## 2020-08-14 RX ADMIN — Medication 0.2 MILLIGRAM(S): at 11:02

## 2020-08-14 RX ADMIN — Medication 0.25 MILLIGRAM(S): at 21:34

## 2020-08-14 RX ADMIN — FAMOTIDINE 20 MILLIGRAM(S): 10 INJECTION INTRAVENOUS at 21:34

## 2020-08-14 RX ADMIN — QUETIAPINE FUMARATE 100 MILLIGRAM(S): 200 TABLET, FILM COATED ORAL at 21:34

## 2020-08-14 RX ADMIN — Medication 25 MILLIGRAM(S): at 21:34

## 2020-08-15 RX ADMIN — AMLODIPINE BESYLATE 10 MILLIGRAM(S): 2.5 TABLET ORAL at 08:55

## 2020-08-15 RX ADMIN — ESCITALOPRAM OXALATE 10 MILLIGRAM(S): 10 TABLET, FILM COATED ORAL at 08:55

## 2020-08-15 RX ADMIN — FAMOTIDINE 20 MILLIGRAM(S): 10 INJECTION INTRAVENOUS at 21:12

## 2020-08-15 RX ADMIN — FAMOTIDINE 20 MILLIGRAM(S): 10 INJECTION INTRAVENOUS at 08:55

## 2020-08-15 RX ADMIN — Medication 25 MILLIGRAM(S): at 21:12

## 2020-08-15 RX ADMIN — Medication 1 TABLET(S): at 08:55

## 2020-08-15 RX ADMIN — QUETIAPINE FUMARATE 100 MILLIGRAM(S): 200 TABLET, FILM COATED ORAL at 21:12

## 2020-08-15 RX ADMIN — Medication 25 MILLIGRAM(S): at 08:55

## 2020-08-15 RX ADMIN — Medication 75 MICROGRAM(S): at 07:19

## 2020-08-15 RX ADMIN — Medication 0.25 MILLIGRAM(S): at 21:12

## 2020-08-16 RX ADMIN — Medication 25 MILLIGRAM(S): at 08:51

## 2020-08-16 RX ADMIN — FAMOTIDINE 20 MILLIGRAM(S): 10 INJECTION INTRAVENOUS at 20:24

## 2020-08-16 RX ADMIN — ESCITALOPRAM OXALATE 10 MILLIGRAM(S): 10 TABLET, FILM COATED ORAL at 08:51

## 2020-08-16 RX ADMIN — Medication 1 TABLET(S): at 08:51

## 2020-08-16 RX ADMIN — AMLODIPINE BESYLATE 10 MILLIGRAM(S): 2.5 TABLET ORAL at 08:51

## 2020-08-16 RX ADMIN — Medication 0.25 MILLIGRAM(S): at 20:25

## 2020-08-16 RX ADMIN — FAMOTIDINE 20 MILLIGRAM(S): 10 INJECTION INTRAVENOUS at 08:51

## 2020-08-16 RX ADMIN — QUETIAPINE FUMARATE 100 MILLIGRAM(S): 200 TABLET, FILM COATED ORAL at 20:24

## 2020-08-16 RX ADMIN — Medication 75 MICROGRAM(S): at 07:19

## 2020-08-16 RX ADMIN — Medication 25 MILLIGRAM(S): at 20:24

## 2020-08-17 PROCEDURE — 90870 ELECTROCONVULSIVE THERAPY: CPT

## 2020-08-17 PROCEDURE — 99232 SBSQ HOSP IP/OBS MODERATE 35: CPT | Mod: 25

## 2020-08-17 RX ORDER — FLUMAZENIL 0.1 MG/ML
0.2 VIAL (ML) INTRAVENOUS ONCE
Refills: 0 | Status: COMPLETED | OUTPATIENT
Start: 2020-08-17 | End: 2020-08-17

## 2020-08-17 RX ADMIN — Medication 1 TABLET(S): at 12:24

## 2020-08-17 RX ADMIN — FAMOTIDINE 20 MILLIGRAM(S): 10 INJECTION INTRAVENOUS at 21:03

## 2020-08-17 RX ADMIN — Medication 25 MILLIGRAM(S): at 21:04

## 2020-08-17 RX ADMIN — ESCITALOPRAM OXALATE 10 MILLIGRAM(S): 10 TABLET, FILM COATED ORAL at 12:24

## 2020-08-17 RX ADMIN — QUETIAPINE FUMARATE 100 MILLIGRAM(S): 200 TABLET, FILM COATED ORAL at 21:04

## 2020-08-17 RX ADMIN — Medication 0.25 MILLIGRAM(S): at 21:03

## 2020-08-17 RX ADMIN — Medication 0.2 MILLIGRAM(S): at 10:11

## 2020-08-18 PROCEDURE — 99232 SBSQ HOSP IP/OBS MODERATE 35: CPT

## 2020-08-18 RX ADMIN — Medication 25 MILLIGRAM(S): at 08:34

## 2020-08-18 RX ADMIN — ESCITALOPRAM OXALATE 10 MILLIGRAM(S): 10 TABLET, FILM COATED ORAL at 08:33

## 2020-08-18 RX ADMIN — Medication 25 MILLIGRAM(S): at 21:18

## 2020-08-18 RX ADMIN — QUETIAPINE FUMARATE 100 MILLIGRAM(S): 200 TABLET, FILM COATED ORAL at 21:18

## 2020-08-18 RX ADMIN — Medication 75 MICROGRAM(S): at 08:34

## 2020-08-18 RX ADMIN — FAMOTIDINE 20 MILLIGRAM(S): 10 INJECTION INTRAVENOUS at 21:18

## 2020-08-18 RX ADMIN — FAMOTIDINE 20 MILLIGRAM(S): 10 INJECTION INTRAVENOUS at 08:33

## 2020-08-18 RX ADMIN — Medication 0.25 MILLIGRAM(S): at 21:18

## 2020-08-18 RX ADMIN — Medication 1 TABLET(S): at 08:34

## 2020-08-18 RX ADMIN — AMLODIPINE BESYLATE 10 MILLIGRAM(S): 2.5 TABLET ORAL at 08:33

## 2020-08-19 PROCEDURE — 99232 SBSQ HOSP IP/OBS MODERATE 35: CPT

## 2020-08-19 PROCEDURE — 90832 PSYTX W PT 30 MINUTES: CPT

## 2020-08-19 RX ORDER — CARBAMAZEPINE 200 MG
100 TABLET ORAL
Refills: 0 | Status: DISCONTINUED | OUTPATIENT
Start: 2020-08-19 | End: 2020-08-19

## 2020-08-19 RX ORDER — CARBAMAZEPINE 200 MG
100 TABLET ORAL
Refills: 0 | Status: DISCONTINUED | OUTPATIENT
Start: 2020-08-19 | End: 2020-08-21

## 2020-08-19 RX ADMIN — ESCITALOPRAM OXALATE 10 MILLIGRAM(S): 10 TABLET, FILM COATED ORAL at 09:12

## 2020-08-19 RX ADMIN — QUETIAPINE FUMARATE 100 MILLIGRAM(S): 200 TABLET, FILM COATED ORAL at 20:20

## 2020-08-19 RX ADMIN — FAMOTIDINE 20 MILLIGRAM(S): 10 INJECTION INTRAVENOUS at 09:12

## 2020-08-19 RX ADMIN — AMLODIPINE BESYLATE 10 MILLIGRAM(S): 2.5 TABLET ORAL at 09:12

## 2020-08-19 RX ADMIN — Medication 75 MICROGRAM(S): at 09:07

## 2020-08-19 RX ADMIN — Medication 100 MILLIGRAM(S): at 20:20

## 2020-08-19 RX ADMIN — Medication 25 MILLIGRAM(S): at 09:07

## 2020-08-19 RX ADMIN — FAMOTIDINE 20 MILLIGRAM(S): 10 INJECTION INTRAVENOUS at 20:20

## 2020-08-19 RX ADMIN — Medication 25 MILLIGRAM(S): at 20:20

## 2020-08-19 RX ADMIN — Medication 1 TABLET(S): at 09:07

## 2020-08-20 PROCEDURE — 99232 SBSQ HOSP IP/OBS MODERATE 35: CPT

## 2020-08-20 RX ADMIN — Medication 75 MICROGRAM(S): at 08:47

## 2020-08-20 RX ADMIN — Medication 1 TABLET(S): at 08:47

## 2020-08-20 RX ADMIN — AMLODIPINE BESYLATE 10 MILLIGRAM(S): 2.5 TABLET ORAL at 08:46

## 2020-08-20 RX ADMIN — ESCITALOPRAM OXALATE 10 MILLIGRAM(S): 10 TABLET, FILM COATED ORAL at 08:47

## 2020-08-20 RX ADMIN — Medication 100 MILLIGRAM(S): at 08:46

## 2020-08-20 RX ADMIN — QUETIAPINE FUMARATE 100 MILLIGRAM(S): 200 TABLET, FILM COATED ORAL at 21:16

## 2020-08-20 RX ADMIN — FAMOTIDINE 20 MILLIGRAM(S): 10 INJECTION INTRAVENOUS at 21:16

## 2020-08-20 RX ADMIN — Medication 100 MILLIGRAM(S): at 21:16

## 2020-08-20 RX ADMIN — FAMOTIDINE 20 MILLIGRAM(S): 10 INJECTION INTRAVENOUS at 08:47

## 2020-08-21 PROCEDURE — 99232 SBSQ HOSP IP/OBS MODERATE 35: CPT

## 2020-08-21 RX ORDER — AMLODIPINE BESYLATE 2.5 MG/1
1 TABLET ORAL
Qty: 0 | Refills: 0 | DISCHARGE
Start: 2020-08-21

## 2020-08-21 RX ORDER — CARBAMAZEPINE 200 MG
1 TABLET ORAL
Qty: 0 | Refills: 0 | DISCHARGE
Start: 2020-08-21

## 2020-08-21 RX ORDER — FAMOTIDINE 10 MG/ML
1 INJECTION INTRAVENOUS
Qty: 0 | Refills: 0 | DISCHARGE
Start: 2020-08-21

## 2020-08-21 RX ORDER — QUETIAPINE FUMARATE 200 MG/1
1 TABLET, FILM COATED ORAL
Qty: 0 | Refills: 0 | DISCHARGE
Start: 2020-08-21

## 2020-08-21 RX ORDER — ESCITALOPRAM OXALATE 10 MG/1
1 TABLET, FILM COATED ORAL
Qty: 0 | Refills: 0 | DISCHARGE
Start: 2020-08-21

## 2020-08-21 RX ORDER — CARBAMAZEPINE 200 MG
100 TABLET ORAL DAILY
Refills: 0 | Status: DISCONTINUED | OUTPATIENT
Start: 2020-08-21 | End: 2020-08-25

## 2020-08-21 RX ORDER — LEVOTHYROXINE SODIUM 125 MCG
1 TABLET ORAL
Qty: 0 | Refills: 0 | DISCHARGE
Start: 2020-08-21

## 2020-08-21 RX ORDER — CARBAMAZEPINE 200 MG
200 TABLET ORAL AT BEDTIME
Refills: 0 | Status: DISCONTINUED | OUTPATIENT
Start: 2020-08-21 | End: 2020-08-25

## 2020-08-21 RX ADMIN — Medication 325 MILLIGRAM(S): at 12:39

## 2020-08-21 RX ADMIN — Medication 100 MILLIGRAM(S): at 08:51

## 2020-08-21 RX ADMIN — Medication 75 MICROGRAM(S): at 08:51

## 2020-08-21 RX ADMIN — FAMOTIDINE 20 MILLIGRAM(S): 10 INJECTION INTRAVENOUS at 20:39

## 2020-08-21 RX ADMIN — ESCITALOPRAM OXALATE 10 MILLIGRAM(S): 10 TABLET, FILM COATED ORAL at 08:51

## 2020-08-21 RX ADMIN — Medication 325 MILLIGRAM(S): at 17:49

## 2020-08-21 RX ADMIN — Medication 1 TABLET(S): at 08:51

## 2020-08-21 RX ADMIN — AMLODIPINE BESYLATE 10 MILLIGRAM(S): 2.5 TABLET ORAL at 08:51

## 2020-08-21 RX ADMIN — FAMOTIDINE 20 MILLIGRAM(S): 10 INJECTION INTRAVENOUS at 08:51

## 2020-08-21 RX ADMIN — Medication 200 MILLIGRAM(S): at 20:38

## 2020-08-21 RX ADMIN — QUETIAPINE FUMARATE 100 MILLIGRAM(S): 200 TABLET, FILM COATED ORAL at 20:39

## 2020-08-22 RX ORDER — ACETAMINOPHEN 500 MG
325 TABLET ORAL EVERY 6 HOURS
Refills: 0 | Status: DISCONTINUED | OUTPATIENT
Start: 2020-08-22 | End: 2020-08-23

## 2020-08-22 RX ADMIN — Medication 1 TABLET(S): at 08:50

## 2020-08-22 RX ADMIN — Medication 325 MILLIGRAM(S): at 18:54

## 2020-08-22 RX ADMIN — FAMOTIDINE 20 MILLIGRAM(S): 10 INJECTION INTRAVENOUS at 08:50

## 2020-08-22 RX ADMIN — FAMOTIDINE 20 MILLIGRAM(S): 10 INJECTION INTRAVENOUS at 20:41

## 2020-08-22 RX ADMIN — Medication 325 MILLIGRAM(S): at 15:41

## 2020-08-22 RX ADMIN — Medication 100 MILLIGRAM(S): at 08:50

## 2020-08-22 RX ADMIN — Medication 75 MICROGRAM(S): at 08:50

## 2020-08-22 RX ADMIN — AMLODIPINE BESYLATE 10 MILLIGRAM(S): 2.5 TABLET ORAL at 08:50

## 2020-08-22 RX ADMIN — Medication 200 MILLIGRAM(S): at 20:41

## 2020-08-22 RX ADMIN — ESCITALOPRAM OXALATE 10 MILLIGRAM(S): 10 TABLET, FILM COATED ORAL at 08:50

## 2020-08-22 RX ADMIN — QUETIAPINE FUMARATE 100 MILLIGRAM(S): 200 TABLET, FILM COATED ORAL at 20:41

## 2020-08-23 LAB
ALBUMIN SERPL ELPH-MCNC: 3.4 G/DL — SIGNIFICANT CHANGE UP (ref 3.3–5)
ALP SERPL-CCNC: 56 U/L — SIGNIFICANT CHANGE UP (ref 40–120)
ALT FLD-CCNC: 6 U/L — SIGNIFICANT CHANGE UP (ref 4–33)
ANION GAP SERPL CALC-SCNC: 14 MMO/L — SIGNIFICANT CHANGE UP (ref 7–14)
AST SERPL-CCNC: 9 U/L — SIGNIFICANT CHANGE UP (ref 4–32)
BASOPHILS # BLD AUTO: 0.05 K/UL — SIGNIFICANT CHANGE UP (ref 0–0.2)
BASOPHILS NFR BLD AUTO: 0.6 % — SIGNIFICANT CHANGE UP (ref 0–2)
BILIRUB SERPL-MCNC: 0.3 MG/DL — SIGNIFICANT CHANGE UP (ref 0.2–1.2)
BUN SERPL-MCNC: 13 MG/DL — SIGNIFICANT CHANGE UP (ref 7–23)
CALCIUM SERPL-MCNC: 9.1 MG/DL — SIGNIFICANT CHANGE UP (ref 8.4–10.5)
CHLORIDE SERPL-SCNC: 103 MMOL/L — SIGNIFICANT CHANGE UP (ref 98–107)
CO2 SERPL-SCNC: 24 MMOL/L — SIGNIFICANT CHANGE UP (ref 22–31)
CREAT SERPL-MCNC: 0.92 MG/DL — SIGNIFICANT CHANGE UP (ref 0.5–1.3)
EOSINOPHIL # BLD AUTO: 0.03 K/UL — SIGNIFICANT CHANGE UP (ref 0–0.5)
EOSINOPHIL NFR BLD AUTO: 0.4 % — SIGNIFICANT CHANGE UP (ref 0–6)
GLUCOSE SERPL-MCNC: 99 MG/DL — SIGNIFICANT CHANGE UP (ref 70–99)
HCT VFR BLD CALC: 33.5 % — LOW (ref 34.5–45)
HGB BLD-MCNC: 11.1 G/DL — LOW (ref 11.5–15.5)
IMM GRANULOCYTES NFR BLD AUTO: 0.4 % — SIGNIFICANT CHANGE UP (ref 0–1.5)
LYMPHOCYTES # BLD AUTO: 1.29 K/UL — SIGNIFICANT CHANGE UP (ref 1–3.3)
LYMPHOCYTES # BLD AUTO: 16.2 % — SIGNIFICANT CHANGE UP (ref 13–44)
MAGNESIUM SERPL-MCNC: 1.9 MG/DL — SIGNIFICANT CHANGE UP (ref 1.6–2.6)
MCHC RBC-ENTMCNC: 30.4 PG — SIGNIFICANT CHANGE UP (ref 27–34)
MCHC RBC-ENTMCNC: 33.1 % — SIGNIFICANT CHANGE UP (ref 32–36)
MCV RBC AUTO: 91.8 FL — SIGNIFICANT CHANGE UP (ref 80–100)
MONOCYTES # BLD AUTO: 1.24 K/UL — HIGH (ref 0–0.9)
MONOCYTES NFR BLD AUTO: 15.6 % — HIGH (ref 2–14)
NEUTROPHILS # BLD AUTO: 5.3 K/UL — SIGNIFICANT CHANGE UP (ref 1.8–7.4)
NEUTROPHILS NFR BLD AUTO: 66.8 % — SIGNIFICANT CHANGE UP (ref 43–77)
NRBC # FLD: 0 K/UL — SIGNIFICANT CHANGE UP (ref 0–0)
PHOSPHATE SERPL-MCNC: 2.8 MG/DL — SIGNIFICANT CHANGE UP (ref 2.5–4.5)
PLATELET # BLD AUTO: 332 K/UL — SIGNIFICANT CHANGE UP (ref 150–400)
PMV BLD: 9.4 FL — SIGNIFICANT CHANGE UP (ref 7–13)
POTASSIUM SERPL-MCNC: 3.8 MMOL/L — SIGNIFICANT CHANGE UP (ref 3.5–5.3)
POTASSIUM SERPL-SCNC: 3.8 MMOL/L — SIGNIFICANT CHANGE UP (ref 3.5–5.3)
PROT SERPL-MCNC: 6.6 G/DL — SIGNIFICANT CHANGE UP (ref 6–8.3)
RBC # BLD: 3.65 M/UL — LOW (ref 3.8–5.2)
RBC # FLD: 12.5 % — SIGNIFICANT CHANGE UP (ref 10.3–14.5)
SODIUM SERPL-SCNC: 141 MMOL/L — SIGNIFICANT CHANGE UP (ref 135–145)
WBC # BLD: 7.94 K/UL — SIGNIFICANT CHANGE UP (ref 3.8–10.5)
WBC # FLD AUTO: 7.94 K/UL — SIGNIFICANT CHANGE UP (ref 3.8–10.5)

## 2020-08-23 PROCEDURE — 99233 SBSQ HOSP IP/OBS HIGH 50: CPT

## 2020-08-23 RX ORDER — ACETAMINOPHEN 500 MG
650 TABLET ORAL EVERY 6 HOURS
Refills: 0 | Status: DISCONTINUED | OUTPATIENT
Start: 2020-08-23 | End: 2020-08-25

## 2020-08-23 RX ORDER — DIBUCAINE 1 %
1 OINTMENT (GRAM) RECTAL EVERY 8 HOURS
Refills: 0 | Status: DISCONTINUED | OUTPATIENT
Start: 2020-08-23 | End: 2020-08-25

## 2020-08-23 RX ORDER — PHENYLEPHRINE-SHARK LIVER OIL-MINERAL OIL-PETROLATUM RECTAL OINTMENT
1 OINTMENT (GRAM) RECTAL EVERY 8 HOURS
Refills: 0 | Status: DISCONTINUED | OUTPATIENT
Start: 2020-08-23 | End: 2020-08-23

## 2020-08-23 RX ADMIN — Medication 1 TABLET(S): at 08:36

## 2020-08-23 RX ADMIN — Medication 650 MILLIGRAM(S): at 15:35

## 2020-08-23 RX ADMIN — AMLODIPINE BESYLATE 10 MILLIGRAM(S): 2.5 TABLET ORAL at 08:36

## 2020-08-23 RX ADMIN — Medication 100 MILLIGRAM(S): at 08:36

## 2020-08-23 RX ADMIN — FAMOTIDINE 20 MILLIGRAM(S): 10 INJECTION INTRAVENOUS at 08:36

## 2020-08-23 RX ADMIN — Medication 650 MILLIGRAM(S): at 18:56

## 2020-08-23 RX ADMIN — Medication 200 MILLIGRAM(S): at 20:36

## 2020-08-23 RX ADMIN — Medication 75 MICROGRAM(S): at 08:36

## 2020-08-23 RX ADMIN — QUETIAPINE FUMARATE 100 MILLIGRAM(S): 200 TABLET, FILM COATED ORAL at 20:36

## 2020-08-23 RX ADMIN — FAMOTIDINE 20 MILLIGRAM(S): 10 INJECTION INTRAVENOUS at 20:36

## 2020-08-23 RX ADMIN — ESCITALOPRAM OXALATE 10 MILLIGRAM(S): 10 TABLET, FILM COATED ORAL at 08:36

## 2020-08-23 NOTE — PROGRESS NOTE ADULT - SUBJECTIVE AND OBJECTIVE BOX
CC/Reason for Consult: fever    SUBJECTIVE / OVERNIGHT EVENTS:  Febrile to 101.5 yesterday, tylenol given. Pt denies new symptoms, has been having loose BM, some lower abdominal discomfort that has been present since she developed diverticulitis 7/31 and was transferred to [facility].      MEDICATIONS  (STANDING):  amLODIPine   Tablet 10 milliGRAM(s) Oral daily  carBAMazepine XR Tablet 200 milliGRAM(s) Oral at bedtime  carBAMazepine XR Tablet 100 milliGRAM(s) Oral daily  escitalopram 10 milliGRAM(s) Oral daily  famotidine    Tablet 20 milliGRAM(s) Oral two times a day  lactobacillus acidophilus 1 Tablet(s) Oral daily  levothyroxine 75 MICROGram(s) Oral daily  QUEtiapine 100 milliGRAM(s) Oral at bedtime    MEDICATIONS  (PRN):  acetaminophen   Tablet .. 325 milliGRAM(s) Oral every 6 hours PRN Temp greater or equal to 38C (100.4F), Moderate Pain (4 - 6)  haloperidol     Tablet 2 milliGRAM(s) Oral every 4 hours PRN Anxiety/agitation  haloperidol    Injectable 2 milliGRAM(s) IntraMuscular once PRN Severe agitation      Vital Signs Last 24 Hrs  T(C): 37.6 (23 Aug 2020 05:55), Max: 38.6 (22 Aug 2020 16:03)  T(F): 99.6 (23 Aug 2020 05:55), Max: 101.5 (22 Aug 2020 16:03)  HR: 74  BP: 126/63  BP(mean): --  RR: 15            PHYSICAL EXAM:  GENERAL: NAD, well-developed  HEAD:  Atraumatic, Normocephalic  EYES: EOMI, conjunctiva and sclera clear  NECK: Supple, No JVD  CHEST/LUNG: Clear to auscultation bilaterally; No wheeze  HEART: Regular rate and rhythm; No murmurs, rubs, or gallops  ABDOMEN: Soft, Nontender, Nondistended; Bowel sounds present  EXTREMITIES:  2+ Peripheral Pulses, No clubbing, cyanosis, or edema  PSYCH: AAOx3  NEUROLOGY: non-focal  SKIN: No rashes or lesions    LABS:                        11.1   7.94  )-----------( 332      ( 23 Aug 2020 07:50 )             33.5     08-23    141  |  103  |  13  ----------------------------<  99  3.8   |  24  |  0.92    Ca    9.1      23 Aug 2020 07:50  Phos  2.8     08-23  Mg     1.9     08-23    TPro  6.6  /  Alb  3.4  /  TBili  0.3  /  DBili  x   /  AST  9   /  ALT  6   /  AlkPhos  56  08-23

## 2020-08-23 NOTE — PROGRESS NOTE ADULT - ASSESSMENT
79 F with Hx of bipolar disorder with psychotic features, HTN, recent admission for diverticultis 7/31/20, completed abx 8/10/20    Fever: No localizing source other than possible smoldering diverticulitis.  Clinically well, no leukocytosis.  If Temp>100.8 recurs, would resume cipro 500 bid + flagyl 500 tid and obtain CT abdomen/pelvis with contrast to r/o diverticular abscess.  If patient clinically worsens with abdominal pain and recurrent fever, hypotension, etc, will need transfer to ED for evaluation.       HTN - stable on amlodipine 10    PEYMAN- resolving, creatinine in normal range now (0.92 is slightly above baseline 0.64) however. Continue to encourage PO fluids    Bipolar d/o -management per primary team. Pt on ECT treatment

## 2020-08-24 LAB — SARS-COV-2 RNA SPEC QL NAA+PROBE: SIGNIFICANT CHANGE UP

## 2020-08-24 PROCEDURE — 99232 SBSQ HOSP IP/OBS MODERATE 35: CPT

## 2020-08-24 RX ADMIN — ESCITALOPRAM OXALATE 10 MILLIGRAM(S): 10 TABLET, FILM COATED ORAL at 08:40

## 2020-08-24 RX ADMIN — AMLODIPINE BESYLATE 10 MILLIGRAM(S): 2.5 TABLET ORAL at 08:40

## 2020-08-24 RX ADMIN — Medication 200 MILLIGRAM(S): at 20:40

## 2020-08-24 RX ADMIN — Medication 100 MILLIGRAM(S): at 08:40

## 2020-08-24 RX ADMIN — Medication 650 MILLIGRAM(S): at 21:30

## 2020-08-24 RX ADMIN — QUETIAPINE FUMARATE 100 MILLIGRAM(S): 200 TABLET, FILM COATED ORAL at 20:40

## 2020-08-24 RX ADMIN — FAMOTIDINE 20 MILLIGRAM(S): 10 INJECTION INTRAVENOUS at 08:40

## 2020-08-24 RX ADMIN — Medication 650 MILLIGRAM(S): at 20:39

## 2020-08-24 RX ADMIN — Medication 1 TABLET(S): at 08:40

## 2020-08-24 RX ADMIN — FAMOTIDINE 20 MILLIGRAM(S): 10 INJECTION INTRAVENOUS at 20:40

## 2020-08-24 RX ADMIN — Medication 75 MICROGRAM(S): at 08:40

## 2020-08-25 VITALS — TEMPERATURE: 99 F

## 2020-08-25 PROCEDURE — 99238 HOSP IP/OBS DSCHRG MGMT 30/<: CPT

## 2020-08-25 RX ADMIN — ESCITALOPRAM OXALATE 10 MILLIGRAM(S): 10 TABLET, FILM COATED ORAL at 08:38

## 2020-08-25 RX ADMIN — Medication 75 MICROGRAM(S): at 08:55

## 2020-08-25 RX ADMIN — FAMOTIDINE 20 MILLIGRAM(S): 10 INJECTION INTRAVENOUS at 08:55

## 2020-08-25 RX ADMIN — Medication 1 TABLET(S): at 08:55

## 2020-08-25 RX ADMIN — Medication 100 MILLIGRAM(S): at 08:55

## 2020-08-25 RX ADMIN — AMLODIPINE BESYLATE 10 MILLIGRAM(S): 2.5 TABLET ORAL at 08:55

## 2020-11-04 NOTE — BEHAVIORAL HEALTH ASSESSMENT NOTE - SOURCE OF INFORMATION
Plan:  
 
- Pharm Nuc (Stress test) to further evaluate weakness.  
 
- Your blood pressure was checked for orthostatic hypotension.    
- Results were negative. BP stable and did not decrease with position changes. - Follow up with your PCP re: dizziness. Symptoms appear to be consistent with vertigo. - Be mindful to move and change positions slowly. - Clinical staff will notify you of results of stress test via telephone.  
 
- Follow up with Dr. Zahira Garcia as scheduled. - You may return to office to follow up sooner if symptoms worsen or fail to improve.
Patient

## 2020-12-23 NOTE — PATIENT PROFILE ADULT - NSASFALLATTEMPTOOB_GEN_A_NUR
Medication requested: vilazodone (VIIBRYD) 40 MG TABS tablet  Last refilled: 12-1-20  Qty: 30      Last seen: 12-26-19  RTC: 3 Months  Cancel: 1  No-show: 3  Next appt: 1-21-21    Refill decision:   Refill pended and routed to the provider for review/determination due to 1 cancelled appt, 3 no show,   Last appt 1 Year ago         no

## 2021-08-23 NOTE — DISCHARGE NOTE NURSING/CASE MANAGEMENT/SOCIAL WORK - PATIENT PORTAL LINK FT
[facility]     Date of service:   August 3, 2021     Diagnoses:  1. 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  2. 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   3. History of aortobifemoral graft and bilateral iliac stents, infected aortic graft, and chronic draining sinus left iliac.   4. 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   5. History of duodenal angioectasia.   6. History of GI bleed.   7. History of left femoral graft thrombectomy and left calf compartment syndrome and fasciotomy 02/16/2016.   8. Status post exploratory laparotomy with left hemicolectomy, colostomy, G-tube, J-tube for ischemic colon on 02/28/2016.   9. History of left hip replacement.   10. History of COPD.      History of present illness: 67-year-old male with history of left above-knee amputation, last seen  Nov 3,2020.  Reports vascular status has been stable. He reports history of worsening renal function.   Prosthesis fits well. No new issues with the prosthesis. No skin issues.      Physical exam nation: Left above-knee amputation.  Good hip extension range of motion.  He has distal rubor-has that chronically.  The skin is otherwise unremarkable.  He ambulates with a right straight cane in the left above-knee amputation-transfemoral-prosthesis with a plié 3 microprocessor knee. Seal in liner        Assessment and plan: Left transfemoral amputation-No changed in fit or alignment today. Follow up as needed.       Gordy Bonilla MD     Date of service:   August 3, 2021    Electronically signed by Gordy Bonilla MD on 08/23/2021          
You can access the FollowMyHealth Patient Portal offered by [facility] by registering at the following website: [email or web address]. By joining [employer]’s FollowMyHealth portal, you will also be able to view your health information using other applications (apps) compatible with our system.

## 2021-09-15 ENCOUNTER — INPATIENT (INPATIENT)
Facility: HOSPITAL | Age: 81
LOS: 32 days | Discharge: HOME | End: 2021-10-18
Attending: PSYCHIATRY & NEUROLOGY | Admitting: PSYCHIATRY & NEUROLOGY
Payer: MEDICARE

## 2021-09-15 ENCOUNTER — OUTPATIENT (OUTPATIENT)
Dept: OUTPATIENT SERVICES | Facility: HOSPITAL | Age: 81
LOS: 1 days | Discharge: HOME | End: 2021-09-15
Payer: MEDICARE

## 2021-09-15 VITALS — TEMPERATURE: 98 F | HEIGHT: 60 IN | WEIGHT: 151.9 LBS | RESPIRATION RATE: 19 BRPM

## 2021-09-15 DIAGNOSIS — Z98.891 HISTORY OF UTERINE SCAR FROM PREVIOUS SURGERY: Chronic | ICD-10-CM

## 2021-09-15 DIAGNOSIS — Z90.5 ACQUIRED ABSENCE OF KIDNEY: Chronic | ICD-10-CM

## 2021-09-15 DIAGNOSIS — F32.9 MAJOR DEPRESSIVE DISORDER, SINGLE EPISODE, UNSPECIFIED: ICD-10-CM

## 2021-09-15 PROCEDURE — 99222 1ST HOSP IP/OBS MODERATE 55: CPT

## 2021-09-15 PROCEDURE — 90839 PSYTX CRISIS INITIAL 60 MIN: CPT

## 2021-09-15 RX ORDER — LEVOTHYROXINE SODIUM 125 MCG
75 TABLET ORAL DAILY
Refills: 0 | Status: DISCONTINUED | OUTPATIENT
Start: 2021-09-15 | End: 2021-09-17

## 2021-09-15 RX ORDER — FAMOTIDINE 10 MG/ML
20 INJECTION INTRAVENOUS
Refills: 0 | Status: DISCONTINUED | OUTPATIENT
Start: 2021-09-15 | End: 2021-10-18

## 2021-09-15 RX ORDER — ESCITALOPRAM OXALATE 10 MG/1
10 TABLET, FILM COATED ORAL DAILY
Refills: 0 | Status: DISCONTINUED | OUTPATIENT
Start: 2021-09-15 | End: 2021-10-07

## 2021-09-15 RX ORDER — NORTRIPTYLINE HYDROCHLORIDE 10 MG/1
10 CAPSULE ORAL AT BEDTIME
Refills: 0 | Status: DISCONTINUED | OUTPATIENT
Start: 2021-09-15 | End: 2021-10-07

## 2021-09-15 RX ORDER — CARBAMAZEPINE 200 MG
100 TABLET ORAL DAILY
Refills: 0 | Status: DISCONTINUED | OUTPATIENT
Start: 2021-09-15 | End: 2021-09-17

## 2021-09-15 RX ORDER — HALOPERIDOL DECANOATE 100 MG/ML
5 INJECTION INTRAMUSCULAR EVERY 6 HOURS
Refills: 0 | Status: DISCONTINUED | OUTPATIENT
Start: 2021-09-15 | End: 2021-10-18

## 2021-09-15 RX ORDER — INFLUENZA VIRUS VACCINE 15; 15; 15; 15 UG/.5ML; UG/.5ML; UG/.5ML; UG/.5ML
0.7 SUSPENSION INTRAMUSCULAR ONCE
Refills: 0 | Status: COMPLETED | OUTPATIENT
Start: 2021-09-15 | End: 2021-10-03

## 2021-09-15 RX ORDER — AMLODIPINE BESYLATE 2.5 MG/1
10 TABLET ORAL DAILY
Refills: 0 | Status: DISCONTINUED | OUTPATIENT
Start: 2021-09-15 | End: 2021-10-18

## 2021-09-15 RX ORDER — INFLUENZA VIRUS VACCINE 15; 15; 15; 15 UG/.5ML; UG/.5ML; UG/.5ML; UG/.5ML
0.5 SUSPENSION INTRAMUSCULAR ONCE
Refills: 0 | Status: DISCONTINUED | OUTPATIENT
Start: 2021-09-15 | End: 2021-09-15

## 2021-09-15 RX ORDER — QUETIAPINE FUMARATE 200 MG/1
125 TABLET, FILM COATED ORAL AT BEDTIME
Refills: 0 | Status: DISCONTINUED | OUTPATIENT
Start: 2021-09-15 | End: 2021-10-15

## 2021-09-15 RX ORDER — CARBAMAZEPINE 200 MG
200 TABLET ORAL AT BEDTIME
Refills: 0 | Status: DISCONTINUED | OUTPATIENT
Start: 2021-09-15 | End: 2021-09-16

## 2021-09-15 RX ORDER — HALOPERIDOL DECANOATE 100 MG/ML
5 INJECTION INTRAMUSCULAR ONCE
Refills: 0 | Status: DISCONTINUED | OUTPATIENT
Start: 2021-09-15 | End: 2021-10-18

## 2021-09-15 RX ADMIN — QUETIAPINE FUMARATE 125 MILLIGRAM(S): 200 TABLET, FILM COATED ORAL at 20:31

## 2021-09-15 RX ADMIN — Medication 200 MILLIGRAM(S): at 20:31

## 2021-09-15 RX ADMIN — NORTRIPTYLINE HYDROCHLORIDE 10 MILLIGRAM(S): 10 CAPSULE ORAL at 20:30

## 2021-09-15 RX ADMIN — FAMOTIDINE 20 MILLIGRAM(S): 10 INJECTION INTRAVENOUS at 20:30

## 2021-09-15 NOTE — BH INPATIENT PSYCHIATRY ASSESSMENT NOTE - RISK ASSESSMENT
RISK:  - Modifiable risk factors: Bipolar depression, SI  - Unmodifiable risk factors: female gender, age, multiple inpatient admission, hx of SA by cutting, hx of ETC with good effect, passive SI  - Protective factors: supportive network, help and treatment seeking, future oriented.  - Given above, the patient is clinically appropriate for voluntary admission for inpatient care.

## 2021-09-15 NOTE — BH PATIENT PROFILE - HOME MEDICATIONS
levothyroxine 75 mcg (0.075 mg) oral tablet , 1 tab(s) orally once a day  famotidine 20 mg oral tablet , 1 tab(s) orally 2 times a day  amLODIPine 10 mg oral tablet , 1 tab(s) orally once a day  QUEtiapine 100 mg oral tablet , 1 tab(s) orally once a day (at bedtime)  escitalopram 10 mg oral tablet , 1 tab(s) orally once a day  carBAMazepine 100 mg oral tablet, extended release , 1 tab(s) orally once a day  carBAMazepine 200 mg oral tablet, extended release , 1 tab(s) orally once a day (at bedtime)  lactobacillus acidophilus oral capsule , 1 cap(s) orally once a day or any other probiotics.
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
## 2021-09-15 NOTE — BH INPATIENT PSYCHIATRY ASSESSMENT NOTE - NSBHCHARTREVIEWVS_PSY_A_CORE FT
Vital Signs Last 24 Hrs  T(C): 36.9 (09-15-21 @ 16:33), Max: 36.9 (09-15-21 @ 16:33)  T(F): 98.5 (09-15-21 @ 16:33), Max: 98.5 (09-15-21 @ 16:33)  HR: --  BP: --  BP(mean): --  RR: 19 (09-15-21 @ 16:33) (19 - 19)  SpO2: --    Orthostatic VS  09-15-21 @ 16:33  Lying BP: --/-- HR: --  Sitting BP: 154/62 HR: 74  Standing BP: 151/68 HR: 86  Site: --  Mode: --   Vital Signs Last 24 Hrs  T(C): 36.7 (09-16-21 @ 08:04), Max: 36.9 (09-15-21 @ 16:33)  T(F): 98.1 (09-16-21 @ 08:04), Max: 98.5 (09-15-21 @ 16:33)  HR: --  BP: --  BP(mean): --  RR: 17 (09-16-21 @ 08:04) (17 - 19)  SpO2: --    Orthostatic VS  09-16-21 @ 08:04  Lying BP: --/-- HR: --  Sitting BP: 141/65 HR: 73  Standing BP: 142/69 HR: 77  Site: --  Mode: --  Orthostatic VS  09-15-21 @ 16:33  Lying BP: --/-- HR: --  Sitting BP: 154/62 HR: 74  Standing BP: 151/68 HR: 86  Site: --  Mode: --

## 2021-09-15 NOTE — BH INPATIENT PSYCHIATRY ASSESSMENT NOTE - NSBHASSESSSUMMFT_PSY_ALL_CORE
Patient is a 80-year-old female, , mother, non-caregiver, domiciled alone, in private residence, retired dx of Bipolar I D/O, multiple psychiatric hospitalizations, most recently psychiatrically hospitalized at [facility]: 7/15/20-7/31/20 depression ( discharged medically and readmitted from 8/6/20-8/25/20 for depression, SI and anxiety where she rec’d 10 ECT treatments (no medication changes, was taking Lexapro and Seroquel), history of ECT with good response, currently in outpatient treatment with Dr. Donovan who prescribes Escitalopram 10mg, Amitriptyline 100mg, Nortriptyline 10mg and Seroquel 100mg, one suicide via cutting wrists about one year ago, no history of violence, no legal involvement, no NSSIB and no access to guns/weapons, presents [facility] with her daughter Yen requesting voluntary admission due to worsening sx’s of depression, SI and manic sx’s.  Clinical picture is significant for bipolar depression with worsening symptoms of depression and SI, multiple failed medication trials and recent SI with plan to cut wrist. Patient is seeking admission and is interested in ETC.      1. Admit to 2W, 9.13  2. No CO needed  3. Continue home medications, increase Seroquel dose for insomnia and consider starting ECT  4. Labs ordered for am Patient is a 80-year-old female, , mother, non-caregiver, domiciled alone, in private residence, retired dx of Bipolar I D/O, multiple psychiatric hospitalizations, most recently psychiatrically hospitalized at [facility]: 7/15/20-7/31/20 depression ( discharged medically and readmitted from 8/6/20-8/25/20 for depression, SI and anxiety where she rec’d 10 ECT treatments (no medication changes, was taking Lexapro and Seroquel), history of ECT with good response, currently in outpatient treatment with Dr. Donovan who prescribes Escitalopram 10mg, Amitriptyline 100mg, Nortriptyline 10mg and Seroquel 100mg, one suicide via cutting wrists about one year ago, no history of violence, no legal involvement, no NSSIB and no access to guns/weapons, presents [facility] with her daughter Yen requesting voluntary admission due to worsening sx’s of depression, SI and manic sx’s.  Clinical picture is significant for bipolar depression with worsening symptoms of depression and SI, multiple failed medication trials and recent SI with plan to cut wrist. Patient is seeking admission and is interested in ECT.      1. Admit to 2W, 9.13  2. No CO needed  3. Continue home medications, increase Seroquel dose for insomnia and consider starting ECT  4. Labs ordered for am

## 2021-09-15 NOTE — BH INPATIENT PSYCHIATRY ASSESSMENT NOTE - NSBHMETABOLIC_PSY_ALL_CORE_FT
BMI: BMI (kg/m2): 29.7 (09-15-21 @ 16:33)  HbA1c:   Glucose:   BP: --  Lipid Panel:  BMI: BMI (kg/m2): 29.7 (09-15-21 @ 16:33)  HbA1c: A1C with Estimated Average Glucose Result: 5.4 % (09-16-21 @ 10:37)    Glucose:   BP: --  Lipid Panel: Date/Time: 09-16-21 @ 10:37  Cholesterol, Serum: 241  Direct LDL: --  HDL Cholesterol, Serum: 72  Total Cholesterol/HDL Ration Measurement: --  Triglycerides, Serum: 84

## 2021-09-15 NOTE — BH INPATIENT PSYCHIATRY ASSESSMENT NOTE - CASE SUMMARY
Patient is a 80-year-old female, , mother, non-caregiver, domiciled alone, in private residence, retired dx of Bipolar I D/O, multiple psychiatric hospitalizations, most recently psychiatrically hospitalized at [facility]: 7/15/20-7/31/20 depression ( discharged medically and readmitted from 8/6/20-8/25/20 for depression, SI and anxiety where she rec’d 10 ECT treatments (no medication changes, was taking Lexapro and Seroquel), history of ECT with good response, currently in outpatient treatment with Dr. Donovan who prescribes Escitalopram 10mg, Amitriptyline 100mg, Nortriptyline 10mg and Seroquel 100mg, one suicide via cutting wrists about one year ago, no history of violence, no legal involvement, no NSSIB and no access to guns/weapons, presents [facility] with her daughter Yen requesting voluntary admission due to worsening sx’s of depression, SI and manic sx’s.  Clinical picture is significant for bipolar depression with worsening symptoms of depression and SI, multiple failed medication trials and recent SI with plan to cut wrist. Patient requires psychiatric hospitalization for safety and stabilization with plan to pursue ECT treatment.

## 2021-09-15 NOTE — BH INPATIENT PSYCHIATRY ASSESSMENT NOTE - CURRENT MEDICATION
MEDICATIONS  (STANDING):  amLODIPine   Tablet 10 milliGRAM(s) Oral daily  carBAMazepine 200 milliGRAM(s) Oral at bedtime  carBAMazepine 100 milliGRAM(s) Oral daily  escitalopram 10 milliGRAM(s) Oral daily  famotidine    Tablet 20 milliGRAM(s) Oral two times a day  levothyroxine 75 MICROGram(s) Oral daily  nortriptyline 10 milliGRAM(s) Oral at bedtime  QUEtiapine 125 milliGRAM(s) Oral at bedtime    MEDICATIONS  (PRN):  haloperidol     Tablet 5 milliGRAM(s) Oral every 6 hours PRN agitation  haloperidol    Injectable 5 milliGRAM(s) IntraMuscular once PRN severe agitation  LORazepam     Tablet 1 milliGRAM(s) Oral every 4 hours PRN anxiety  LORazepam   Injectable 2 milliGRAM(s) IntraMuscular once PRN severe agitation   MEDICATIONS  (STANDING):  amLODIPine   Tablet 10 milliGRAM(s) Oral daily  carBAMazepine 100 milliGRAM(s) Oral daily  carBAMazepine 100 milliGRAM(s) Oral at bedtime  escitalopram 10 milliGRAM(s) Oral daily  famotidine    Tablet 20 milliGRAM(s) Oral two times a day  influenza  Vaccine (HIGH DOSE) 0.7 milliLiter(s) IntraMuscular once  levothyroxine 75 MICROGram(s) Oral daily  nortriptyline 10 milliGRAM(s) Oral at bedtime  QUEtiapine 125 milliGRAM(s) Oral at bedtime    MEDICATIONS  (PRN):  haloperidol     Tablet 5 milliGRAM(s) Oral every 6 hours PRN agitation  haloperidol    Injectable 5 milliGRAM(s) IntraMuscular once PRN severe agitation  LORazepam     Tablet 1 milliGRAM(s) Oral every 4 hours PRN anxiety  LORazepam   Injectable 2 milliGRAM(s) IntraMuscular once PRN severe agitation

## 2021-09-16 DIAGNOSIS — F31.9 BIPOLAR DISORDER, UNSPECIFIED: ICD-10-CM

## 2021-09-16 LAB
A1C WITH ESTIMATED AVERAGE GLUCOSE RESULT: 5.4 % — SIGNIFICANT CHANGE UP (ref 4–5.6)
ALBUMIN SERPL ELPH-MCNC: 4.4 G/DL — SIGNIFICANT CHANGE UP (ref 3.3–5)
ALP SERPL-CCNC: 108 U/L — SIGNIFICANT CHANGE UP (ref 40–120)
ALT FLD-CCNC: 20 U/L — SIGNIFICANT CHANGE UP (ref 4–33)
ANION GAP SERPL CALC-SCNC: 12 MMOL/L — SIGNIFICANT CHANGE UP (ref 7–14)
AST SERPL-CCNC: 20 U/L — SIGNIFICANT CHANGE UP (ref 4–32)
BASOPHILS # BLD AUTO: 0.05 K/UL — SIGNIFICANT CHANGE UP (ref 0–0.2)
BASOPHILS NFR BLD AUTO: 1 % — SIGNIFICANT CHANGE UP (ref 0–2)
BILIRUB SERPL-MCNC: 0.3 MG/DL — SIGNIFICANT CHANGE UP (ref 0.2–1.2)
BUN SERPL-MCNC: 11 MG/DL — SIGNIFICANT CHANGE UP (ref 7–23)
CALCIUM SERPL-MCNC: 9.7 MG/DL — SIGNIFICANT CHANGE UP (ref 8.4–10.5)
CHLORIDE SERPL-SCNC: 97 MMOL/L — LOW (ref 98–107)
CHOLEST SERPL-MCNC: 241 MG/DL — HIGH
CO2 SERPL-SCNC: 25 MMOL/L — SIGNIFICANT CHANGE UP (ref 22–31)
COVID-19 SPIKE DOMAIN AB INTERP: POSITIVE
COVID-19 SPIKE DOMAIN ANTIBODY RESULT: >250 U/ML — HIGH
CREAT SERPL-MCNC: 0.76 MG/DL — SIGNIFICANT CHANGE UP (ref 0.5–1.3)
EOSINOPHIL # BLD AUTO: 0.07 K/UL — SIGNIFICANT CHANGE UP (ref 0–0.5)
EOSINOPHIL NFR BLD AUTO: 1.4 % — SIGNIFICANT CHANGE UP (ref 0–6)
ESTIMATED AVERAGE GLUCOSE: 108 — SIGNIFICANT CHANGE UP
GLUCOSE SERPL-MCNC: 96 MG/DL — SIGNIFICANT CHANGE UP (ref 70–99)
HCG SERPL-ACNC: <5 MIU/ML — SIGNIFICANT CHANGE UP
HCT VFR BLD CALC: 38.7 % — SIGNIFICANT CHANGE UP (ref 34.5–45)
HDLC SERPL-MCNC: 72 MG/DL — SIGNIFICANT CHANGE UP
HGB BLD-MCNC: 12.7 G/DL — SIGNIFICANT CHANGE UP (ref 11.5–15.5)
IANC: 2.51 K/UL — SIGNIFICANT CHANGE UP (ref 1.5–8.5)
IMM GRANULOCYTES NFR BLD AUTO: 0.2 % — SIGNIFICANT CHANGE UP (ref 0–1.5)
LIPID PNL WITH DIRECT LDL SERPL: 152 MG/DL — HIGH
LYMPHOCYTES # BLD AUTO: 1.77 K/UL — SIGNIFICANT CHANGE UP (ref 1–3.3)
LYMPHOCYTES # BLD AUTO: 36.1 % — SIGNIFICANT CHANGE UP (ref 13–44)
MCHC RBC-ENTMCNC: 31.1 PG — SIGNIFICANT CHANGE UP (ref 27–34)
MCHC RBC-ENTMCNC: 32.8 GM/DL — SIGNIFICANT CHANGE UP (ref 32–36)
MCV RBC AUTO: 94.6 FL — SIGNIFICANT CHANGE UP (ref 80–100)
MONOCYTES # BLD AUTO: 0.49 K/UL — SIGNIFICANT CHANGE UP (ref 0–0.9)
MONOCYTES NFR BLD AUTO: 10 % — SIGNIFICANT CHANGE UP (ref 2–14)
NEUTROPHILS # BLD AUTO: 2.51 K/UL — SIGNIFICANT CHANGE UP (ref 1.8–7.4)
NEUTROPHILS NFR BLD AUTO: 51.3 % — SIGNIFICANT CHANGE UP (ref 43–77)
NON HDL CHOLESTEROL: 169 MG/DL — HIGH
NRBC # BLD: 0 /100 WBCS — SIGNIFICANT CHANGE UP
NRBC # FLD: 0 K/UL — SIGNIFICANT CHANGE UP
PLATELET # BLD AUTO: 384 K/UL — SIGNIFICANT CHANGE UP (ref 150–400)
POTASSIUM SERPL-MCNC: 4.3 MMOL/L — SIGNIFICANT CHANGE UP (ref 3.5–5.3)
POTASSIUM SERPL-SCNC: 4.3 MMOL/L — SIGNIFICANT CHANGE UP (ref 3.5–5.3)
PROT SERPL-MCNC: 7.5 G/DL — SIGNIFICANT CHANGE UP (ref 6–8.3)
RBC # BLD: 4.09 M/UL — SIGNIFICANT CHANGE UP (ref 3.8–5.2)
RBC # FLD: 13.2 % — SIGNIFICANT CHANGE UP (ref 10.3–14.5)
SARS-COV-2 IGG+IGM SERPL QL IA: >250 U/ML — HIGH
SARS-COV-2 IGG+IGM SERPL QL IA: POSITIVE
SODIUM SERPL-SCNC: 134 MMOL/L — LOW (ref 135–145)
TRIGL SERPL-MCNC: 84 MG/DL — SIGNIFICANT CHANGE UP
TSH SERPL-MCNC: 2.28 UIU/ML — SIGNIFICANT CHANGE UP (ref 0.27–4.2)
WBC # BLD: 4.9 K/UL — SIGNIFICANT CHANGE UP (ref 3.8–10.5)
WBC # FLD AUTO: 4.9 K/UL — SIGNIFICANT CHANGE UP (ref 3.8–10.5)

## 2021-09-16 PROCEDURE — 99232 SBSQ HOSP IP/OBS MODERATE 35: CPT

## 2021-09-16 RX ORDER — CARBAMAZEPINE 200 MG
100 TABLET ORAL AT BEDTIME
Refills: 0 | Status: DISCONTINUED | OUTPATIENT
Start: 2021-09-16 | End: 2021-09-17

## 2021-09-16 RX ADMIN — Medication 100 MILLIGRAM(S): at 10:24

## 2021-09-16 RX ADMIN — AMLODIPINE BESYLATE 10 MILLIGRAM(S): 2.5 TABLET ORAL at 10:25

## 2021-09-16 RX ADMIN — NORTRIPTYLINE HYDROCHLORIDE 10 MILLIGRAM(S): 10 CAPSULE ORAL at 20:40

## 2021-09-16 RX ADMIN — QUETIAPINE FUMARATE 125 MILLIGRAM(S): 200 TABLET, FILM COATED ORAL at 20:40

## 2021-09-16 RX ADMIN — Medication 75 MICROGRAM(S): at 10:24

## 2021-09-16 RX ADMIN — ESCITALOPRAM OXALATE 10 MILLIGRAM(S): 10 TABLET, FILM COATED ORAL at 10:25

## 2021-09-16 RX ADMIN — FAMOTIDINE 20 MILLIGRAM(S): 10 INJECTION INTRAVENOUS at 20:39

## 2021-09-16 RX ADMIN — Medication 100 MILLIGRAM(S): at 20:40

## 2021-09-16 RX ADMIN — FAMOTIDINE 20 MILLIGRAM(S): 10 INJECTION INTRAVENOUS at 10:24

## 2021-09-16 NOTE — BH INPATIENT PSYCHIATRY PROGRESS NOTE - NSBHCHARTREVIEWVS_PSY_A_CORE FT
Vital Signs Last 24 Hrs  T(C): 36.7 (09-16-21 @ 08:04), Max: 36.9 (09-15-21 @ 16:33)  T(F): 98.1 (09-16-21 @ 08:04), Max: 98.5 (09-15-21 @ 16:33)  HR: --  BP: --  BP(mean): --  RR: 17 (09-16-21 @ 08:04) (17 - 19)  SpO2: --    Orthostatic VS  09-16-21 @ 08:04  Lying BP: --/-- HR: --  Sitting BP: 141/65 HR: 73  Standing BP: 142/69 HR: 77  Site: --  Mode: --  Orthostatic VS  09-15-21 @ 16:33  Lying BP: --/-- HR: --  Sitting BP: 154/62 HR: 74  Standing BP: 151/68 HR: 86  Site: --  Mode: --

## 2021-09-16 NOTE — BH INPATIENT PSYCHIATRY PROGRESS NOTE - NSBHASSESSSUMMFT_PSY_ALL_CORE
Patient is a 80-year-old female, , mother, non-caregiver, domiciled alone, in private residence, retired dx of Bipolar I D/O, multiple psychiatric hospitalizations, most recently psychiatrically hospitalized at [facility]: 7/15/20-7/31/20 depression ( discharged medically and readmitted from 8/6/20-8/25/20 for depression, SI and anxiety where she rec’d 10 ECT treatments (no medication changes, was taking Lexapro and Seroquel), history of ECT with good response, currently in outpatient treatment with Dr. Donovan who prescribes Escitalopram 10mg, Amitriptyline 100mg, Nortriptyline 10mg and Seroquel 100mg, one suicide via cutting wrists about one year ago, no history of violence, no legal involvement, no NSSIB and no access to guns/weapons, presents [facility] with her daughter Yen requesting voluntary admission due to worsening sx’s of depression, SI and manic sx’s.  Clinical picture is significant for bipolar depression with worsening symptoms of depression and SI, multiple failed medication trials and recent SI with plan to cut wrist. Patient is seeking admission and is interested in ETC.     Patient remains depressed and endorses passive SI. She is isolative to her room, endorses low energy and amotivation. She is to be seen for ECT eval and Dental consult ordered as well for ECT clearance.     c/w Lexapro, Nortriptyline, Seroquel.  Taper off Tegretol as per ECT.

## 2021-09-16 NOTE — PSYCHIATRIC REHAB INITIAL EVALUATION - NSBHPRRECOMMEND_PSY_ALL_CORE
Writer met with Pt. to orient her to psychiatric rehabilitation staff and services. Pt. Presented with a sad mood and a congruent affect. Throughout the session, the patient was a reliable historian, receptive, and forthcoming with personal hx. Patient identified her reason for admission as increased suicidal ideation. Pt. reported that she has no history of SI in the past but recently she has been having these thoughts every couple of days. Pt. reports not having a plan of intent "but the thought had been lingering". Pt states that she has been hospitalized before due to her hx of being Bipolar. Pt. denies AH and VH. Pt. stated that her reason for living would be her kids with which she has "a good relationship with". Psychiatric rehabilitation staff and Pt. established a collaborative treatment goal for the patient to work on over the next 7 days. Patient expressed interest in DBT groups and ETC resources to support her recovery upon discharge. Psychiatric rehabilitation staff will encourage exploration of these resources and provide the necessary information.

## 2021-09-16 NOTE — BH INPATIENT PSYCHIATRY PROGRESS NOTE - NSBHFUPINTERVALHXFT_PSY_A_CORE
Chart reviewed and case discussed with treatment team. No events reported overnight. Sleep and appetite is poor. Patient stated that she feels "alright" and endorses passive SI. Patient is out for meals but mostly keeps to herself in her room. Patient is agreeable to start ECT, consult ordered. Patient reported that she had a dental procedure done to put in posts. Dental consult ordered to assess if posts were placed. Patient is compliant with medications, no adverse effects reported.

## 2021-09-16 NOTE — CHART NOTE - NSCHARTNOTEFT_GEN_A_CORE
Screening Medical Evaluation  Patient Admitted from: Crisis Clinic    [facility] admitting diagnosis: Major depressive disorder with single episode    PAST MEDICAL & SURGICAL HISTORY:  Hypertension    Suicidal ideation    Hypothyroid    Psychiatric disorder  bipolar disorder with psychotic features with depression and hx of SI/HI with multiple [facility] hospitalizations    H/O right nephrectomy    History of  delivery          Allergies    No Known Allergies    Intolerances        Social History:     FAMILY HISTORY:  FH: heart attack    FH: breast cancer        MEDICATIONS  (STANDING):  amLODIPine   Tablet 10 milliGRAM(s) Oral daily  carBAMazepine 200 milliGRAM(s) Oral at bedtime  carBAMazepine 100 milliGRAM(s) Oral daily  escitalopram 10 milliGRAM(s) Oral daily  famotidine    Tablet 20 milliGRAM(s) Oral two times a day  influenza  Vaccine (HIGH DOSE) 0.7 milliLiter(s) IntraMuscular once  levothyroxine 75 MICROGram(s) Oral daily  nortriptyline 10 milliGRAM(s) Oral at bedtime  QUEtiapine 125 milliGRAM(s) Oral at bedtime    MEDICATIONS  (PRN):  haloperidol     Tablet 5 milliGRAM(s) Oral every 6 hours PRN agitation  haloperidol    Injectable 5 milliGRAM(s) IntraMuscular once PRN severe agitation  LORazepam     Tablet 1 milliGRAM(s) Oral every 4 hours PRN anxiety  LORazepam   Injectable 2 milliGRAM(s) IntraMuscular once PRN severe agitation      Vital Signs Last 24 Hrs  T(C): 36.9 (15 Sep 2021 16:33), Max: 36.9 (15 Sep 2021 16:33)  T(F): 98.5 (15 Sep 2021 16:33), Max: 98.5 (15 Sep 2021 16:33)  HR: 74  BP: 154/62  RR: 19 (15 Sep 2021 16:33) (19 - 19)  SpO2: --  CAPILLARY BLOOD GLUCOSE            PHYSICAL EXAM:  GENERAL: NAD, well-developed  HEAD:  Atraumatic, Normocephalic  EYES: EOMI, PERRLA, conjunctiva and sclera clear  NECK: Supple.  CHEST/LUNG: Clear to auscultation bilaterally; No wheeze  HEART: Regular rate and rhythm; No murmurs, rubs, or gallops  ABDOMEN: Soft, Nontender, Nondistended; Bowel sounds present  EXTREMITIES:  2+ Peripheral Pulses, No cyanosis, or edema  PSYCH: AAOx3  NEUROLOGY: non-focal  SKIN: No rashes or lesions    LABS:                    RADIOLOGY & ADDITIONAL TESTS:    Assessment and Plan:  80 year old female presenting today from Crisis Clinic to [facility] with admitting diagnosis of Major depressive disorder with single episode with PMH of HTN, GERD, hypothyroidism. Denies any medical concerns at this time. Denies any fever, chills, headache, chest pain, SOB, abdominal pain, N/V/D/C, dysuria. Physical exam unremarkable.  1) HTN: Continue amlodipine 10mg oral daily.  2) Hypothyroidism: Continue synthroid 75mcg oral daily.  3) GERD: Continue Pepcid 20mg BID.  4)  Major depressive disorder with single episode: Follow care plan as per team.

## 2021-09-17 PROCEDURE — 99232 SBSQ HOSP IP/OBS MODERATE 35: CPT

## 2021-09-17 RX ORDER — CARBAMAZEPINE 200 MG
100 TABLET ORAL DAILY
Refills: 0 | Status: COMPLETED | OUTPATIENT
Start: 2021-09-18 | End: 2021-09-18

## 2021-09-17 RX ORDER — LEVOTHYROXINE SODIUM 125 MCG
75 TABLET ORAL
Refills: 0 | Status: DISCONTINUED | OUTPATIENT
Start: 2021-09-17 | End: 2021-10-18

## 2021-09-17 RX ADMIN — Medication 100 MILLIGRAM(S): at 08:40

## 2021-09-17 RX ADMIN — AMLODIPINE BESYLATE 10 MILLIGRAM(S): 2.5 TABLET ORAL at 08:38

## 2021-09-17 RX ADMIN — FAMOTIDINE 20 MILLIGRAM(S): 10 INJECTION INTRAVENOUS at 08:38

## 2021-09-17 RX ADMIN — QUETIAPINE FUMARATE 125 MILLIGRAM(S): 200 TABLET, FILM COATED ORAL at 20:30

## 2021-09-17 RX ADMIN — Medication 75 MICROGRAM(S): at 10:05

## 2021-09-17 RX ADMIN — NORTRIPTYLINE HYDROCHLORIDE 10 MILLIGRAM(S): 10 CAPSULE ORAL at 20:30

## 2021-09-17 RX ADMIN — Medication 10 MILLIGRAM(S): at 15:03

## 2021-09-17 RX ADMIN — FAMOTIDINE 20 MILLIGRAM(S): 10 INJECTION INTRAVENOUS at 20:30

## 2021-09-17 RX ADMIN — ESCITALOPRAM OXALATE 10 MILLIGRAM(S): 10 TABLET, FILM COATED ORAL at 08:38

## 2021-09-17 NOTE — ECT CONSULT NOTE - NSECTMENTALSTATUSEXAM_PSY_ALL_CORE
PT is A, Ox3, cooperative. Speech is fluent, low productivity. Thought is linear, no delusions, denies SI. No perceptual disturbances. Mood is "a little down", affect is restricted. Insight and judgment are fair

## 2021-09-17 NOTE — BH INPATIENT PSYCHIATRY PROGRESS NOTE - NSBHCHARTREVIEWVS_PSY_A_CORE FT
Vital Signs Last 24 Hrs  T(C): 36.9 (09-17-21 @ 07:33), Max: 36.9 (09-17-21 @ 07:33)  T(F): 98.5 (09-17-21 @ 07:33), Max: 98.5 (09-17-21 @ 07:33)  HR: --  BP: --  BP(mean): --  RR: 16 (09-17-21 @ 07:33) (16 - 16)  SpO2: --    Orthostatic VS  09-17-21 @ 07:33  Lying BP: --/-- HR: --  Sitting BP: 162/64 HR: 79  Standing BP: 155/63 HR: 95  Site: --  Mode: --  Orthostatic VS  09-16-21 @ 08:04  Lying BP: --/-- HR: --  Sitting BP: 141/65 HR: 73  Standing BP: 142/69 HR: 77  Site: --  Mode: --  Orthostatic VS  09-15-21 @ 16:33  Lying BP: --/-- HR: --  Sitting BP: 154/62 HR: 74  Standing BP: 151/68 HR: 86  Site: --  Mode: --

## 2021-09-17 NOTE — ECT CONSULT NOTE - CURRENT MEDICATION
MEDICATIONS  (STANDING):  amLODIPine   Tablet 10 milliGRAM(s) Oral daily  escitalopram 10 milliGRAM(s) Oral daily  famotidine    Tablet 20 milliGRAM(s) Oral two times a day  influenza  Vaccine (HIGH DOSE) 0.7 milliLiter(s) IntraMuscular once  levothyroxine 75 MICROGram(s) Oral <User Schedule>  nortriptyline 10 milliGRAM(s) Oral at bedtime  QUEtiapine 125 milliGRAM(s) Oral at bedtime    MEDICATIONS  (PRN):  haloperidol     Tablet 5 milliGRAM(s) Oral every 6 hours PRN agitation  haloperidol    Injectable 5 milliGRAM(s) IntraMuscular once PRN severe agitation  LORazepam     Tablet 1 milliGRAM(s) Oral every 4 hours PRN anxiety  LORazepam   Injectable 2 milliGRAM(s) IntraMuscular once PRN severe agitation

## 2021-09-17 NOTE — BH INPATIENT PSYCHIATRY PROGRESS NOTE - NSBHASSESSSUMMFT_PSY_ALL_CORE
Patient is a 80-year-old female, , mother, non-caregiver, domiciled alone, in private residence, retired dx of Bipolar I D/O, multiple psychiatric hospitalizations, most recently psychiatrically hospitalized at [facility]: 7/15/20-7/31/20 depression ( discharged medically and readmitted from 8/6/20-8/25/20 for depression, SI and anxiety where she rec’d 10 ECT treatments (no medication changes, was taking Lexapro and Seroquel), history of ECT with good response, currently in outpatient treatment with Dr. Donovan who prescribes Escitalopram 10mg, Amitriptyline 100mg, Nortriptyline 10mg and Seroquel 100mg, one suicide via cutting wrists about one year ago, no history of violence, no legal involvement, no NSSIB and no access to guns/weapons, presents [facility] with her daughter Yen requesting voluntary admission due to worsening sx’s of depression, SI and manic sx’s.  Clinical picture is significant for bipolar depression with worsening symptoms of depression and SI, multiple failed medication trials and recent SI with plan to cut wrist. Patient is seeking admission and is interested in ETC.     Patient remains depressed with poor sleep and appetite. She denies any current SI but remains mostly isolative to her room. She is to be seen by Dental for ECT clearance.     c/w Lexapro, Nortriptyline, Seroquel.  Taper off Tegretol as per ECT.

## 2021-09-17 NOTE — ECT CONSULT NOTE - OTHER PAST PSYCHIATRIC HISTORY (INCLUDE DETAILS REGARDING ONSET, COURSE OF ILLNESS, INPATIENT/OUTPATIENT TREATMENT)
Patient is a 80-year-old female, , mother, non-caregiver, domiciled alone, in private residence, retired dx of Bipolar I D/O, multiple psychiatric hospitalizations, most recently psychiatrically hospitalized at [facility]: 7/15/20-7/31/20 depression ( discharged medically and readmitted from 8/6/20-8/25/20 for depression, SI and anxiety where she rec’d 10 ECT treatments (no medication changes, was taking Lexapro and Seroquel), history of ECT with good response, currently in outpatient treatment with Dr. Donovan who prescribes Escitalopram 10mg, Amitriptyline 100mg, Nortriptyline 10mg and Seroquel 100mg, one suicide via cutting wrists about one year ago, no history of violence, no legal involvement, no NSSIB and no access to guns/weapons, presents [facility] with her daughter Yen requesting voluntary admission due to worsening sx’s of depression, SI.  Clinical picture is significant for bipolar depression with worsening symptoms of depression and SI, multiple failed medications. ECT about 40yrs ago and most recently in Aug 2020 with good effect.  Today pt states that she feels down and anxious, denies SI. Is eager to resume ECT>  MMSE: 29/30

## 2021-09-17 NOTE — BH SOCIAL WORK INITIAL PSYCHOSOCIAL EVALUATION - OTHER PAST PSYCHIATRIC HISTORY (INCLUDE DETAILS REGARDING ONSET, COURSE OF ILLNESS, INPATIENT/OUTPATIENT TREATMENT)
Pt has hx of multiple hospitalizations; last in [facility] 07/2020 and 08/2020

## 2021-09-17 NOTE — BH INPATIENT PSYCHIATRY PROGRESS NOTE - NSBHFUPINTERVALHXFT_PSY_A_CORE
Chart reviewed and case discussed with treatment team. No events reported overnight. Sleep is "a little better" and appetite is "terrible". Patient is out on the unit at times but keeps to herself. She endorses feeling better and denies any SI but still has depressive affect. She complained of constipation, no BM x2 days, dulcolax 10mg ordered. Patient is waiting for dental clearance to start ECT. Patient is compliant with medications, no adverse effects reported.

## 2021-09-17 NOTE — DIETITIAN INITIAL EVALUATION ADULT. - OTHER INFO
Patient admitted to [facility] d/t SI. As per medical record, patient endorses decreased appetite since starting Losartan two weeks PTA. To have ECT consult. Patient states appetite is "terrible". However, states is consuming meals. Complained of not having a bm in 3 days. Will add prune juice to breakfast meals, encouraged increased fiber and fluid intake. Nursing made aware of no recent bm. Has 2 teeth missing on top but denies difficulty chewing. Patient states she may have lost some weight, is unsure. Food preferences obtained and implemented. Discussed healthy diet d/t elevated lipid levels.

## 2021-09-18 LAB — GLUCOSE BLDC GLUCOMTR-MCNC: 129 MG/DL — HIGH (ref 70–99)

## 2021-09-18 PROCEDURE — 99231 SBSQ HOSP IP/OBS SF/LOW 25: CPT

## 2021-09-18 PROCEDURE — 93010 ELECTROCARDIOGRAM REPORT: CPT

## 2021-09-18 RX ADMIN — Medication 75 MICROGRAM(S): at 06:39

## 2021-09-18 RX ADMIN — QUETIAPINE FUMARATE 125 MILLIGRAM(S): 200 TABLET, FILM COATED ORAL at 23:24

## 2021-09-18 RX ADMIN — Medication 100 MILLIGRAM(S): at 10:42

## 2021-09-18 RX ADMIN — NORTRIPTYLINE HYDROCHLORIDE 10 MILLIGRAM(S): 10 CAPSULE ORAL at 23:23

## 2021-09-18 RX ADMIN — FAMOTIDINE 20 MILLIGRAM(S): 10 INJECTION INTRAVENOUS at 09:43

## 2021-09-18 RX ADMIN — ESCITALOPRAM OXALATE 10 MILLIGRAM(S): 10 TABLET, FILM COATED ORAL at 09:43

## 2021-09-18 RX ADMIN — FAMOTIDINE 20 MILLIGRAM(S): 10 INJECTION INTRAVENOUS at 23:23

## 2021-09-18 RX ADMIN — AMLODIPINE BESYLATE 10 MILLIGRAM(S): 2.5 TABLET ORAL at 09:43

## 2021-09-18 NOTE — BH INPATIENT PSYCHIATRY PROGRESS NOTE - NSBHASSESSSUMMFT_PSY_ALL_CORE
Patient is a 80-year-old female, , mother, non-caregiver, domiciled alone, in private residence, retired dx of Bipolar I D/O, multiple psychiatric hospitalizations, most recently psychiatrically hospitalized at [facility]: 7/15/20-7/31/20 depression ( discharged medically and readmitted from 8/6/20-8/25/20 for depression, SI and anxiety where she rec’d 10 ECT treatments (no medication changes, was taking Lexapro and Seroquel), history of ECT with good response, currently in outpatient treatment with Dr. Donovan who prescribes Escitalopram 10mg, Amitriptyline 100mg, Nortriptyline 10mg and Seroquel 100mg, one suicide via cutting wrists about one year ago, no history of violence, no legal involvement, no NSSIB and no access to guns/weapons, presents [facility] with her daughter Yen requesting voluntary admission due to worsening sx’s of depression, SI and manic sx’s.  Clinical picture is significant for bipolar depression with worsening symptoms of depression and SI, multiple failed medication trials and recent SI with plan to cut wrist. Patient is seeking admission and is interested in ETC.     Continues to appear depressed and isolated.    c/w Lexapro, Nortriptyline, Seroquel.  Taper off Tegretol as per ECT.

## 2021-09-18 NOTE — BH CHART NOTE - NSEVENTNOTEFT_PSY_ALL_CORE
Interval History:  OLIVIA paged at 4:04 am as patient became hypotensive (90s/30s) after straining to use the bathroom (pt had been constipated for last few days). Upon initial evaluation, the patient was seen lying on the bathroom floor, complaining of dizziness and nausea. No LOC, no head trauma. Staff encouraged to provide oral rehydration.  *MEDICAL RAPID RESPONSE @ 4:30 am* as staff witnessed brief period of LOC (lasting a few seconds) with some drooling (while pt was sitting upright). Patient re-examined, VSS (BP improved, 114s/70s after oral hydration). AAOx3. Staff encouraged to continue providing oral rehydration. Hypotensive episode with LOC likely in the context of straining, dehydration.  PA made aware and evaluated.      T(C): 37.1 (09-17-21 @ 17:58), Max: 37.1 (09-17-21 @ 17:58)  HR: --  BP: --  RR: 16 (09-17-21 @ 07:33) (16 - 16)  SpO2: --    Physical Exam:  Gen: Patient sitting on chair in bathroom, NAD   HEENT: NC/AT,  EOMI.    Ext: ROM intact, no clubbing, edema, or cyanosis.   Neuro: awake, alert, grossly oriented. CNII-XII intact. 5/5 strength in all 4 extremities.     Assessment:  OLIVIA paged at 4:04 am as patient became hypotensive (90s/30s) after straining to use the bathroom (pt had been constipated for last few days and just used bowel regimen). Upon initial evaluation, the patient was seen lying on the bathroom floor, complaining of dizziness and nausea. No LOC. Staff encouraged to provide oral rehydration.  *MEDICAL RAPID RESPONSE @ 4:30 am* for witnessed brief period of LOC (lasting a few seconds)  with some drooling (while pt was sitting upright). Patient re-examined, VSS (BP improved, 114s/70s after oral hydration). She was AAOx3, able to speak in full sentences. She states her dizziness had improved. Staff encouraged to continue providing oral rehydration. Hypotensive episode with LOC likely in the context of straining, dehydration.  Pt clinically stable with exam otherwise unremarkable.     Plan:  1. no further medical intervention necessary at this time.   2. will continue to monitor routinely.   3. d/w RN staff, if pt becomes clinically unstable, will recommend ED transfer.    Interval History:  OLIVIA paged at 4:04 am as patient became hypotensive (90s/30s) after straining to use the bathroom (pt had been constipated for last few days). Upon initial evaluation, the patient was seen lying on the bathroom floor, complaining of dizziness and nausea. No LOC, no head trauma. Staff encouraged to provide oral rehydration. *MEDICAL RAPID RESPONSE @ 4:30 am* as staff witnessed brief period of LOC (lasting a few seconds) with some drooling (while pt was sitting upright). Pt does not recall episode of LOC, states that her dizziness has improved and reports physically overall feeling better.       T(C): 37.1 (09-17-21 @ 17:58), Max: 37.1 (09-17-21 @ 17:58)  HR: --  BP: --  RR: 16 (09-17-21 @ 07:33) (16 - 16)  SpO2: --    Physical Exam:  Gen: Patient sitting on chair in bathroom, NAD   HEENT: NC/AT,  EOMI.    Ext: ROM intact, no clubbing, edema, or cyanosis.   Neuro: awake, alert, grossly oriented. CNII-XII intact. 5/5 strength in all 4 extremities.     Assessment:  OLIVIA paged at 4:04 am as patient became hypotensive (90s/30s) after straining to use the bathroom (pt had been constipated for last few days and just used bowel regimen). Upon initial evaluation, the patient was seen lying on the bathroom floor, complaining of dizziness and nausea. No LOC. Staff encouraged to provide oral rehydration.  *MEDICAL RAPID RESPONSE @ 4:30 am* for witnessed brief period of LOC (lasting a few seconds)  with some drooling (while pt was sitting upright). Patient re-examined, VSS (BP improved, 114s/70s after oral hydration). She was AAOx3, able to speak in full sentences. She states her dizziness had improved. Staff encouraged to continue providing oral rehydration. Hypotensive episode with LOC likely in the context of straining, dehydration.  Pt clinically stable with exam otherwise unremarkable.     Plan:  1. no further medical intervention necessary at this time.   2. will continue to monitor routinely.   3. d/w RN staff, if pt becomes clinically unstable, will recommend ED transfer.

## 2021-09-18 NOTE — BH INPATIENT PSYCHIATRY PROGRESS NOTE - NSBHCHARTREVIEWVS_PSY_A_CORE FT
Vital Signs Last 24 Hrs  T(C): 37.1 (09-17-21 @ 17:58), Max: 37.1 (09-17-21 @ 17:58)  T(F): 98.7 (09-17-21 @ 17:58), Max: 98.7 (09-17-21 @ 17:58)  HR: --  BP: --  BP(mean): --  RR: --  SpO2: --    Orthostatic VS  09-17-21 @ 07:33  Lying BP: --/-- HR: --  Sitting BP: 162/64 HR: 79  Standing BP: 155/63 HR: 95  Site: --  Mode: --

## 2021-09-18 NOTE — CHART NOTE - NSCHARTNOTEFT_GEN_A_CORE
CC: Syncope x 4:30    HPI: MRR called at 4:30AM regarding 80 year old female for syncope. At 3:45am, Pt was noted sitting on toilet seat straining during bowel movement felt dizzy and was helped to her side by staff no fall. Pt noted to have change of consciousness at 4:30, when sitting on shower chair. Pt experienced syncopal episode for 3-5 seconds observed by nurse. Pt BP;97/37 HR: 51. When arriving to unit, pt was more alert and oriented. Pt reports she felt dizzy after straining during BM. Denies any headache, dizziness, chest pain, SOB, abdominal pain, N/V.       Vitals  Temp: 98.7  BP: 97/37  HR: 51  RR: 16  Pulse ox: 96     Re-check BP: 108/48  Re-check HR: 62      Physical Exam:   General: Pt AAOx3 in no apparent distress was able to sit up on her bed.  Head: Normocephalic, atraumatic  Eyes: EOMI,   Chest: RRR, +s1 and +s2. No murmurs or gallops  Resp: Lungs clear to auscultation B/L.  Abdomen: normoactive, +BS. nontender to palpation.   Neuro: non-focal.    Assessment/Plan:  80 year old female presenting today for syncope due to vasovagal. Pt instructed to increase oral hydration and PO intake. Will continue to monitor pt. Symptoms resloved, will continue to monitor pt overnight. CC: Syncope x 4:30    HPI: MRR called at 4:30AM regarding 80 year old female for syncope. At 3:45am, Pt was noted sitting on toilet seat straining during bowel movement felt dizzy and was helped to her side by staff no fall. Pt noted to have change of consciousness at 4:30, when sitting on shower chair. Pt experienced syncopal episode for 3-5 seconds observed by nurse. Pt BP;97/37 HR: 51. When arriving to unit, pt was more alert and oriented. Pt reports she felt dizzy after straining during BM. Denies any headache, dizziness, chest pain, SOB, abdominal pain, N/V.       Vitals  Temp: 98.7  BP: 97/37  HR: 51  RR: 16  Pulse ox: 96     Re-check BP: 108/48  Re-check HR: 62  FS:124    EKG: Normal sinus rhythm, no ST elevation or depression.       Physical Exam:  General: Pt AAOx3 in no apparent distress was able to sit up on her bed.  Head: Normocephalic, atraumatic  Eyes: EOMI,   Chest: RRR, +s1 and +s2. No murmurs or gallops  Resp: Lungs clear to auscultation B/L.  Abdomen: normoactive, +BS. nontender to palpation.   Neuro: non-focal.    Assessment/Plan:  80 year old female presenting today for syncope due to vasovagal. Pt instructed to increase oral hydration and PO intake. Will continue to monitor pt. Symptoms resloved, will continue to monitor pt overnight.

## 2021-09-18 NOTE — BH INPATIENT PSYCHIATRY PROGRESS NOTE - NSBHFUPINTERVALHXFT_PSY_A_CORE
Pt states she had incident overnight (as per staff report, pt had nausea with stable vitals).  Today feels ok, awaiting ECT Monday.

## 2021-09-19 PROCEDURE — 99231 SBSQ HOSP IP/OBS SF/LOW 25: CPT

## 2021-09-19 RX ORDER — LACTOBACILLUS ACIDOPHILUS 100MM CELL
1 CAPSULE ORAL DAILY
Refills: 0 | Status: DISCONTINUED | OUTPATIENT
Start: 2021-09-19 | End: 2021-10-18

## 2021-09-19 RX ADMIN — ESCITALOPRAM OXALATE 10 MILLIGRAM(S): 10 TABLET, FILM COATED ORAL at 09:20

## 2021-09-19 RX ADMIN — AMLODIPINE BESYLATE 10 MILLIGRAM(S): 2.5 TABLET ORAL at 09:20

## 2021-09-19 RX ADMIN — QUETIAPINE FUMARATE 125 MILLIGRAM(S): 200 TABLET, FILM COATED ORAL at 20:49

## 2021-09-19 RX ADMIN — FAMOTIDINE 20 MILLIGRAM(S): 10 INJECTION INTRAVENOUS at 09:20

## 2021-09-19 RX ADMIN — FAMOTIDINE 20 MILLIGRAM(S): 10 INJECTION INTRAVENOUS at 20:49

## 2021-09-19 RX ADMIN — Medication 75 MICROGRAM(S): at 06:43

## 2021-09-19 RX ADMIN — NORTRIPTYLINE HYDROCHLORIDE 10 MILLIGRAM(S): 10 CAPSULE ORAL at 20:49

## 2021-09-19 RX ADMIN — Medication 1 TABLET(S): at 13:03

## 2021-09-19 NOTE — BH INPATIENT PSYCHIATRY PROGRESS NOTE - NSBHCHARTREVIEWVS_PSY_A_CORE FT
Vital Signs Last 24 Hrs  T(C): 36.7 (09-19-21 @ 08:36), Max: 37.2 (09-18-21 @ 17:52)  T(F): 98.1 (09-19-21 @ 08:36), Max: 98.9 (09-18-21 @ 17:52)  HR: --  BP: --  BP(mean): --  RR: 18 (09-19-21 @ 08:36) (18 - 18)  SpO2: --    Orthostatic VS  09-19-21 @ 08:36  Lying BP: --/-- HR: --  Sitting BP: 104/76 HR: 110  Standing BP: 122/62 HR: 96  Site: --  Mode: --

## 2021-09-19 NOTE — BH INPATIENT PSYCHIATRY PROGRESS NOTE - NSBHASSESSSUMMFT_PSY_ALL_CORE
Patient is a 80-year-old female, , mother, non-caregiver, domiciled alone, in private residence, retired dx of Bipolar I D/O, multiple psychiatric hospitalizations, most recently psychiatrically hospitalized at [facility]: 7/15/20-7/31/20 depression ( discharged medically and readmitted from 8/6/20-8/25/20 for depression, SI and anxiety where she rec’d 10 ECT treatments (no medication changes, was taking Lexapro and Seroquel), history of ECT with good response, currently in outpatient treatment with Dr. Donovan who prescribes Escitalopram 10mg, Amitriptyline 100mg, Nortriptyline 10mg and Seroquel 100mg, one suicide via cutting wrists about one year ago, no history of violence, no legal involvement, no NSSIB and no access to guns/weapons, presents [facility] with her daughter Yen requesting voluntary admission due to worsening sx’s of depression, SI and manic sx’s.  Clinical picture is significant for bipolar depression with worsening symptoms of depression and SI, multiple failed medication trials and recent SI with plan to cut wrist. Patient is seeking admission and is interested in ETC.     Depressed, awaiting ECT.    c/w Lexapro, Nortriptyline, Seroquel.  Taper off Tegretol as per ECT.

## 2021-09-19 NOTE — BH INPATIENT PSYCHIATRY PROGRESS NOTE - MSE UNSTRUCTURED FT
Dressed appropriately.  Fair hygiene and grooming.  Calm and cooperative.  No abnormal movements noted.  Fairly well related, good eye contact.  Speech regular rate.  Mood is "ok," affect depressed.  Linear TP, fair associations.  TC: No SIIP or HIIP.  Insight fair, judgment fair on interview, attention fair, language fluent, gait intact. 
Dressed appropriately.  Fair hygiene and grooming.  Calm and cooperative.  Psychomotor slowed.  No abnormal movements noted.  Fairly well related, good eye contact.  Speech laconic.  Mood is "ok," affect depressed.  Linear TP, fair associations.  TC: No SIIP or HIIP.  Insight fair, judgment fair on interview, attention fair, language fluent, gait intact.

## 2021-09-20 LAB — SARS-COV-2 RNA SPEC QL NAA+PROBE: SIGNIFICANT CHANGE UP

## 2021-09-20 PROCEDURE — 99232 SBSQ HOSP IP/OBS MODERATE 35: CPT

## 2021-09-20 RX ORDER — POLYETHYLENE GLYCOL 3350 17 G/17G
17 POWDER, FOR SOLUTION ORAL DAILY
Refills: 0 | Status: DISCONTINUED | OUTPATIENT
Start: 2021-09-20 | End: 2021-09-22

## 2021-09-20 RX ADMIN — FAMOTIDINE 20 MILLIGRAM(S): 10 INJECTION INTRAVENOUS at 21:17

## 2021-09-20 RX ADMIN — AMLODIPINE BESYLATE 10 MILLIGRAM(S): 2.5 TABLET ORAL at 08:53

## 2021-09-20 RX ADMIN — Medication 75 MICROGRAM(S): at 06:40

## 2021-09-20 RX ADMIN — NORTRIPTYLINE HYDROCHLORIDE 10 MILLIGRAM(S): 10 CAPSULE ORAL at 21:18

## 2021-09-20 RX ADMIN — FAMOTIDINE 20 MILLIGRAM(S): 10 INJECTION INTRAVENOUS at 08:52

## 2021-09-20 RX ADMIN — QUETIAPINE FUMARATE 125 MILLIGRAM(S): 200 TABLET, FILM COATED ORAL at 21:18

## 2021-09-20 RX ADMIN — ESCITALOPRAM OXALATE 10 MILLIGRAM(S): 10 TABLET, FILM COATED ORAL at 08:53

## 2021-09-20 RX ADMIN — Medication 1 TABLET(S): at 08:53

## 2021-09-20 NOTE — BH INPATIENT PSYCHIATRY PROGRESS NOTE - NSBHCHARTREVIEWVS_PSY_A_CORE FT
Vital Signs Last 24 Hrs  T(C): 36.8 (09-20-21 @ 08:24), Max: 37.1 (09-19-21 @ 18:46)  T(F): 98.2 (09-20-21 @ 08:24), Max: 98.7 (09-19-21 @ 18:46)  HR: --  BP: --  BP(mean): --  RR: 18 (09-20-21 @ 08:24) (18 - 18)  SpO2: --    Orthostatic VS  09-20-21 @ 08:24  Lying BP: --/-- HR: --  Sitting BP: 148/63 HR: 73  Standing BP: 136/68 HR: 88  Site: --  Mode: --  Orthostatic VS  09-19-21 @ 08:36  Lying BP: --/-- HR: --  Sitting BP: 104/76 HR: 110  Standing BP: 122/62 HR: 96  Site: --  Mode: --

## 2021-09-20 NOTE — PROGRESS NOTE ADULT - SUBJECTIVE AND OBJECTIVE BOX
[facility]     DEPT OF DENTAL MEDICINE          Phone: [phone]     Fax: [phone]                          			     Betty Bhatia    									      2021   : 1940   MRN: [id number]   Consult – Dental   Reason for Consult: ECT Clearance     Dental Consult 2021 at 2:00PM.     CC: Pt is a 81 y/o female who presents for ECT consult.          HPI: Pt denies current or recent pain in teeth, mouth or jaw. Pt. Last seen dentist within past year. Pt reports ECT planned for 2021.          PMHx: 81 y/o F pmh bipolar disorder, depression and hx of SI/HI with multiple [facility] hospitalizations.                [facility] admitting diagnosis: Major depressive disorder with single episode           PAST MEDICAL & SURGICAL HISTORY:     Hypertension           Suicidal ideation           Hypothyroid           Psychiatric disorder     bipolar disorder with psychotic features with depression and hx of SI/HI with multiple [facility] hospitalizations           H/O right nephrectomy           History of  delivery                             Allergies           No Known Allergies           Intolerances                       Social History:            FAMILY HISTORY:     FH: heart attack           FH: breast cancer                       MEDICATIONS  (STANDING):     amLODIPine   Tablet 10 milliGRAM(s) Oral daily     carBAMazepine 200 milliGRAM(s) Oral at bedtime     carBAMazepine 100 milliGRAM(s) Oral daily     escitalopram 10 milliGRAM(s) Oral daily     famotidine    Tablet 20 milliGRAM(s) Oral two times a day     influenza  Vaccine (HIGH DOSE) 0.7 milliLiter(s) IntraMuscular once     levothyroxine 75 MICROGram(s) Oral daily     nortriptyline 10 milliGRAM(s) Oral at bedtime     QUEtiapine 125 milliGRAM(s) Oral at bedtime           MEDICATIONS  (PRN):     haloperidol     Tablet 5 milliGRAM(s) Oral every 6 hours PRN agitation     haloperidol    Injectable 5 milliGRAM(s) IntraMuscular once PRN severe agitation     LORazepam     Tablet 1 milliGRAM(s) Oral every 4 hours PRN anxiety     LORazepam   Injectable 2 milliGRAM(s) IntraMuscular once PRN severe agitation                 Vital Signs Last 24 Hrs     T(C): 36.9 (15 Sep 2021 16:33), Max: 36.9 (15 Sep 2021 16:33)     T(F): 98.5 (15 Sep 2021 16:33), Max: 98.5 (15 Sep 2021 16:33)     HR: 74     BP: 154/62     RR: 19 (15 Sep 2021 16:33) (19 - )     SpO2: --     CAPILLARY BLOOD GLUCOSE          ALLERGIES:   NKDA          EOE: (-) asymmetry, (-) swelling, (-) trismus, (-) palpation, (-) LAD, (-) SOB.     TMJ: WNL, full range of motion.      IOE: Missing #1, #2, #12, #13, #16, #18, #31 and #32.            (+) Restorations: #3 PFM, #4,#5 temporary crowns, #7/#8 lingual amalgam, #11 DL comp #14 and #15 PFM crowns, #17 Occ amalgam, #19 PFM crown, #20 MODL comp, #21 Occlusal amalgam, #28 DO comp, #29/#30 PFM crowns.          Assessment: Multiple restored teeth and crowns. Recommended fabricated mouth guard from dental clinic.           Treatment: EOE, IOE and dentition charted. Recommended Mouthguard due to multiple restorations. Alginate impressions taken. Mouth guard to be made. Pickup before ECT on 2021.         Recommendations:     Pt is cleared by dental for ECT treatment      Mouthguard prior to treatment from [facility] adult dental clinic.      Please avoid straws, smoking, gargling. Bite down on gauze if bleeding occurs.      Pt was advised to seek comprehensive dental care upon discharge from [facility].     Contact [facility] dental for any acute dental needs [phone].          Thank you,           Mack Whelan, ALVAROS #[id number]     Sofi Alarcon DDS #[id number]          Supervised by: Dr. Thompson

## 2021-09-20 NOTE — BH INPATIENT PSYCHIATRY PROGRESS NOTE - OTHER
endorsed initially but denies current SI   reports will tell staff if she feels she will harm self or others

## 2021-09-20 NOTE — BH INPATIENT PSYCHIATRY PROGRESS NOTE - NSBHASSESSSUMMFT_PSY_ALL_CORE
Patient is a 80-year-old female, , mother, non-caregiver, domiciled alone, in private residence, retired dx of Bipolar I D/O, multiple psychiatric hospitalizations, most recently psychiatrically hospitalized at [facility]: 7/15/20-7/31/20 depression ( discharged medically and readmitted from 8/6/20-8/25/20 for depression, SI and anxiety where she rec’d 10 ECT treatments (no medication changes, was taking Lexapro and Seroquel), history of ECT with good response, currently in outpatient treatment with Dr. Donovan who prescribes Escitalopram 10mg, Amitriptyline 100mg, Nortriptyline 10mg and Seroquel 100mg, one suicide via cutting wrists about one year ago, no history of violence, no legal involvement, no NSSIB and no access to guns/weapons, presents [facility] with her daughter Yen requesting voluntary admission due to worsening sx’s of depression, SI and manic sx’s.  Clinical picture is significant for bipolar depression with worsening symptoms of depression and SI, multiple failed medication trials and recent SI with plan to cut wrist. Patient is seeking admission and is interested in ETC.     Depressed, awaiting ECT.    c/w Lexapro, Nortriptyline, Seroquel.  Taper off Tegretol as per ECT.

## 2021-09-20 NOTE — BH INPATIENT PSYCHIATRY PROGRESS NOTE - NSBHFUPINTERVALHXFT_PSY_A_CORE
Patient seen for follow up for bipolar depression, chart reviewed, case discussed with Dr. Mccurdy and in tx team meeting with interdisciplinary staff. Patient reports she is depressed and ECT has helped her in the past.  Patient reports sleeping and eating ok.  Reports some constipation. Patient is Rx adherent and denies any SE and none are noted.  Denies any SI or HI.  Reports will tell staff if she has thoughts of harming self or others.  Patient teaching done re: need for NPO from midnight prior to ECT until after ECT the next day with teach back verbalized. VS reviewed, EKG is on chart  Covid test ordered pre ECT.. and CBC and CMP ordered for am.   Miralax ordered

## 2021-09-21 LAB
ANION GAP SERPL CALC-SCNC: 12 MMOL/L — SIGNIFICANT CHANGE UP (ref 7–14)
BUN SERPL-MCNC: 13 MG/DL — SIGNIFICANT CHANGE UP (ref 7–23)
CALCIUM SERPL-MCNC: 9.3 MG/DL — SIGNIFICANT CHANGE UP (ref 8.4–10.5)
CHLORIDE SERPL-SCNC: 99 MMOL/L — SIGNIFICANT CHANGE UP (ref 98–107)
CO2 SERPL-SCNC: 25 MMOL/L — SIGNIFICANT CHANGE UP (ref 22–31)
CREAT SERPL-MCNC: 0.73 MG/DL — SIGNIFICANT CHANGE UP (ref 0.5–1.3)
GLUCOSE SERPL-MCNC: 95 MG/DL — SIGNIFICANT CHANGE UP (ref 70–99)
HCT VFR BLD CALC: 36.7 % — SIGNIFICANT CHANGE UP (ref 34.5–45)
HGB BLD-MCNC: 12.4 G/DL — SIGNIFICANT CHANGE UP (ref 11.5–15.5)
MCHC RBC-ENTMCNC: 32 PG — SIGNIFICANT CHANGE UP (ref 27–34)
MCHC RBC-ENTMCNC: 33.8 GM/DL — SIGNIFICANT CHANGE UP (ref 32–36)
MCV RBC AUTO: 94.8 FL — SIGNIFICANT CHANGE UP (ref 80–100)
NRBC # BLD: 0 /100 WBCS — SIGNIFICANT CHANGE UP
NRBC # FLD: 0 K/UL — SIGNIFICANT CHANGE UP
PLATELET # BLD AUTO: 325 K/UL — SIGNIFICANT CHANGE UP (ref 150–400)
POTASSIUM SERPL-MCNC: 4.2 MMOL/L — SIGNIFICANT CHANGE UP (ref 3.5–5.3)
POTASSIUM SERPL-SCNC: 4.2 MMOL/L — SIGNIFICANT CHANGE UP (ref 3.5–5.3)
RBC # BLD: 3.87 M/UL — SIGNIFICANT CHANGE UP (ref 3.8–5.2)
RBC # FLD: 12.9 % — SIGNIFICANT CHANGE UP (ref 10.3–14.5)
SODIUM SERPL-SCNC: 136 MMOL/L — SIGNIFICANT CHANGE UP (ref 135–145)
WBC # BLD: 4.54 K/UL — SIGNIFICANT CHANGE UP (ref 3.8–10.5)
WBC # FLD AUTO: 4.54 K/UL — SIGNIFICANT CHANGE UP (ref 3.8–10.5)

## 2021-09-21 PROCEDURE — 99232 SBSQ HOSP IP/OBS MODERATE 35: CPT

## 2021-09-21 RX ORDER — ACETAMINOPHEN 500 MG
325 TABLET ORAL EVERY 4 HOURS
Refills: 0 | Status: DISCONTINUED | OUTPATIENT
Start: 2021-09-21 | End: 2021-09-23

## 2021-09-21 RX ADMIN — ESCITALOPRAM OXALATE 10 MILLIGRAM(S): 10 TABLET, FILM COATED ORAL at 08:33

## 2021-09-21 RX ADMIN — POLYETHYLENE GLYCOL 3350 17 GRAM(S): 17 POWDER, FOR SOLUTION ORAL at 08:32

## 2021-09-21 RX ADMIN — FAMOTIDINE 20 MILLIGRAM(S): 10 INJECTION INTRAVENOUS at 08:32

## 2021-09-21 RX ADMIN — FAMOTIDINE 20 MILLIGRAM(S): 10 INJECTION INTRAVENOUS at 20:43

## 2021-09-21 RX ADMIN — Medication 1 TABLET(S): at 08:33

## 2021-09-21 RX ADMIN — Medication 75 MICROGRAM(S): at 06:28

## 2021-09-21 RX ADMIN — Medication 325 MILLIGRAM(S): at 16:40

## 2021-09-21 RX ADMIN — Medication 325 MILLIGRAM(S): at 15:42

## 2021-09-21 RX ADMIN — Medication 325 MILLIGRAM(S): at 21:49

## 2021-09-21 RX ADMIN — Medication 325 MILLIGRAM(S): at 20:52

## 2021-09-21 RX ADMIN — NORTRIPTYLINE HYDROCHLORIDE 10 MILLIGRAM(S): 10 CAPSULE ORAL at 20:43

## 2021-09-21 RX ADMIN — AMLODIPINE BESYLATE 10 MILLIGRAM(S): 2.5 TABLET ORAL at 08:33

## 2021-09-21 RX ADMIN — QUETIAPINE FUMARATE 125 MILLIGRAM(S): 200 TABLET, FILM COATED ORAL at 20:44

## 2021-09-21 NOTE — BH INPATIENT PSYCHIATRY PROGRESS NOTE - NSBHFUPINTERVALHXFT_PSY_A_CORE
Patient seen for follow up for bipolar depression, chart reviewed, case discussed with Dr. Mccurdy and in tx team meeting with interdisciplinary staff. Patient continues to report she is depressed and ECT has helped her in the past.  Patient reports sleeping and eating ok.  Patient is Rx adherent and denies any SE and none are noted.  Denies any SI or HI.  Reports will tell staff if she has thoughts of harming self or others.  Patient teaching done again re: need for NPO from midnight prior to ECT until after ECT the next day with teach back verbalized. VS reviewed, EKG is on chart  Covid test negative. Lab work reviewed.

## 2021-09-21 NOTE — BH INPATIENT PSYCHIATRY PROGRESS NOTE - NSBHCHARTREVIEWVS_PSY_A_CORE FT
Vital Signs Last 24 Hrs  T(C): 37.1 (09-21-21 @ 07:42), Max: 37.1 (09-21-21 @ 07:42)  T(F): 98.8 (09-21-21 @ 07:42), Max: 98.8 (09-21-21 @ 07:42)  HR: --  BP: --  BP(mean): --  RR: 17 (09-21-21 @ 07:42) (17 - 17)  SpO2: --    Orthostatic VS  09-21-21 @ 07:42  Lying BP: --/-- HR: --  Sitting BP: 148/67 HR: 78  Standing BP: 147/68 HR: 80  Site: --  Mode: --  Orthostatic VS  09-20-21 @ 08:24  Lying BP: --/-- HR: --  Sitting BP: 148/63 HR: 73  Standing BP: 136/68 HR: 88  Site: --  Mode: --

## 2021-09-21 NOTE — BH INPATIENT PSYCHIATRY PROGRESS NOTE - NSBHASSESSSUMMFT_PSY_ALL_CORE
Patient is a 80-year-old female, , mother, non-caregiver, domiciled alone, in private residence, retired dx of Bipolar I D/O, multiple psychiatric hospitalizations, most recently psychiatrically hospitalized at [facility]: 7/15/20-7/31/20 depression ( discharged medically and readmitted from 8/6/20-8/25/20 for depression, SI and anxiety where she rec’d 10 ECT treatments (no medication changes, was taking Lexapro and Seroquel), history of ECT with good response, currently in outpatient treatment with Dr. Donovan who prescribes Escitalopram 10mg, Amitriptyline 100mg, Nortriptyline 10mg and Seroquel 100mg, one suicide via cutting wrists about one year ago, no history of violence, no legal involvement, no NSSIB and no access to guns/weapons, presents [facility] with her daughter Yen requesting voluntary admission due to worsening sx’s of depression, SI and manic sx’s.  Clinical picture is significant for bipolar depression with worsening symptoms of depression and SI, multiple failed medication trials and recent SI with plan to cut wrist. Patient is seeking admission and is interested in ETC.     Depressed, awaiting ECT.  c/o constipation, Miralax begun  c/w Lexapro, Nortriptyline, Seroquel.  Taper off Tegretol as per ECT.   to begin ECT on 9/22

## 2021-09-22 PROCEDURE — 90870 ELECTROCONVULSIVE THERAPY: CPT

## 2021-09-22 RX ORDER — POLYETHYLENE GLYCOL 3350 17 G/17G
17 POWDER, FOR SOLUTION ORAL DAILY
Refills: 0 | Status: DISCONTINUED | OUTPATIENT
Start: 2021-09-22 | End: 2021-10-18

## 2021-09-22 RX ORDER — MIDAZOLAM HYDROCHLORIDE 1 MG/ML
2 INJECTION, SOLUTION INTRAMUSCULAR; INTRAVENOUS ONCE
Refills: 0 | Status: DISCONTINUED | OUTPATIENT
Start: 2021-09-22 | End: 2021-09-22

## 2021-09-22 RX ADMIN — Medication 75 MICROGRAM(S): at 06:05

## 2021-09-22 RX ADMIN — FAMOTIDINE 20 MILLIGRAM(S): 10 INJECTION INTRAVENOUS at 08:55

## 2021-09-22 RX ADMIN — NORTRIPTYLINE HYDROCHLORIDE 10 MILLIGRAM(S): 10 CAPSULE ORAL at 20:51

## 2021-09-22 RX ADMIN — Medication 1 TABLET(S): at 08:54

## 2021-09-22 RX ADMIN — Medication 325 MILLIGRAM(S): at 23:38

## 2021-09-22 RX ADMIN — AMLODIPINE BESYLATE 10 MILLIGRAM(S): 2.5 TABLET ORAL at 08:55

## 2021-09-22 RX ADMIN — ESCITALOPRAM OXALATE 10 MILLIGRAM(S): 10 TABLET, FILM COATED ORAL at 08:55

## 2021-09-22 RX ADMIN — Medication 325 MILLIGRAM(S): at 22:39

## 2021-09-22 RX ADMIN — FAMOTIDINE 20 MILLIGRAM(S): 10 INJECTION INTRAVENOUS at 20:51

## 2021-09-22 RX ADMIN — POLYETHYLENE GLYCOL 3350 17 GRAM(S): 17 POWDER, FOR SOLUTION ORAL at 08:54

## 2021-09-22 RX ADMIN — QUETIAPINE FUMARATE 125 MILLIGRAM(S): 200 TABLET, FILM COATED ORAL at 20:51

## 2021-09-22 NOTE — BH INPATIENT PSYCHIATRY PROGRESS NOTE - NSBHASSESSSUMMFT_PSY_ALL_CORE
Patient is a 80-year-old female, , mother, non-caregiver, domiciled alone, in private residence, retired dx of Bipolar I D/O, multiple psychiatric hospitalizations, most recently psychiatrically hospitalized at [facility]: 7/15/20-7/31/20 depression ( discharged medically and readmitted from 8/6/20-8/25/20 for depression, SI and anxiety where she rec’d 10 ECT treatments (no medication changes, was taking Lexapro and Seroquel), history of ECT with good response, currently in outpatient treatment with Dr. Donovan who prescribes Escitalopram 10mg, Amitriptyline 100mg, Nortriptyline 10mg and Seroquel 100mg, one suicide via cutting wrists about one year ago, no history of violence, no legal involvement, no NSSIB and no access to guns/weapons, presents [facility] with her daughter Yen requesting voluntary admission due to worsening sx’s of depression, SI and manic sx’s.  Clinical picture is significant for bipolar depression with worsening symptoms of depression and SI, multiple failed medication trials and recent SI with plan to cut wrist. Patient is seeking admission and is interested in ETC.     Depressed, awaiting ECT.  c/o constipation, Miralax begun  c/w Lexapro, Nortriptyline, Seroquel.  Taper off Tegretol as per ECT.   Started ECT on 9/22

## 2021-09-22 NOTE — BH INPATIENT PSYCHIATRY PROGRESS NOTE - NSBHCHARTREVIEWVS_PSY_A_CORE FT
Vital Signs Last 24 Hrs  T(C): 37.1 (09-22-21 @ 07:33), Max: 37.1 (09-22-21 @ 07:33)  T(F): 98.8 (09-22-21 @ 07:33), Max: 98.8 (09-22-21 @ 07:33)  HR: 94 (09-22-21 @ 11:37) (94 - 94)  BP: 134/67 (09-22-21 @ 11:37) (134/67 - 134/67)  BP(mean): --  RR: 18 (09-22-21 @ 11:37) (18 - 18)  SpO2: --    Orthostatic VS  09-22-21 @ 07:33  Lying BP: --/-- HR: --  Sitting BP: 134/67 HR: 94  Standing BP: 133/66 HR: 104  Site: --  Mode: --  Orthostatic VS  09-21-21 @ 07:42  Lying BP: --/-- HR: --  Sitting BP: 148/67 HR: 78  Standing BP: 147/68 HR: 80  Site: --  Mode: --

## 2021-09-22 NOTE — BH INPATIENT PSYCHIATRY PROGRESS NOTE - NSBHFUPINTERVALHXFT_PSY_A_CORE
Patient seen for follow up for bipolar depression, chart reviewed, case discussed  in tx team meeting with interdisciplinary staff. Patient interviewed prior to ECT.  Patient continues to report she is depressed and reports she feels more depressed today,  and ECT has helped her in the past. Discussed case with ECT staff.  Patient reports sleeping and eating ok.  Patient is Rx adherent and denies any SE and none are noted.  Denies any SI or HI.  Reports will tell staff if she has thoughts of harming self or others.  Patient teaching done again re: need for NPO from midnight prior to ECT until after ECT today with teach back verbalized. VS reviewed, EKG is on chart  Covid test negative. Lab work reviewed. Spoke with patient's son on 9/21 and he discussed his concerns about his mother's depression.  Support provided.  Patient to have ECT #1 today

## 2021-09-23 PROCEDURE — 99222 1ST HOSP IP/OBS MODERATE 55: CPT

## 2021-09-23 PROCEDURE — 99232 SBSQ HOSP IP/OBS MODERATE 35: CPT

## 2021-09-23 RX ORDER — ACETAMINOPHEN 500 MG
650 TABLET ORAL EVERY 6 HOURS
Refills: 0 | Status: DISCONTINUED | OUTPATIENT
Start: 2021-09-23 | End: 2021-10-18

## 2021-09-23 RX ORDER — SENNA PLUS 8.6 MG/1
2 TABLET ORAL AT BEDTIME
Refills: 0 | Status: DISCONTINUED | OUTPATIENT
Start: 2021-09-23 | End: 2021-10-18

## 2021-09-23 RX ORDER — LIDOCAINE 4 G/100G
2 CREAM TOPICAL EVERY 24 HOURS
Refills: 0 | Status: DISCONTINUED | OUTPATIENT
Start: 2021-09-23 | End: 2021-10-18

## 2021-09-23 RX ADMIN — Medication 1 TABLET(S): at 12:15

## 2021-09-23 RX ADMIN — QUETIAPINE FUMARATE 125 MILLIGRAM(S): 200 TABLET, FILM COATED ORAL at 21:03

## 2021-09-23 RX ADMIN — ESCITALOPRAM OXALATE 10 MILLIGRAM(S): 10 TABLET, FILM COATED ORAL at 10:26

## 2021-09-23 RX ADMIN — AMLODIPINE BESYLATE 10 MILLIGRAM(S): 2.5 TABLET ORAL at 10:26

## 2021-09-23 RX ADMIN — FAMOTIDINE 20 MILLIGRAM(S): 10 INJECTION INTRAVENOUS at 21:03

## 2021-09-23 RX ADMIN — LIDOCAINE 2 PATCH: 4 CREAM TOPICAL at 15:11

## 2021-09-23 RX ADMIN — NORTRIPTYLINE HYDROCHLORIDE 10 MILLIGRAM(S): 10 CAPSULE ORAL at 21:03

## 2021-09-23 RX ADMIN — Medication 75 MICROGRAM(S): at 06:29

## 2021-09-23 RX ADMIN — SENNA PLUS 2 TABLET(S): 8.6 TABLET ORAL at 21:03

## 2021-09-23 RX ADMIN — FAMOTIDINE 20 MILLIGRAM(S): 10 INJECTION INTRAVENOUS at 10:26

## 2021-09-23 NOTE — BH INPATIENT PSYCHIATRY PROGRESS NOTE - NSBHASSESSSUMMFT_PSY_ALL_CORE
Patient is a 80-year-old female, , mother, non-caregiver, domiciled alone, in private residence, retired dx of Bipolar I D/O, multiple psychiatric hospitalizations, most recently psychiatrically hospitalized at [facility]: 7/15/20-7/31/20 depression ( discharged medically and readmitted from 8/6/20-8/25/20 for depression, SI and anxiety where she rec’d 10 ECT treatments (no medication changes, was taking Lexapro and Seroquel), history of ECT with good response, currently in outpatient treatment with Dr. Donovan who prescribes Escitalopram 10mg, Amitriptyline 100mg, Nortriptyline 10mg and Seroquel 100mg, one suicide via cutting wrists about one year ago, no history of violence, no legal involvement, no NSSIB and no access to guns/weapons, presents [facility] with her daughter Yen requesting voluntary admission due to worsening sx’s of depression, SI and manic sx’s.  Clinical picture is significant for bipolar depression with worsening symptoms of depression and SI, multiple failed medication trials and recent SI with plan to cut wrist. Patient is seeking admission and is interested in ECT.     Depressed,   c/o constipation, Miralax begun  c/w Lexapro, Nortriptyline, Seroquel.  Taper off Tegretol as per ECT.   Started ECT on 9/22  c/o back pain which she has had prior to hospitalization    Hospitalist ordered Lidocaine patch and Tylenol

## 2021-09-23 NOTE — BH INPATIENT PSYCHIATRY PROGRESS NOTE - NSBHCHARTREVIEWVS_PSY_A_CORE FT
Vital Signs Last 24 Hrs  T(C): 36.9 (09-23-21 @ 08:12), Max: 37.1 (09-22-21 @ 17:25)  T(F): 98.4 (09-23-21 @ 08:12), Max: 98.8 (09-22-21 @ 17:25)  HR: 79 (09-22-21 @ 20:36) (79 - 94)  BP: 154/78 (09-22-21 @ 20:36) (134/67 - 199/98)  BP(mean): --  RR: 18 (09-23-21 @ 08:12) (16 - 19)  SpO2: 96% (09-22-21 @ 18:00) (94% - 98%)    Orthostatic VS  09-23-21 @ 08:12  Lying BP: --/-- HR: --  Sitting BP: 144/69 HR: 79  Standing BP: 148/67 HR: 86  Site: --  Mode: --  Orthostatic VS  09-22-21 @ 07:33  Lying BP: --/-- HR: --  Sitting BP: 134/67 HR: 94  Standing BP: 133/66 HR: 104  Site: --  Mode: --

## 2021-09-23 NOTE — CONSULT NOTE ADULT - ASSESSMENT
Patient is a 80 year old with past medical history of HTN, Hypothyroidism, H/o right nephrectomy, Bipolar disorder w/ psychotic features admitted to [facility] for management of her psychiatric conditions. Medicine consulted for low back pain. DDx lumbar strain vs OA vs spinal stenosis vs degenerative disc disease. Patient w/o red flag symptoms.    #Back Pain   - patient w/o red flag symptoms can use tylenol 650mg q 6 hours as needed for pain, can start baclofen 5mg x daily if pain not relieved by Tylenol   - lidocaine patch for left lower back and right lower back ordered  - patient would benefit from physical therapy   - possible component of constipation contributing to back pain, bowel regimen     #Constipation   Patient on miralax for constipation patient reports No BM for 3 days.   - added on senna     #Urinary Frequency  - send UA to r/o UTI      Patient is a 80 year old with past medical history of HTN, Hypothyroidism, H/o right nephrectomy, Bipolar disorder w/ psychotic features admitted to [facility] for management of her psychiatric conditions. Medicine consulted for low back pain. DDx lumbar strain vs OA vs spinal stenosis vs degenerative disc disease. Patient w/o red flag symptoms.    #Back Pain   - patient w/o red flag symptoms can use tylenol 650mg q 6 hours as needed for pain, can start baclofen 5mg 3 x daily if pain not relieved by Tylenol   - lidocaine patch for left lower back and right lower back ordered  - patient would benefit from physical therapy   - possible component of constipation contributing to back pain, bowel regimen     #Constipation   Patient on miralax for constipation patient reports No BM for 3 days.   - added on senna     #Urinary Frequency  - send UA to r/o UTI

## 2021-09-23 NOTE — BH INPATIENT PSYCHIATRY PROGRESS NOTE - NSBHFUPINTERVALHXFT_PSY_A_CORE
Patient seen for follow up for bipolar depression, chart reviewed, case discussed  in tx team meeting with interdisciplinary staff and with Dr. Mccurdy.  Patient continues to report she is depressed and reports she feels more depressed today, and she hopes ECT will help her.   Patient reports sleeping and eating ok.  Patient is Rx adherent and denies any SE and none are noted.  Denies any SI or HI.  Reports will tell staff if she has thoughts of harming self or others.  Patient teaching done again re: need for NPO from midnight prior to ECT until after ECT tomorrow with teach back verbalized. VS reviewed, EKG is on chart  Covid test negative..  Patient to have ECT #2 tomorrow.  Patient had c/o back pain which she reports she had for a while prior to hospitalization.  Discussed case with Dr. Hughes, hospitalist, who saw patient and prescribed lidocaine patch and Tylenol.

## 2021-09-23 NOTE — CONSULT NOTE ADULT - SUBJECTIVE AND OBJECTIVE BOX
CC: 80 year old complaining of low back pain.     PAST MEDICAL & SURGICAL HISTORY:  Hypertension  Suicidal ideation  Hypothyroid  Psychiatric disorder  bipolar disorder with psychotic features with depression and hx of SI/HI with multiple [facility] hospitalizations  H/O right nephrectomy  History of  delivery          INTERVAL HPI/OVERNIGHT EVENTS:  Patient was seen and examined at bedside. As per nurse and patient, no o/n events, patient resting comfortably. No complaints at this time. Patient denies: fever, chills, dizziness, weakness, HA, Changes in vision, CP, palpitations, SOB, cough, N/V/D/C, dysuria, changes in bowel movements, LE edema. ROS otherwise negative.    VITAL SIGNS:  T(F): 98.4 (21 @ 08:12)  HR: 79 (21 @ 20:36)  BP: 154/78 (21 @ 20:36)  RR: 18 (21 @ 08:12)  SpO2: 96% (21 @ 18:00)  Wt(kg): --    PHYSICAL EXAM:    Constitutional: WDWN, NAD  HEENT: PERRL, EOMI, sclera non-icteric, neck supple, trachea midline, no masses, no JVD, MMM, good dentition  Respiratory: CTA b/l, good air entry b/l, no wheezing, no rhonchi, no rales, without accessory muscle use and no intercostal retractions  Cardiovascular: RRR, normal S1S2, no M/R/G  Gastrointestinal: soft, NTND, no masses palpable, BS normal  Extremities: Warm, well perfused, pulses equal bilateral upper and lower extremities, no edema, no clubbing  Neurological: AAOx3, CN Grossly intact  Skin: Normal temperature, warm, dry    MEDICATIONS  (STANDING):  amLODIPine   Tablet 10 milliGRAM(s) Oral daily  escitalopram 10 milliGRAM(s) Oral daily  famotidine    Tablet 20 milliGRAM(s) Oral two times a day  influenza  Vaccine (HIGH DOSE) 0.7 milliLiter(s) IntraMuscular once  lactobacillus acidophilus 1 Tablet(s) Oral daily  levothyroxine 75 MICROGram(s) Oral <User Schedule>  nortriptyline 10 milliGRAM(s) Oral at bedtime  polyethylene glycol 3350 17 Gram(s) Oral daily  QUEtiapine 125 milliGRAM(s) Oral at bedtime    MEDICATIONS  (PRN):  acetaminophen   Tablet .. 325 milliGRAM(s) Oral every 4 hours PRN Moderate Pain (4 - 6)  haloperidol     Tablet 5 milliGRAM(s) Oral every 6 hours PRN agitation  haloperidol    Injectable 5 milliGRAM(s) IntraMuscular once PRN severe agitation  LORazepam     Tablet 1 milliGRAM(s) Oral every 6 hours PRN anxiety  LORazepam   Injectable 1 milliGRAM(s) IntraMuscular once PRN severe agitation      Allergies    No Known Allergies    Intolerances        LABS:                RADIOLOGY & ADDITIONAL TESTS:  Reviewed CC: 80 year old complaining of low back pain.     HPI: Patient is a 80 year old with past medical history of HTN, Hypothyroidism, H/o right nephrectomy, Bipolar disorder w/ psychotic features admitted to [facility] for management of her psychiatric conditions. Medicine consulted for low back pain. Patient complaining of diffuse low back pain. States back pain has been occurring for 3 weeks. States three weeks ago she went to a chiropractor which helped her symptoms. Patient states pain sometimes radiates down her left leg, sometimes her right leg. Patient states she was taking Advil and Tylenol at home w/ relief. Patient states pain is worse when she is lying down at night. Patient states pain is better when she is up during the day. Patient denies any weight loss, urinary incontinence,  fecal incontinence, or saddle anesthesia. Patient states she has been urinating frequently w/o pain which is new. Patient w/ constipation x 3 days. Patient otherwise w/o complaints. Patient denies any trauma or heavy lifting.      PAST MEDICAL & SURGICAL HISTORY:  Hypertension  Suicidal ideation  Hypothyroid  Psychiatric disorder  bipolar disorder with psychotic features with depression and hx of SI/HI with multiple [facility] hospitalizations  H/O right nephrectomy  History of  delivery    FAMILY HISTORY:  FH: heart attack  FH: breast cancer  FH: chronic back pain son, states however this resolved    Social History: non-smoker, non alcohol use, no drug use     REVIEW OF SYSTEMS:  CONSTITUTIONAL: Patient denies weakness, fevers or chills  EYES/ENT: Patient denies visual changes;  denies vertigo or throat pain   NECK: Patient denies pain or stiffness  RESPIRATORY: Patient denies cough, wheezing, hemoptysis; denies shortness of breath  CARDIOVASCULAR: Patient denies chest pain or palpitations  GASTROINTESTINAL: Patient denies abdominal or epigastric pain, nausea, vomiting, or hematemesis, diarrhea, melena or hematochezia. + constipation  GENITOURINARY: Patient denies dysuria,  hematuria, + urinary frequency   MSK: + low back pain   NEUROLOGICAL: Patient denies numbness or weakness  SKIN: Patient denies itching, burning, rashes, or lesions   All other review of systems is negative unless indicated above.    INTERVAL HPI/OVERNIGHT EVENTS:  Patient was seen and examined at bedside complaints as stated above. No overnight events.     VITAL SIGNS:  T(F): 98.4 (21 @ 08:12)  HR: 79 (21 @ 20:36)  BP: 154/78 (21 @ 20:36)  RR: 18 (21 @ 08:12)  SpO2: 96% (21 @ 18:00)  Wt(kg): --    PHYSICAL EXAM:  Constitutional: Elderly woman, sitting in chair NAD   HEENT:  sclera non-icteric, neck supple, no masses, MMM   Respiratory: CTA b/l, good air entry b/l, no wheezing, no rhonchi, no rales, without accessory muscle use and no intercostal retractions  Cardiovascular: RRR, normal S1S2, no M/R/G  Gastrointestinal: soft, NTND, no masses palpable, BS normal  Extremities: Warm, well perfused, pulses equal bilateral upper and lower extremities, no edema, no clubbing  MSK: + tenderness to palpation diffusely over lower back (spinal and paraspinal); Patient w/ FROM (flexion, extension, rotation) w/ pain.   Neurological: AAOx3, CN Grossly intact, 5/5 strength upper and lower extremities, sensation intact   Skin: Normal temperature, warm, dry    MEDICATIONS  (STANDING):  amLODIPine   Tablet 10 milliGRAM(s) Oral daily  escitalopram 10 milliGRAM(s) Oral daily  famotidine    Tablet 20 milliGRAM(s) Oral two times a day  influenza  Vaccine (HIGH DOSE) 0.7 milliLiter(s) IntraMuscular once  lactobacillus acidophilus 1 Tablet(s) Oral daily  levothyroxine 75 MICROGram(s) Oral <User Schedule>  nortriptyline 10 milliGRAM(s) Oral at bedtime  polyethylene glycol 3350 17 Gram(s) Oral daily  QUEtiapine 125 milliGRAM(s) Oral at bedtime    MEDICATIONS  (PRN):  acetaminophen   Tablet .. 325 milliGRAM(s) Oral every 4 hours PRN Moderate Pain (4 - 6)  haloperidol     Tablet 5 milliGRAM(s) Oral every 6 hours PRN agitation  haloperidol    Injectable 5 milliGRAM(s) IntraMuscular once PRN severe agitation  LORazepam     Tablet 1 milliGRAM(s) Oral every 6 hours PRN anxiety  LORazepam   Injectable 1 milliGRAM(s) IntraMuscular once PRN severe agitation      Allergies    No Known Allergies    Intolerances        LABS:  WBC 4k   Hemoglobin 12.4   Plt 325   Na 131  BUN 13   Crt .73              RADIOLOGY & ADDITIONAL TESTS:  Reviewed

## 2021-09-24 PROCEDURE — 99232 SBSQ HOSP IP/OBS MODERATE 35: CPT | Mod: 25

## 2021-09-24 PROCEDURE — 90870 ELECTROCONVULSIVE THERAPY: CPT

## 2021-09-24 RX ADMIN — Medication 75 MICROGRAM(S): at 06:28

## 2021-09-24 RX ADMIN — ESCITALOPRAM OXALATE 10 MILLIGRAM(S): 10 TABLET, FILM COATED ORAL at 09:28

## 2021-09-24 RX ADMIN — FAMOTIDINE 20 MILLIGRAM(S): 10 INJECTION INTRAVENOUS at 09:28

## 2021-09-24 RX ADMIN — AMLODIPINE BESYLATE 10 MILLIGRAM(S): 2.5 TABLET ORAL at 09:28

## 2021-09-24 RX ADMIN — QUETIAPINE FUMARATE 125 MILLIGRAM(S): 200 TABLET, FILM COATED ORAL at 20:47

## 2021-09-24 RX ADMIN — FAMOTIDINE 20 MILLIGRAM(S): 10 INJECTION INTRAVENOUS at 20:46

## 2021-09-24 RX ADMIN — Medication 1 TABLET(S): at 09:28

## 2021-09-24 RX ADMIN — NORTRIPTYLINE HYDROCHLORIDE 10 MILLIGRAM(S): 10 CAPSULE ORAL at 20:46

## 2021-09-24 RX ADMIN — Medication 650 MILLIGRAM(S): at 15:38

## 2021-09-24 RX ADMIN — LIDOCAINE 2 PATCH: 4 CREAM TOPICAL at 15:27

## 2021-09-24 NOTE — BH INPATIENT PSYCHIATRY PROGRESS NOTE - NSBHCHARTREVIEWVS_PSY_A_CORE FT
Vital Signs Last 24 Hrs  T(C): 37.2 (09-24-21 @ 12:50), Max: 37.3 (09-23-21 @ 18:34)  T(F): 99 (09-24-21 @ 12:50), Max: 99.1 (09-23-21 @ 18:34)  HR: 92 (09-24-21 @ 12:50) (73 - 97)  BP: 160/76 (09-24-21 @ 12:50) (136/84 - 179/81)  BP(mean): --  RR: 18 (09-24-21 @ 12:50) (16 - 18)  SpO2: 99% (09-24-21 @ 12:50) (97% - 100%)    Orthostatic VS  09-24-21 @ 08:12  Lying BP: --/-- HR: --  Sitting BP: 135/56 HR: 75  Standing BP: 131/59 HR: 78  Site: --  Mode: --  Orthostatic VS  09-23-21 @ 08:12  Lying BP: --/-- HR: --  Sitting BP: 144/69 HR: 79  Standing BP: 148/67 HR: 86  Site: --  Mode: --   Vital Signs Last 24 Hrs  T(C): 36.8 (09-24-21 @ 18:33), Max: 37.2 (09-24-21 @ 03:17)  T(F): 98.2 (09-24-21 @ 18:33), Max: 99 (09-24-21 @ 12:50)  HR: 92 (09-24-21 @ 12:50) (73 - 97)  BP: 160/76 (09-24-21 @ 12:50) (136/84 - 179/81)  BP(mean): --  RR: 18 (09-24-21 @ 12:50) (16 - 18)  SpO2: 99% (09-24-21 @ 12:50) (97% - 100%)    Orthostatic VS  09-24-21 @ 08:12  Lying BP: --/-- HR: --  Sitting BP: 135/56 HR: 75  Standing BP: 131/59 HR: 78  Site: --  Mode: --  Orthostatic VS  09-23-21 @ 08:12  Lying BP: --/-- HR: --  Sitting BP: 144/69 HR: 79  Standing BP: 148/67 HR: 86  Site: --  Mode: --

## 2021-09-24 NOTE — BH INPATIENT PSYCHIATRY PROGRESS NOTE - NSBHFUPINTERVALHXFT_PSY_A_CORE
Patient seen for follow up for bipolar depression, chart reviewed, case discussed  in tx team meeting with interdisciplinary staff and with Dr. Mccurdy.  Patient continues to report she is depressed and reports she feels more depressed today, and she hopes ECT will help her.   Patient reports sleeping and eating ok.  Patient is Rx adherent and denies any SE and none are noted.  Denies any SI or HI.  Reports will tell staff if she has thoughts of harming self or others.  Patient teaching done again re: need for NPO from midnight prior to ECT until after ECT tomorrow with teach back verbalized. VS reviewed, EKG is on chart  Covid test negative..  Patient to have ECT #2 tomorrow.  Patient had c/o back pain which she reports she had for a while prior to hospitalization.  Discussed case with Dr. Hughes, hospitalist, who saw patient and prescribed lidocaine patch and Tylenol.    Patient seen for follow up for bipolar depression, chart reviewed, case discussed in tx team meeting with interdisciplinary staff.  Patient continues to report she is depressed and reports she hopes ECT will help her which she received today.   Patient reports sleeping and eating ok.  Patient is Rx adherent and denies any SE and none are noted.  Denies any SI or HI.  Reports will tell staff if she has thoughts of harming self or others.  Patient teaching done again re: need for NPO from midnight prior to ECT until after ECT with teach back verbalized. VS reviewed  Covid test reordered for Sunday..  Patient to have ECT #3 Monday 9/27.  Patient with back pain which she reports she had for a while prior to hospitalization, prescribed lidocaine patch and Tylenol prn with minimal effect, will monitor.

## 2021-09-24 NOTE — BH INPATIENT PSYCHIATRY PROGRESS NOTE - NSBHASSESSSUMMFT_PSY_ALL_CORE
Patient is a 80-year-old female, , mother, non-caregiver, domiciled alone, in private residence, retired dx of Bipolar I D/O, multiple psychiatric hospitalizations, most recently psychiatrically hospitalized at [facility]: 7/15/20-7/31/20 depression ( discharged medically and readmitted from 8/6/20-8/25/20 for depression, SI and anxiety where she rec’d 10 ECT treatments (no medication changes, was taking Lexapro and Seroquel), history of ECT with good response, currently in outpatient treatment with Dr. Donovan who prescribes Escitalopram 10mg, Amitriptyline 100mg, Nortriptyline 10mg and Seroquel 100mg, one suicide via cutting wrists about one year ago, no history of violence, no legal involvement, no NSSIB and no access to guns/weapons, presents [facility] with her daughter Yen requesting voluntary admission due to worsening sx’s of depression, SI and manic sx’s.  Clinical picture is significant for bipolar depression with worsening symptoms of depression and SI, multiple failed medication trials and recent SI with plan to cut wrist. Patient is seeking admission and is interested in ECT.     Depressed,   c/o constipation, Miralax begun  c/w Lexapro, Nortriptyline, Seroquel.  Taper off Tegretol as per ECT.   Started ECT on 9/22  c/o back pain which she has had prior to hospitalization    Hospitalist ordered Lidocaine patch and Tylenol Patient is a 80-year-old female, , mother, non-caregiver, domiciled alone, in private residence, retired dx of Bipolar I D/O, multiple psychiatric hospitalizations, most recently psychiatrically hospitalized at [facility]: 7/15/20-7/31/20 depression ( discharged medically and readmitted from 8/6/20-8/25/20 for depression, SI and anxiety where she rec’d 10 ECT treatments (no medication changes, was taking Lexapro and Seroquel), history of ECT with good response, currently in outpatient treatment with Dr. Donovan who prescribes Escitalopram 10mg, Amitriptyline 100mg, Nortriptyline 10mg and Seroquel 100mg, one suicide via cutting wrists about one year ago, no history of violence, no legal involvement, no NSSIB and no access to guns/weapons, presents [facility] with her daughter Yen requesting voluntary admission due to worsening sx’s of depression, SI and manic sx’s.  Clinical picture is significant for bipolar depression with worsening symptoms of depression and SI, multiple failed medication trials and recent SI with plan to cut wrist. Patient is seeking admission and is interested in ECT.     Remains acutely depressed, completed ECT #2 today and will continue, requires continued psych hospitalization.    Plan:  c/o constipation, Miralax begun  c/w Lexapro, Nortriptyline, Seroquel.  Taper off Tegretol as per ECT.   Started ECT on 9/22 with ECT #3 scheduled for 9/27, Covid PCR ordered for 9/26  c/o back pain which she has had prior to hospitalization    Hospitalist ordered Lidocaine patch and Tylenol

## 2021-09-25 LAB
APPEARANCE UR: CLEAR — SIGNIFICANT CHANGE UP
BILIRUB UR-MCNC: NEGATIVE — SIGNIFICANT CHANGE UP
COLOR SPEC: YELLOW — SIGNIFICANT CHANGE UP
DIFF PNL FLD: NEGATIVE — SIGNIFICANT CHANGE UP
GLUCOSE UR QL: NEGATIVE — SIGNIFICANT CHANGE UP
KETONES UR-MCNC: ABNORMAL
LEUKOCYTE ESTERASE UR-ACNC: NEGATIVE — SIGNIFICANT CHANGE UP
NITRITE UR-MCNC: NEGATIVE — SIGNIFICANT CHANGE UP
PH UR: 6 — SIGNIFICANT CHANGE UP (ref 5–8)
PROT UR-MCNC: ABNORMAL
SP GR SPEC: 1.02 — SIGNIFICANT CHANGE UP (ref 1–1.05)
UROBILINOGEN FLD QL: SIGNIFICANT CHANGE UP

## 2021-09-25 RX ORDER — IBUPROFEN 200 MG
600 TABLET ORAL EVERY 6 HOURS
Refills: 0 | Status: DISCONTINUED | OUTPATIENT
Start: 2021-09-25 | End: 2021-10-18

## 2021-09-25 RX ADMIN — SENNA PLUS 2 TABLET(S): 8.6 TABLET ORAL at 21:57

## 2021-09-25 RX ADMIN — Medication 1 TABLET(S): at 09:46

## 2021-09-25 RX ADMIN — LIDOCAINE 2 PATCH: 4 CREAM TOPICAL at 15:29

## 2021-09-25 RX ADMIN — Medication 600 MILLIGRAM(S): at 14:13

## 2021-09-25 RX ADMIN — FAMOTIDINE 20 MILLIGRAM(S): 10 INJECTION INTRAVENOUS at 21:41

## 2021-09-25 RX ADMIN — Medication 75 MICROGRAM(S): at 06:36

## 2021-09-25 RX ADMIN — NORTRIPTYLINE HYDROCHLORIDE 10 MILLIGRAM(S): 10 CAPSULE ORAL at 21:40

## 2021-09-25 RX ADMIN — AMLODIPINE BESYLATE 10 MILLIGRAM(S): 2.5 TABLET ORAL at 09:47

## 2021-09-25 RX ADMIN — ESCITALOPRAM OXALATE 10 MILLIGRAM(S): 10 TABLET, FILM COATED ORAL at 09:46

## 2021-09-25 RX ADMIN — FAMOTIDINE 20 MILLIGRAM(S): 10 INJECTION INTRAVENOUS at 09:46

## 2021-09-25 RX ADMIN — POLYETHYLENE GLYCOL 3350 17 GRAM(S): 17 POWDER, FOR SOLUTION ORAL at 09:49

## 2021-09-25 RX ADMIN — QUETIAPINE FUMARATE 125 MILLIGRAM(S): 200 TABLET, FILM COATED ORAL at 21:40

## 2021-09-26 LAB — SARS-COV-2 RNA SPEC QL NAA+PROBE: SIGNIFICANT CHANGE UP

## 2021-09-26 RX ADMIN — AMLODIPINE BESYLATE 10 MILLIGRAM(S): 2.5 TABLET ORAL at 08:54

## 2021-09-26 RX ADMIN — FAMOTIDINE 20 MILLIGRAM(S): 10 INJECTION INTRAVENOUS at 08:50

## 2021-09-26 RX ADMIN — LIDOCAINE 2 PATCH: 4 CREAM TOPICAL at 15:11

## 2021-09-26 RX ADMIN — NORTRIPTYLINE HYDROCHLORIDE 10 MILLIGRAM(S): 10 CAPSULE ORAL at 20:20

## 2021-09-26 RX ADMIN — ESCITALOPRAM OXALATE 10 MILLIGRAM(S): 10 TABLET, FILM COATED ORAL at 08:50

## 2021-09-26 RX ADMIN — Medication 75 MICROGRAM(S): at 06:35

## 2021-09-26 RX ADMIN — Medication 1 TABLET(S): at 08:55

## 2021-09-26 RX ADMIN — FAMOTIDINE 20 MILLIGRAM(S): 10 INJECTION INTRAVENOUS at 20:20

## 2021-09-26 RX ADMIN — QUETIAPINE FUMARATE 125 MILLIGRAM(S): 200 TABLET, FILM COATED ORAL at 20:20

## 2021-09-27 PROCEDURE — 99232 SBSQ HOSP IP/OBS MODERATE 35: CPT | Mod: 25

## 2021-09-27 PROCEDURE — 90870 ELECTROCONVULSIVE THERAPY: CPT

## 2021-09-27 RX ADMIN — LIDOCAINE 2 PATCH: 4 CREAM TOPICAL at 16:41

## 2021-09-27 RX ADMIN — FAMOTIDINE 20 MILLIGRAM(S): 10 INJECTION INTRAVENOUS at 20:24

## 2021-09-27 RX ADMIN — ESCITALOPRAM OXALATE 10 MILLIGRAM(S): 10 TABLET, FILM COATED ORAL at 09:30

## 2021-09-27 RX ADMIN — AMLODIPINE BESYLATE 10 MILLIGRAM(S): 2.5 TABLET ORAL at 09:30

## 2021-09-27 RX ADMIN — Medication 75 MICROGRAM(S): at 06:00

## 2021-09-27 RX ADMIN — FAMOTIDINE 20 MILLIGRAM(S): 10 INJECTION INTRAVENOUS at 09:30

## 2021-09-27 RX ADMIN — QUETIAPINE FUMARATE 125 MILLIGRAM(S): 200 TABLET, FILM COATED ORAL at 20:24

## 2021-09-27 RX ADMIN — NORTRIPTYLINE HYDROCHLORIDE 10 MILLIGRAM(S): 10 CAPSULE ORAL at 20:25

## 2021-09-27 RX ADMIN — Medication 1 TABLET(S): at 09:30

## 2021-09-27 NOTE — BH INPATIENT PSYCHIATRY PROGRESS NOTE - NSBHCHARTREVIEWVS_PSY_A_CORE FT
Vital Signs Last 24 Hrs  T(C): 36.8 (09-27-21 @ 08:15), Max: 36.8 (09-27-21 @ 08:15)  T(F): 98.3 (09-27-21 @ 08:15), Max: 98.3 (09-27-21 @ 08:15)  HR: --  BP: --  BP(mean): --  RR: 19 (09-27-21 @ 08:15) (19 - 19)  SpO2: --    Orthostatic VS  09-27-21 @ 08:15  Lying BP: --/-- HR: --  Sitting BP: 142/64 HR: 79  Standing BP: 130/67 HR: 95  Site: --  Mode: --  Orthostatic VS  09-26-21 @ 08:13  Lying BP: --/-- HR: --  Sitting BP: 134/65 HR: 73  Standing BP: 135/62 HR: 87  Site: --  Mode: --

## 2021-09-27 NOTE — BH INPATIENT PSYCHIATRY PROGRESS NOTE - NSBHASSESSSUMMFT_PSY_ALL_CORE
Patient is a 80-year-old female, , mother, non-caregiver, domiciled alone, in private residence, retired dx of Bipolar I D/O, multiple psychiatric hospitalizations, most recently psychiatrically hospitalized at [facility]: 7/15/20-7/31/20 depression ( discharged medically and readmitted from 8/6/20-8/25/20 for depression, SI and anxiety where she rec’d 10 ECT treatments (no medication changes, was taking Lexapro and Seroquel), history of ECT with good response, currently in outpatient treatment with Dr. Donovan who prescribes Escitalopram 10mg, Amitriptyline 100mg, Nortriptyline 10mg and Seroquel 100mg, one suicide via cutting wrists about one year ago, no history of violence, no legal involvement, no NSSIB and no access to guns/weapons, presents [facility] with her daughter Yen requesting voluntary admission due to worsening sx’s of depression, SI and manic sx’s.  Clinical picture is significant for bipolar depression with worsening symptoms of depression and SI, multiple failed medication trials and recent SI with plan to cut wrist. Patient is seeking admission and is interested in ETC.     Patient remains depressed with poor sleep and appetite. She endorses passive SI with no intent or plan. She is for ECT#3 today.    c/w Lexapro, Nortriptyline, Seroquel.  ECT#4 scheduled for 9/29 Meconium in amniotic fluid

## 2021-09-27 NOTE — BH INPATIENT PSYCHIATRY PROGRESS NOTE - NSBHFUPINTERVALHXFT_PSY_A_CORE
Chart reviewed and case discussed with treatment team. No events reported over the weekend. Sleep is "terrible" and appetite is "worse". Patient reported a good visit with her daughter over the weekend. Patient initially had an irritable edge to her and stated she didn't feel like talking but then was engaged in interview. She continues to endorse feeling hopeless, helpless, low energy with passive SI. She is for ECT today. Patient is compliant with medications, no adverse effects reported.

## 2021-09-28 PROCEDURE — 99232 SBSQ HOSP IP/OBS MODERATE 35: CPT

## 2021-09-28 RX ADMIN — FAMOTIDINE 20 MILLIGRAM(S): 10 INJECTION INTRAVENOUS at 09:10

## 2021-09-28 RX ADMIN — LIDOCAINE 2 PATCH: 4 CREAM TOPICAL at 19:47

## 2021-09-28 RX ADMIN — LIDOCAINE 2 PATCH: 4 CREAM TOPICAL at 15:47

## 2021-09-28 RX ADMIN — FAMOTIDINE 20 MILLIGRAM(S): 10 INJECTION INTRAVENOUS at 21:06

## 2021-09-28 RX ADMIN — AMLODIPINE BESYLATE 10 MILLIGRAM(S): 2.5 TABLET ORAL at 09:11

## 2021-09-28 RX ADMIN — QUETIAPINE FUMARATE 125 MILLIGRAM(S): 200 TABLET, FILM COATED ORAL at 21:06

## 2021-09-28 RX ADMIN — NORTRIPTYLINE HYDROCHLORIDE 10 MILLIGRAM(S): 10 CAPSULE ORAL at 21:06

## 2021-09-28 RX ADMIN — ESCITALOPRAM OXALATE 10 MILLIGRAM(S): 10 TABLET, FILM COATED ORAL at 09:10

## 2021-09-28 RX ADMIN — Medication 1 TABLET(S): at 09:11

## 2021-09-28 RX ADMIN — Medication 75 MICROGRAM(S): at 06:59

## 2021-09-28 NOTE — BH INPATIENT PSYCHIATRY PROGRESS NOTE - NSBHFUPINTERVALHXFT_PSY_A_CORE
Patient seen for follow up for bipolar depression, chart reviewed, case discussed in tx team meeting with interdisciplinary staff.  Patient continues to report she is depressed and reports she hopes ECT will help her which she received yesterday and was well tolerated.  Patient reports sleeping and eating ok.  Patient is Rx adherent and denies any SE and none are noted.  Denies any SI or HI.  Reports will tell staff if she has thoughts of harming self or others.  Patient teaching done again re: need for NPO from midnight prior to ECT until after ECT with teach back verbalized. VS reviewed  Patient to have ECT #4 tomorrow Wednesday 9/29.  Patient with no somatic complaints, prescribed lidocaine patch and Tylenol prn back pain, will continue to monitor.

## 2021-09-28 NOTE — BH INPATIENT PSYCHIATRY PROGRESS NOTE - NSBHCHARTREVIEWVS_PSY_A_CORE FT
Vital Signs Last 24 Hrs  T(C): 36.3 (09-28-21 @ 20:33), Max: 36.9 (09-28-21 @ 08:27)  T(F): 97.4 (09-28-21 @ 20:33), Max: 98.4 (09-28-21 @ 08:27)  HR: --  BP: --  BP(mean): --  RR: 17 (09-28-21 @ 08:27) (17 - 17)  SpO2: --    Orthostatic VS  09-28-21 @ 08:27  Lying BP: --/-- HR: --  Sitting BP: 132/66 HR: 85  Standing BP: 130/60 HR: 97  Site: --  Mode: --  Orthostatic VS  09-27-21 @ 08:15  Lying BP: --/-- HR: --  Sitting BP: 142/64 HR: 79  Standing BP: 130/67 HR: 95  Site: --  Mode: --

## 2021-09-28 NOTE — BH INPATIENT PSYCHIATRY PROGRESS NOTE - NSBHASSESSSUMMFT_PSY_ALL_CORE
Patient is a 80-year-old female, , mother, non-caregiver, domiciled alone, in private residence, retired dx of Bipolar I D/O, multiple psychiatric hospitalizations, most recently psychiatrically hospitalized at [facility]: 7/15/20-7/31/20 depression ( discharged medically and readmitted from 8/6/20-8/25/20 for depression, SI and anxiety where she rec’d 10 ECT treatments (no medication changes, was taking Lexapro and Seroquel), history of ECT with good response, currently in outpatient treatment with Dr. Donovan who prescribes Escitalopram 10mg, Amitriptyline 100mg, Nortriptyline 10mg and Seroquel 100mg, one suicide via cutting wrists about one year ago, no history of violence, no legal involvement, no NSSIB and no access to guns/weapons, presents [facility] with her daughter Yen requesting voluntary admission due to worsening sx’s of depression, SI and manic sx’s.  Clinical picture is significant for bipolar depression with worsening symptoms of depression and SI, multiple failed medication trials and recent SI with plan to cut wrist. Patient is seeking admission and is interested in ECT.     Remains acutely depressed, completed ECT #3 yesterday and will continue, requires continued psych hospitalization.    Plan:  c/o constipation, Miralax continued.  c/w Lexapro, Nortriptyline, Seroquel.  Started ECT on 9/22 with ECT #4 scheduled for 9/29.  c/o back pain which she has had prior to hospitalization    Hospitalist ordered Lidocaine patch and Tylenol  Group program, milieu therapy.  Routine checks, no indication for constant observation as able to contract for safety in a locked, supervised setting.

## 2021-09-29 PROCEDURE — 90870 ELECTROCONVULSIVE THERAPY: CPT

## 2021-09-29 PROCEDURE — 99232 SBSQ HOSP IP/OBS MODERATE 35: CPT | Mod: 25

## 2021-09-29 RX ADMIN — Medication 1 TABLET(S): at 08:35

## 2021-09-29 RX ADMIN — Medication 75 MICROGRAM(S): at 06:22

## 2021-09-29 RX ADMIN — AMLODIPINE BESYLATE 10 MILLIGRAM(S): 2.5 TABLET ORAL at 08:35

## 2021-09-29 RX ADMIN — FAMOTIDINE 20 MILLIGRAM(S): 10 INJECTION INTRAVENOUS at 21:21

## 2021-09-29 RX ADMIN — ESCITALOPRAM OXALATE 10 MILLIGRAM(S): 10 TABLET, FILM COATED ORAL at 08:35

## 2021-09-29 RX ADMIN — FAMOTIDINE 20 MILLIGRAM(S): 10 INJECTION INTRAVENOUS at 08:35

## 2021-09-29 RX ADMIN — LIDOCAINE 2 PATCH: 4 CREAM TOPICAL at 17:32

## 2021-09-29 RX ADMIN — QUETIAPINE FUMARATE 125 MILLIGRAM(S): 200 TABLET, FILM COATED ORAL at 21:21

## 2021-09-29 RX ADMIN — NORTRIPTYLINE HYDROCHLORIDE 10 MILLIGRAM(S): 10 CAPSULE ORAL at 21:21

## 2021-09-29 NOTE — BH INPATIENT PSYCHIATRY PROGRESS NOTE - NSBHCHARTREVIEWVS_PSY_A_CORE FT
Vital Signs Last 24 Hrs  T(C): 37.1 (09-29-21 @ 13:36), Max: 37.1 (09-29-21 @ 07:46)  T(F): 98.8 (09-29-21 @ 13:36), Max: 98.8 (09-29-21 @ 07:46)  HR: 73 (09-29-21 @ 13:20) (67 - 80)  BP: 142/62 (09-29-21 @ 13:20) (142/62 - 166/58)  BP(mean): --  RR: 16 (09-29-21 @ 13:36) (15 - 22)  SpO2: 96% (09-29-21 @ 13:20) (96% - 100%)    Orthostatic VS  09-29-21 @ 13:36  Lying BP: --/-- HR: --  Sitting BP: 150/60 HR: 76  Standing BP: 156/65 HR: 77  Site: --  Mode: --  Orthostatic VS  09-29-21 @ 07:46  Lying BP: --/-- HR: --  Sitting BP: 144/70 HR: 89  Standing BP: 133/78 HR: 80  Site: --  Mode: --  Orthostatic VS  09-28-21 @ 08:27  Lying BP: --/-- HR: --  Sitting BP: 132/66 HR: 85  Standing BP: 130/60 HR: 97  Site: --  Mode: --   Vital Signs Last 24 Hrs  T(C): 36.9 (09-29-21 @ 18:33), Max: 37.1 (09-29-21 @ 07:46)  T(F): 98.5 (09-29-21 @ 18:33), Max: 98.8 (09-29-21 @ 07:46)  HR: 73 (09-29-21 @ 13:20) (67 - 80)  BP: 142/62 (09-29-21 @ 13:20) (142/62 - 166/58)  BP(mean): --  RR: 16 (09-29-21 @ 13:36) (15 - 22)  SpO2: 96% (09-29-21 @ 13:20) (96% - 100%)    Orthostatic VS  09-29-21 @ 13:36  Lying BP: --/-- HR: --  Sitting BP: 150/60 HR: 76  Standing BP: 156/65 HR: 77  Site: --  Mode: --  Orthostatic VS  09-29-21 @ 07:46  Lying BP: --/-- HR: --  Sitting BP: 144/70 HR: 89  Standing BP: 133/78 HR: 80  Site: --  Mode: --  Orthostatic VS  09-28-21 @ 08:27  Lying BP: --/-- HR: --  Sitting BP: 132/66 HR: 85  Standing BP: 130/60 HR: 97  Site: --  Mode: --

## 2021-09-29 NOTE — BH INPATIENT PSYCHIATRY PROGRESS NOTE - NSBHASSESSSUMMFT_PSY_ALL_CORE
Patient is a 80-year-old female, , mother, non-caregiver, domiciled alone, in private residence, retired dx of Bipolar I D/O, multiple psychiatric hospitalizations, most recently psychiatrically hospitalized at [facility]: 7/15/20-7/31/20 depression ( discharged medically and readmitted from 8/6/20-8/25/20 for depression, SI and anxiety where she rec’d 10 ECT treatments (no medication changes, was taking Lexapro and Seroquel), history of ECT with good response, currently in outpatient treatment with Dr. Donovan who prescribes Escitalopram 10mg, Amitriptyline 100mg, Nortriptyline 10mg and Seroquel 100mg, one suicide via cutting wrists about one year ago, no history of violence, no legal involvement, no NSSIB and no access to guns/weapons, presents [facility] with her daughter Yen requesting voluntary admission due to worsening sx’s of depression, SI and manic sx’s.  Clinical picture is significant for bipolar depression with worsening symptoms of depression and SI, multiple failed medication trials and recent SI with plan to cut wrist. Patient is seeking admission and is interested in ECT.     Remains acutely depressed, completed ECT #3 yesterday and will continue, requires continued psych hospitalization.    Plan:  c/o constipation, Miralax continued.  c/w Lexapro, Nortriptyline, Seroquel.  Started ECT on 9/22 with ECT #4 scheduled for 9/29.  c/o back pain which she has had prior to hospitalization    Hospitalist ordered Lidocaine patch and Tylenol  Group program, milieu therapy.  Routine checks, no indication for constant observation as able to contract for safety in a locked, supervised setting. Patient is a 80-year-old female, , mother, non-caregiver, domiciled alone, in private residence, retired dx of Bipolar I D/O, multiple psychiatric hospitalizations, most recently psychiatrically hospitalized at [facility]: 7/15/20-7/31/20 depression ( discharged medically and readmitted from 8/6/20-8/25/20 for depression, SI and anxiety where she rec’d 10 ECT treatments (no medication changes, was taking Lexapro and Seroquel), history of ECT with good response, currently in outpatient treatment with Dr. Donovan who prescribes Escitalopram 10mg, Amitriptyline 100mg, Nortriptyline 10mg and Seroquel 100mg, one suicide via cutting wrists about one year ago, no history of violence, no legal involvement, no NSSIB and no access to guns/weapons, presents [facility] with her daughter Yen requesting voluntary admission due to worsening sx’s of depression, SI and manic sx’s.  Clinical picture is significant for bipolar depression with worsening symptoms of depression and SI, multiple failed medication trials and recent SI with plan to cut wrist. Patient is seeking admission and is interested in ECT.     Remains acutely depressed, completed ECT #4 today and will continue, requires continued psych hospitalization.    Plan:  c/o constipation, Miralax continued.  c/w Lexapro, Nortriptyline, Seroquel.  Started ECT on 9/22 with ECT #4 completed today on 9/29, next treatment on Friday 10/1.  c/o back pain which she has had prior to hospitalization, Lidocaine patch and Tylenol prn provided with benefit.  Group program, milieu therapy - limited involvement, encouraged patient to engage in treatment.  Routine checks, no indication for constant observation as able to contract for safety in a locked, supervised setting.

## 2021-09-29 NOTE — BH INPATIENT PSYCHIATRY PROGRESS NOTE - NSBHFUPINTERVALHXFT_PSY_A_CORE
Patient seen for follow up for bipolar depression, chart reviewed, case discussed in tx team meeting with interdisciplinary staff.  Patient continues to report she is depressed and reports she hopes ECT will help her which she received yesterday and was well tolerated.  Patient reports sleeping and eating ok.  Patient is Rx adherent and denies any SE and none are noted.  Denies any SI or HI.  Reports will tell staff if she has thoughts of harming self or others.  Patient teaching done again re: need for NPO from midnight prior to ECT until after ECT with teach back verbalized. VS reviewed  Patient to have ECT #4 tomorrow Wednesday 9/29.  Patient with no somatic complaints, prescribed lidocaine patch and Tylenol prn back pain, will continue to monitor.    Patient seen for follow up for bipolar depression, chart reviewed, case discussed in tx team meeting with interdisciplinary staff.  Patient continues to report she is depressed and reports she hopes ECT will help her which she received today and was well tolerated.  Appears a bit brighter following treatment  Patient reports sleeping and eating ok.  Patient is Rx adherent and denies any SE and none are noted.  Denies any SI or HI.  Reports will tell staff if she has thoughts of harming self or others.  VS reviewed  Patient completed ECT #4 today.  Patient with no somatic complaints, prescribed lidocaine patch and Tylenol prn back pain, will continue to monitor.

## 2021-09-30 PROCEDURE — 99232 SBSQ HOSP IP/OBS MODERATE 35: CPT

## 2021-09-30 RX ADMIN — FAMOTIDINE 20 MILLIGRAM(S): 10 INJECTION INTRAVENOUS at 09:40

## 2021-09-30 RX ADMIN — ESCITALOPRAM OXALATE 10 MILLIGRAM(S): 10 TABLET, FILM COATED ORAL at 09:40

## 2021-09-30 RX ADMIN — QUETIAPINE FUMARATE 125 MILLIGRAM(S): 200 TABLET, FILM COATED ORAL at 21:40

## 2021-09-30 RX ADMIN — FAMOTIDINE 20 MILLIGRAM(S): 10 INJECTION INTRAVENOUS at 21:39

## 2021-09-30 RX ADMIN — LIDOCAINE 2 PATCH: 4 CREAM TOPICAL at 16:11

## 2021-09-30 RX ADMIN — Medication 75 MICROGRAM(S): at 06:03

## 2021-09-30 RX ADMIN — NORTRIPTYLINE HYDROCHLORIDE 10 MILLIGRAM(S): 10 CAPSULE ORAL at 21:39

## 2021-09-30 RX ADMIN — Medication 1 TABLET(S): at 09:40

## 2021-09-30 RX ADMIN — AMLODIPINE BESYLATE 10 MILLIGRAM(S): 2.5 TABLET ORAL at 09:40

## 2021-09-30 NOTE — BH INPATIENT PSYCHIATRY PROGRESS NOTE - NSBHFUPINTERVALHXFT_PSY_A_CORE
Patient seen for follow up for bipolar depression, chart reviewed, case discussed in tx team meeting with interdisciplinary staff and with Dr. Schmidt.   Patient continues to report she is depressed and reports she hopes ECT will help her.  She reports she is ok with ECT tomorrow. Teaching done re: need to maintain NPO from midnight tonight until after ECT tomorrow, with teach back and compliance verbalzied.   Appears a bit brighter.  Patient reports sleeping and eating ok.  Patient is Rx adherent and denies any SE and none are noted.  Denies any HI.  Reports occasional passive SI, but reports she pushes the thoughts away.  Reports will tell staff if she has thoughts of harming self or others.  VS reviewed    Patient with no somatic complaints, prescribed lidocaine patch and Tylenol prn back pain, will continue to monitor.

## 2021-09-30 NOTE — BH INPATIENT PSYCHIATRY PROGRESS NOTE - NSBHCHARTREVIEWVS_PSY_A_CORE FT
Vital Signs Last 24 Hrs  T(C): 37 (09-30-21 @ 07:37), Max: 37 (09-30-21 @ 07:37)  T(F): 98.6 (09-30-21 @ 07:37), Max: 98.6 (09-30-21 @ 07:37)  HR: --  BP: --  BP(mean): --  RR: 17 (09-30-21 @ 07:37) (17 - 17)  SpO2: --    Orthostatic VS  09-30-21 @ 07:37  Lying BP: --/-- HR: --  Sitting BP: 135/63 HR: 68  Standing BP: 132/59 HR: 74  Site: --  Mode: --  Orthostatic VS  09-29-21 @ 13:36  Lying BP: --/-- HR: --  Sitting BP: 150/60 HR: 76  Standing BP: 156/65 HR: 77  Site: --  Mode: --  Orthostatic VS  09-29-21 @ 07:46  Lying BP: --/-- HR: --  Sitting BP: 144/70 HR: 89  Standing BP: 133/78 HR: 80  Site: --  Mode: --

## 2021-09-30 NOTE — BH INPATIENT PSYCHIATRY PROGRESS NOTE - NSBHASSESSSUMMFT_PSY_ALL_CORE
Patient is a 80-year-old female, , mother, non-caregiver, domiciled alone, in private residence, retired dx of Bipolar I D/O, multiple psychiatric hospitalizations, most recently psychiatrically hospitalized at [facility]: 7/15/20-7/31/20 depression ( discharged medically and readmitted from 8/6/20-8/25/20 for depression, SI and anxiety where she rec’d 10 ECT treatments (no medication changes, was taking Lexapro and Seroquel), history of ECT with good response, currently in outpatient treatment with Dr. Donovan who prescribes Escitalopram 10mg, Amitriptyline 100mg, Nortriptyline 10mg and Seroquel 100mg, one suicide via cutting wrists about one year ago, no history of violence, no legal involvement, no NSSIB and no access to guns/weapons, presents [facility] with her daughter Yen requesting voluntary admission due to worsening sx’s of depression, SI and manic sx’s.  Clinical picture is significant for bipolar depression with worsening symptoms of depression and SI, multiple failed medication trials and recent SI with plan to cut wrist. Patient is seeking admission and is interested in ECT.     Remains acutely depressed,  is for ECT #5 tomorrow    Plan:  c/o constipation, Miralax continued.  c/w Lexapro, Nortriptyline, Seroquel.  Started ECT on 9/22 with ECT #4 completed today on 9/29, next treatment on Friday 10/1.  c/o back pain which she has had prior to hospitalization, Lidocaine patch and Tylenol prn provided with benefit.  Group program, milieu therapy - limited involvement, encouraged patient to engage in treatment.  Routine checks, no indication for constant observation as able to contract for safety in a locked, supervised setting.

## 2021-10-01 PROCEDURE — 99232 SBSQ HOSP IP/OBS MODERATE 35: CPT | Mod: 25

## 2021-10-01 PROCEDURE — 90870 ELECTROCONVULSIVE THERAPY: CPT

## 2021-10-01 RX ADMIN — Medication 1 TABLET(S): at 09:02

## 2021-10-01 RX ADMIN — NORTRIPTYLINE HYDROCHLORIDE 10 MILLIGRAM(S): 10 CAPSULE ORAL at 21:23

## 2021-10-01 RX ADMIN — LIDOCAINE 2 PATCH: 4 CREAM TOPICAL at 15:34

## 2021-10-01 RX ADMIN — FAMOTIDINE 20 MILLIGRAM(S): 10 INJECTION INTRAVENOUS at 21:23

## 2021-10-01 RX ADMIN — ESCITALOPRAM OXALATE 10 MILLIGRAM(S): 10 TABLET, FILM COATED ORAL at 09:03

## 2021-10-01 RX ADMIN — QUETIAPINE FUMARATE 125 MILLIGRAM(S): 200 TABLET, FILM COATED ORAL at 21:23

## 2021-10-01 RX ADMIN — AMLODIPINE BESYLATE 10 MILLIGRAM(S): 2.5 TABLET ORAL at 09:03

## 2021-10-01 RX ADMIN — Medication 75 MICROGRAM(S): at 06:30

## 2021-10-01 RX ADMIN — FAMOTIDINE 20 MILLIGRAM(S): 10 INJECTION INTRAVENOUS at 09:02

## 2021-10-01 NOTE — BH INPATIENT PSYCHIATRY PROGRESS NOTE - NSBHASSESSSUMMFT_PSY_ALL_CORE
Patient is a 80-year-old female, , mother, non-caregiver, domiciled alone, in private residence, retired dx of Bipolar I D/O, multiple psychiatric hospitalizations, most recently psychiatrically hospitalized at [facility]: 7/15/20-7/31/20 depression ( discharged medically and readmitted from 8/6/20-8/25/20 for depression, SI and anxiety where she rec’d 10 ECT treatments (no medication changes, was taking Lexapro and Seroquel), history of ECT with good response, currently in outpatient treatment with Dr. Donovan who prescribes Escitalopram 10mg, Amitriptyline 100mg, Nortriptyline 10mg and Seroquel 100mg, one suicide via cutting wrists about one year ago, no history of violence, no legal involvement, no NSSIB and no access to guns/weapons, presents [facility] with her daughter Yen requesting voluntary admission due to worsening sx’s of depression, SI and manic sx’s.  Clinical picture is significant for bipolar depression with worsening symptoms of depression and SI, multiple failed medication trials and recent SI with plan to cut wrist. Patient is seeking admission and is interested in ECT.     Remains acutely depressed,  is for ECT #6 on Monday    Plan:  c/o constipation, Miralax continued.  c/w Lexapro, Nortriptyline, Seroquel.  Started ECT on 9/22 with ECT #4 completed today on 9/29, next treatment on Friday 10/1.  c/o back pain which she has had prior to hospitalization, Lidocaine patch and Tylenol prn provided with benefit.  Group program, milieu therapy - limited involvement, encouraged patient to engage in treatment.  Routine checks, no indication for constant observation as able to contract for safety in a locked, supervised setting.

## 2021-10-01 NOTE — BH INPATIENT PSYCHIATRY PROGRESS NOTE - NSBHCHARTREVIEWVS_PSY_A_CORE FT
Vital Signs Last 24 Hrs  T(C): 37 (10-01-21 @ 13:43), Max: 37.3 (09-30-21 @ 18:34)  T(F): 98.6 (10-01-21 @ 13:43), Max: 99.1 (09-30-21 @ 18:34)  HR: 72 (10-01-21 @ 13:35) (66 - 94)  BP: 148/59 (10-01-21 @ 13:35) (146/58 - 173/75)  BP(mean): --  RR: 17 (10-01-21 @ 13:35) (16 - 22)  SpO2: 98% (10-01-21 @ 13:35) (94% - 100%)    Orthostatic VS  10-01-21 @ 13:43  Lying BP: --/-- HR: --  Sitting BP: 137/86 HR: 95  Standing BP: --/-- HR: --  Site: --  Mode: --  Orthostatic VS  10-01-21 @ 08:26  Lying BP: --/-- HR: --  Sitting BP: 138/73 HR: 80  Standing BP: 145/68 HR: 91  Site: --  Mode: --  Orthostatic VS  09-30-21 @ 07:37  Lying BP: --/-- HR: --  Sitting BP: 135/63 HR: 68  Standing BP: 132/59 HR: 74  Site: --  Mode: --

## 2021-10-01 NOTE — BH INPATIENT PSYCHIATRY PROGRESS NOTE - NSBHFUPINTERVALHXFT_PSY_A_CORE
Patient seen for follow up for bipolar depression, chart reviewed, case discussed in tx team meeting with interdisciplinary staff and with Dr. Schmidt. Patient interviewed prior to ECT.   Patient continues to report she is depressed and reports she hopes ECT will help her.  She reports she is ok with ECT today. Teaching done re: need to maintain NPO from midnight  until after ECT today, with teach back and compliance verbalized.   Appears a bit brighter.  Patient has been attending groups on unit and is less isolative.  Patient reports sleeping and eating ok.  Patient is Rx adherent and denies any SE and none are noted.  Denies any HI or SI.   Reports will tell staff if she has thoughts of harming self or others.  VS reviewed    Patient with no somatic complaints, prescribed lidocaine patch and Tylenol prn back pain, will continue to monitor.

## 2021-10-02 RX ADMIN — Medication 75 MICROGRAM(S): at 06:32

## 2021-10-02 RX ADMIN — NORTRIPTYLINE HYDROCHLORIDE 10 MILLIGRAM(S): 10 CAPSULE ORAL at 21:06

## 2021-10-02 RX ADMIN — FAMOTIDINE 20 MILLIGRAM(S): 10 INJECTION INTRAVENOUS at 21:06

## 2021-10-02 RX ADMIN — FAMOTIDINE 20 MILLIGRAM(S): 10 INJECTION INTRAVENOUS at 09:12

## 2021-10-02 RX ADMIN — ESCITALOPRAM OXALATE 10 MILLIGRAM(S): 10 TABLET, FILM COATED ORAL at 09:12

## 2021-10-02 RX ADMIN — QUETIAPINE FUMARATE 125 MILLIGRAM(S): 200 TABLET, FILM COATED ORAL at 21:06

## 2021-10-02 RX ADMIN — AMLODIPINE BESYLATE 10 MILLIGRAM(S): 2.5 TABLET ORAL at 09:12

## 2021-10-02 RX ADMIN — Medication 1 TABLET(S): at 09:12

## 2021-10-03 LAB — SARS-COV-2 RNA SPEC QL NAA+PROBE: SIGNIFICANT CHANGE UP

## 2021-10-03 RX ADMIN — FAMOTIDINE 20 MILLIGRAM(S): 10 INJECTION INTRAVENOUS at 21:13

## 2021-10-03 RX ADMIN — QUETIAPINE FUMARATE 125 MILLIGRAM(S): 200 TABLET, FILM COATED ORAL at 21:13

## 2021-10-03 RX ADMIN — ESCITALOPRAM OXALATE 10 MILLIGRAM(S): 10 TABLET, FILM COATED ORAL at 09:30

## 2021-10-03 RX ADMIN — LIDOCAINE 2 PATCH: 4 CREAM TOPICAL at 17:01

## 2021-10-03 RX ADMIN — NORTRIPTYLINE HYDROCHLORIDE 10 MILLIGRAM(S): 10 CAPSULE ORAL at 21:12

## 2021-10-03 RX ADMIN — Medication 1 TABLET(S): at 09:29

## 2021-10-03 RX ADMIN — INFLUENZA VIRUS VACCINE 0.7 MILLILITER(S): 15; 15; 15; 15 SUSPENSION INTRAMUSCULAR at 17:29

## 2021-10-03 RX ADMIN — LIDOCAINE 2 PATCH: 4 CREAM TOPICAL at 19:00

## 2021-10-03 RX ADMIN — FAMOTIDINE 20 MILLIGRAM(S): 10 INJECTION INTRAVENOUS at 09:30

## 2021-10-03 RX ADMIN — Medication 75 MICROGRAM(S): at 06:51

## 2021-10-03 RX ADMIN — AMLODIPINE BESYLATE 10 MILLIGRAM(S): 2.5 TABLET ORAL at 09:29

## 2021-10-04 PROCEDURE — 99232 SBSQ HOSP IP/OBS MODERATE 35: CPT | Mod: 25

## 2021-10-04 PROCEDURE — 90870 ELECTROCONVULSIVE THERAPY: CPT

## 2021-10-04 RX ADMIN — FAMOTIDINE 20 MILLIGRAM(S): 10 INJECTION INTRAVENOUS at 09:12

## 2021-10-04 RX ADMIN — Medication 1 TABLET(S): at 09:13

## 2021-10-04 RX ADMIN — LIDOCAINE 2 PATCH: 4 CREAM TOPICAL at 15:42

## 2021-10-04 RX ADMIN — LIDOCAINE 2 PATCH: 4 CREAM TOPICAL at 06:26

## 2021-10-04 RX ADMIN — FAMOTIDINE 20 MILLIGRAM(S): 10 INJECTION INTRAVENOUS at 21:20

## 2021-10-04 RX ADMIN — NORTRIPTYLINE HYDROCHLORIDE 10 MILLIGRAM(S): 10 CAPSULE ORAL at 21:20

## 2021-10-04 RX ADMIN — AMLODIPINE BESYLATE 10 MILLIGRAM(S): 2.5 TABLET ORAL at 09:13

## 2021-10-04 RX ADMIN — ESCITALOPRAM OXALATE 10 MILLIGRAM(S): 10 TABLET, FILM COATED ORAL at 09:13

## 2021-10-04 RX ADMIN — Medication 75 MICROGRAM(S): at 06:22

## 2021-10-04 RX ADMIN — QUETIAPINE FUMARATE 125 MILLIGRAM(S): 200 TABLET, FILM COATED ORAL at 21:20

## 2021-10-04 NOTE — BH INPATIENT PSYCHIATRY PROGRESS NOTE - NSBHASSESSSUMMFT_PSY_ALL_CORE
Patient is a 80-year-old female, , mother, non-caregiver, domiciled alone, in private residence, retired dx of Bipolar I D/O, multiple psychiatric hospitalizations, most recently psychiatrically hospitalized at [facility]: 7/15/20-7/31/20 depression ( discharged medically and readmitted from 8/6/20-8/25/20 for depression, SI and anxiety where she rec’d 10 ECT treatments (no medication changes, was taking Lexapro and Seroquel), history of ECT with good response, currently in outpatient treatment with Dr. Donovan who prescribes Escitalopram 10mg, Amitriptyline 100mg, Nortriptyline 10mg and Seroquel 100mg, one suicide via cutting wrists about one year ago, no history of violence, no legal involvement, no NSSIB and no access to guns/weapons, presents [facility] with her daughter Yen requesting voluntary admission due to worsening sx’s of depression, SI and manic sx’s.  Clinical picture is significant for bipolar depression with worsening symptoms of depression and SI, multiple failed medication trials and recent SI with plan to cut wrist. Patient is seeking admission and is interested in ECT.     Remains acutely depressed,  is for ECT #7 on Wednesday    Plan:  c/o constipation, Miralax continued.  c/w Lexapro, Nortriptyline, Seroquel.  Started ECT on 9/22 with ECT #4 completed today on 9/29, next treatment on Friday 10/1.  c/o back pain which she has had prior to hospitalization, Lidocaine patch and Tylenol prn provided with benefit.  Group program, milieu therapy - limited involvement, encouraged patient to engage in treatment.  Routine checks, no indication for constant observation as able to contract for safety in a locked, supervised setting.

## 2021-10-04 NOTE — ECT TREATMENT NOTE - NSECTCPTCODE_PSY_ALL_CORE
32292 (ECT-Electroconvulsive Therapy)
60773 (ECT-Electroconvulsive Therapy)
18479 (ECT-Electroconvulsive Therapy)
10565 (ECT-Electroconvulsive Therapy)
59473 (ECT-Electroconvulsive Therapy)
55220 (ECT-Electroconvulsive Therapy)

## 2021-10-04 NOTE — ECT TREATMENT NOTE - NSECTTXPERFDATETIME_PSY_ALL_CORE
01-Oct-2021 12:47
29-Sep-2021 12:42
27-Sep-2021 15:13
22-Sep-2021 17:30
04-Oct-2021 16:42
24-Sep-2021 12:09

## 2021-10-04 NOTE — BH INPATIENT PSYCHIATRY PROGRESS NOTE - NSBHFUPINTERVALHXFT_PSY_A_CORE
Patient seen for follow up for bipolar depression, chart reviewed, case discussed in tx team meeting with interdisciplinary staff and with Dr. Schmidt. Patient interviewed prior to ECT.   Patient reports she is less depressed and reports she hopes ECT will help her.  She reports she is ok with ECT today. Teaching done re: need to maintain NPO from midnight  until after ECT today, with teach back and compliance verbalized.   Appears brighter.  Patient has been attending groups on unit and is less isolative.  Patient reports sleeping and eating ok.  Patient is Rx adherent and denies any SE and none are noted.  Denies any HI or SI.   Reports will tell staff if she has thoughts of harming self or others.  VS reviewed    Patient with no somatic complaints, prescribed lidocaine patch and Tylenol prn back  pain, will continue to monitor.  spoke with patient's daughter who reports she hopes patient will benefit from ECT. Provided support to daughter

## 2021-10-04 NOTE — BH INPATIENT PSYCHIATRY PROGRESS NOTE - NSBHCHARTREVIEWVS_PSY_A_CORE FT
Vital Signs Last 24 Hrs  T(C): 37.1 (10-04-21 @ 16:41), Max: 37.5 (10-04-21 @ 05:42)  T(F): 98.7 (10-04-21 @ 16:41), Max: 99.5 (10-04-21 @ 05:42)  HR: 87 (10-04-21 @ 05:42) (87 - 108)  BP: 173/76 (10-04-21 @ 05:42) (110/59 - 173/76)  BP(mean): --  RR: 18 (10-04-21 @ 16:41) (16 - 18)  SpO2: 98% (10-04-21 @ 05:42) (98% - 98%)    Orthostatic VS  10-04-21 @ 08:01  Lying BP: --/-- HR: --  Sitting BP: 144/63 HR: 78  Standing BP: 132/74 HR: 93  Site: --  Mode: --  Orthostatic VS  10-03-21 @ 08:31  Lying BP: --/-- HR: --  Sitting BP: 149/66 HR: 78  Standing BP: 151/68 HR: 75  Site: upper left arm  Mode: electronic

## 2021-10-04 NOTE — ECT TREATMENT NOTE - NSECTPOSTTXSUMMARY_PSY_ALL_CORE
The patient had a well modified grand mal seizure under general anesthesia and muscle relaxant. The patient is alert, responsive, in no acute distress.  Recovery uneventful. 

## 2021-10-05 PROCEDURE — 99232 SBSQ HOSP IP/OBS MODERATE 35: CPT

## 2021-10-05 RX ADMIN — NORTRIPTYLINE HYDROCHLORIDE 10 MILLIGRAM(S): 10 CAPSULE ORAL at 20:06

## 2021-10-05 RX ADMIN — FAMOTIDINE 20 MILLIGRAM(S): 10 INJECTION INTRAVENOUS at 20:06

## 2021-10-05 RX ADMIN — LIDOCAINE 2 PATCH: 4 CREAM TOPICAL at 15:17

## 2021-10-05 RX ADMIN — ESCITALOPRAM OXALATE 10 MILLIGRAM(S): 10 TABLET, FILM COATED ORAL at 09:10

## 2021-10-05 RX ADMIN — QUETIAPINE FUMARATE 125 MILLIGRAM(S): 200 TABLET, FILM COATED ORAL at 20:06

## 2021-10-05 RX ADMIN — Medication 1 TABLET(S): at 09:10

## 2021-10-05 RX ADMIN — Medication 75 MICROGRAM(S): at 06:13

## 2021-10-05 RX ADMIN — AMLODIPINE BESYLATE 10 MILLIGRAM(S): 2.5 TABLET ORAL at 09:10

## 2021-10-05 RX ADMIN — FAMOTIDINE 20 MILLIGRAM(S): 10 INJECTION INTRAVENOUS at 09:10

## 2021-10-05 NOTE — BH INPATIENT PSYCHIATRY PROGRESS NOTE - NSBHCHARTREVIEWVS_PSY_A_CORE FT
Vital Signs Last 24 Hrs  T(C): 36.9 (10-05-21 @ 07:28), Max: 37.1 (10-04-21 @ 16:41)  T(F): 98.5 (10-05-21 @ 07:28), Max: 98.7 (10-04-21 @ 16:41)  HR: 72 (10-04-21 @ 17:54) (66 - 87)  BP: 144/87 (10-04-21 @ 17:54) (144/87 - 173/76)  BP(mean): --  RR: 16 (10-05-21 @ 07:28) (16 - 20)  SpO2: 95% (10-04-21 @ 17:40) (95% - 98%)    Orthostatic VS  10-05-21 @ 07:28  Lying BP: --/-- HR: --  Sitting BP: 124/68 HR: 75  Standing BP: 120/63 HR: 89  Site: --  Mode: --  Orthostatic VS  10-04-21 @ 08:01  Lying BP: --/-- HR: --  Sitting BP: 144/63 HR: 78  Standing BP: 132/74 HR: 93  Site: --  Mode: --

## 2021-10-05 NOTE — BH INPATIENT PSYCHIATRY PROGRESS NOTE - NSBHFUPINTERVALHXFT_PSY_A_CORE
Patient seen for follow up for bipolar depression, chart reviewed, case discussed in tx team meeting with interdisciplinary staff and with Dr. Schmidt  Patient reports she is less depressed and reports she thinks ECT is helping her.  She reports she is ok with ECT tomorrow.  Teaching done re: need to maintain NPO from midnight  until after ECT tomorrow, with teach back and compliance verbalized.   Appears brighter.  Patient has been attending groups on unit and is less isolative.  Patient reports sleeping and eating ok.  Patient is Rx adherent and denies any SE and none are noted.  Denies any HI or SI.   Reports will tell staff if she has thoughts of harming self or others.  VS reviewed    Patient with no somatic complaints, prescribed lidocaine patch and Tylenol prn back  pain, will continue to monitor.  For ECT #7 tomorrow.

## 2021-10-05 NOTE — BH INPATIENT PSYCHIATRY PROGRESS NOTE - NSBHASSESSSUMMFT_PSY_ALL_CORE
Patient is a 80-year-old female, , mother, non-caregiver, domiciled alone, in private residence, retired dx of Bipolar I D/O, multiple psychiatric hospitalizations, most recently psychiatrically hospitalized at [facility]: 7/15/20-7/31/20 depression ( discharged medically and readmitted from 8/6/20-8/25/20 for depression, SI and anxiety where she rec’d 10 ECT treatments (no medication changes, was taking Lexapro and Seroquel), history of ECT with good response, currently in outpatient treatment with Dr. Donovan who prescribes Escitalopram 10mg, Amitriptyline 100mg, Nortriptyline 10mg and Seroquel 100mg, one suicide via cutting wrists about one year ago, no history of violence, no legal involvement, no NSSIB and no access to guns/weapons, presents [facility] with her daughter Yen requesting voluntary admission due to worsening sx’s of depression, SI and manic sx’s.  Clinical picture is significant for bipolar depression with worsening symptoms of depression and SI, multiple failed medication trials and recent SI with plan to cut wrist. Patient is seeking admission and is interested in ECT.     Remains acutely depressed,  is for ECT #7 on Wednesday    Plan:  c/o constipation, Miralax continued.  c/w Lexapro, Nortriptyline, Seroquel.  Started ECT on 9/22 with ECT #4 completed today on 9/29, next treatment on Friday 10/1.  c/o back pain which she has had prior to hospitalization, Lidocaine patch and Tylenol prn provided with benefit.  Group program, milieu therapy - limited involvement, encouraged patient to engage in treatment.  Routine checks, no indication for constant observation as able to contract for safety in a locked, supervised setting.

## 2021-10-06 PROCEDURE — 99232 SBSQ HOSP IP/OBS MODERATE 35: CPT

## 2021-10-06 RX ADMIN — NORTRIPTYLINE HYDROCHLORIDE 10 MILLIGRAM(S): 10 CAPSULE ORAL at 22:13

## 2021-10-06 RX ADMIN — LIDOCAINE 2 PATCH: 4 CREAM TOPICAL at 15:42

## 2021-10-06 RX ADMIN — FAMOTIDINE 20 MILLIGRAM(S): 10 INJECTION INTRAVENOUS at 22:12

## 2021-10-06 RX ADMIN — Medication 1 TABLET(S): at 08:48

## 2021-10-06 RX ADMIN — QUETIAPINE FUMARATE 125 MILLIGRAM(S): 200 TABLET, FILM COATED ORAL at 22:13

## 2021-10-06 RX ADMIN — Medication 75 MICROGRAM(S): at 06:42

## 2021-10-06 RX ADMIN — AMLODIPINE BESYLATE 10 MILLIGRAM(S): 2.5 TABLET ORAL at 08:48

## 2021-10-06 RX ADMIN — FAMOTIDINE 20 MILLIGRAM(S): 10 INJECTION INTRAVENOUS at 08:48

## 2021-10-06 RX ADMIN — ESCITALOPRAM OXALATE 10 MILLIGRAM(S): 10 TABLET, FILM COATED ORAL at 08:48

## 2021-10-06 NOTE — ECT PRE-PROCEDURE CHECKLIST - CAREGIVER PHONE NUMBER
[phone]
[phone]
[phone]
[phone]
[phone]
[phone]
[phone]
[phone]
[phone]

## 2021-10-06 NOTE — BH INPATIENT PSYCHIATRY PROGRESS NOTE - NSBHFUPINTERVALHXFT_PSY_A_CORE
Patient seen for follow up for bipolar depression, chart reviewed, case discussed in tx team meeting with interdisciplinary staff and with Dr. Schmidt  Patient reports she is less depressed and reports she thinks ECT is helping her.  She reports when she got to ECT suite, she just got confused, and she is not sure why.  Oriented to self, birth date, place and reason for being in hospital, did not know exact day or date, did know season.   Appears brighter.  Patient has been attending groups on unit and is less isolative.  Patient reports sleeping and eating ok.  Patient is Rx adherent and denies any SE and none are noted.  Denies any HI or SI.   Reports will tell staff if she has thoughts of harming self or others.  VS reviewed    Patient with no somatic complaints.  ECT was held today due to cognitive issues. Dr Steele called to discuss this, to monitor patient for ECT on Friday.  Spoke with Darío Bhatia, family member, who reports he and other family members report patient is more like herself, and he has concerns that she may be trying to manipulate the situation, now that she is less depressed.

## 2021-10-06 NOTE — BH INPATIENT PSYCHIATRY PROGRESS NOTE - NSBHCHARTREVIEWVS_PSY_A_CORE FT
Vital Signs Last 24 Hrs  T(C): 37.2 (10-06-21 @ 11:02), Max: 37.2 (10-06-21 @ 07:54)  T(F): 99 (10-06-21 @ 11:02), Max: 99 (10-06-21 @ 07:54)  HR: 96 (10-06-21 @ 11:02) (96 - 96)  BP: 144/82 (10-06-21 @ 11:02) (144/82 - 144/82)  BP(mean): --  RR: 17 (10-06-21 @ 11:02) (17 - 18)  SpO2: 97% (10-06-21 @ 11:02) (97% - 97%)    Orthostatic VS  10-06-21 @ 07:54  Lying BP: --/-- HR: --  Sitting BP: 135/65 HR: 79  Standing BP: 147/67 HR: 83  Site: --  Mode: --  Orthostatic VS  10-05-21 @ 07:28  Lying BP: --/-- HR: --  Sitting BP: 124/68 HR: 75  Standing BP: 120/63 HR: 89  Site: --  Mode: --

## 2021-10-06 NOTE — BH INPATIENT PSYCHIATRY PROGRESS NOTE - NSBHASSESSSUMMFT_PSY_ALL_CORE
Patient is a 80-year-old female, , mother, non-caregiver, domiciled alone, in private residence, retired dx of Bipolar I D/O, multiple psychiatric hospitalizations, most recently psychiatrically hospitalized at [facility]: 7/15/20-7/31/20 depression ( discharged medically and readmitted from 8/6/20-8/25/20 for depression, SI and anxiety where she rec’d 10 ECT treatments (no medication changes, was taking Lexapro and Seroquel), history of ECT with good response, currently in outpatient treatment with Dr. Donovan who prescribes Escitalopram 10mg, Amitriptyline 100mg, Nortriptyline 10mg and Seroquel 100mg, one suicide via cutting wrists about one year ago, no history of violence, no legal involvement, no NSSIB and no access to guns/weapons, presents [facility] with her daughter Yen requesting voluntary admission due to worsening sx’s of depression, SI and manic sx’s.  Clinical picture is significant for bipolar depression with worsening symptoms of depression and SI, multiple failed medication trials and recent SI with plan to cut wrist. Patient is seeking admission and is interested in ECT.     Remains acutely depressed,  is for ECT #7 was held due to cognitive issues reported by Dr. Steele ECT psychiatrist.  to reassess for further eCT    Plan:  c/o constipation, Miralax continued.  c/w Lexapro, Nortriptyline, Seroquel.  Started ECT on 9/22 with ECT #4 completed today on 9/29, next treatment on Friday 10/1.  c/o back pain which she has had prior to hospitalization, Lidocaine patch and Tylenol prn provided with benefit.  Group program, milieu therapy - limited involvement, encouraged patient to engage in treatment.  Routine checks, no indication for constant observation as able to contract for safety in a locked, supervised setting.

## 2021-10-06 NOTE — ECT PRE-PROCEDURE CHECKLIST - PATIENT REPRESENTATIVE: ( YOU CAN CHOOSE ANY PERSON THAT CAN ASSIST YOU WITH YOUR HEALTH CARE PREFERENCES, DOES NOT HAVE TO BE A SPOUSE, IMMEDIATE FAMILY OR SIGNIFICANT OTHER/PARTNER)
same name as above

## 2021-10-06 NOTE — ECT TREATMENT NOTE - NSICDXBHPRIMARYDX_PSY_ALL_CORE
Depressed bipolar I disorder   F31.9  

## 2021-10-06 NOTE — ECT TREATMENT NOTE - NSECTPTEVAL_PSY_ALL_CORE
Patient evaluated and History and Physical reviewed prior to ECT. There are no significant changes to the patient's condition unless specified.

## 2021-10-06 NOTE — ECT PRE-PROCEDURE CHECKLIST - NSPROEDALEARNPREF_GEN_A_NUR
audio/computer/internet/group instruction/individual instruction/pictorial/skill demonstration/verbal instruction/video/written material

## 2021-10-06 NOTE — ECT TREATMENT NOTE - NSECTREVSYS_PSY_ALL_CORE
Cardiovascular/Renal/Urologic/Abdominal/Metabolic

## 2021-10-06 NOTE — ECT PRE-PROCEDURE CHECKLIST - NS PRO AD NO ADVANCE DIRECTIVE
Discharged to home per ambulatory in stable condition with written and verbal instructions. Patient verbalizes understanding of information given.
No

## 2021-10-06 NOTE — ECT TREATMENT NOTE - NSECTIMPPLAN_PSY_ALL_CORE
Assessment today offers no contraindications to continue plan of treatment with ECT.
ECT Cancelled for today - evaluation reveals:
Assessment today offers no contraindications to continue plan of treatment with ECT.

## 2021-10-06 NOTE — ECT TREATMENT NOTE - NSECTIMPPLANNOCONTRACMTS_PSY_ALL_CORE
Patient has a decline in cognition from 10/10 to 5/10. Discussed with DESTINI Escalante. Will cancel ECT treatment for today.

## 2021-10-06 NOTE — ECT TREATMENT NOTE - NSECTROSNEGAT_PSY_ALL_CORE
Review of Systems negative/unchanged from previous exam except as noted below

## 2021-10-06 NOTE — ECT PRE-PROCEDURE CHECKLIST - NSPROPTRIGHTSUPPORTPHONE_GEN_A_NUR
[phone]
[phone]
[phone]
[phone]
[phone]
[phone]
[phone]
[phone]
[phone]

## 2021-10-06 NOTE — ECT PRE-PROCEDURE CHECKLIST - ALLERGIES
Allergies:-  No Known Allergies      

## 2021-10-06 NOTE — ECT PRE-PROCEDURE CHECKLIST - PRO INTERPRETER NEED 2
English
No pertinent past medical history
English

## 2021-10-06 NOTE — ECT PRE-PROCEDURE CHECKLIST - NSMEDSDOSETAKEN_PSY_ALL_CORE
Norvasc 10mg PO, Lexapro 10mg PO, Pepcid 20mg PO, lactobacillus 1 tab & one third of a cup of water mixed with Miralax at approx 0900
SEE EMAR
Norvasc 10mg PO, Lexapro 10mg PO, Pepcid 20mg PO, lactobacillus 1 tab & one third of a cup of water mixed with Miralax at approx 0900
SEE EMAR
Norvasc 10mg PO, Lexapro 10mg PO, Pepcid 20mg PO, lactobacillus 1 tab & one third of a cup of water mixed with Miralax at approx 0900

## 2021-10-06 NOTE — ECT PRE-PROCEDURE CHECKLIST - ALLERGY BAND ON
no known allergies

## 2021-10-06 NOTE — ECT PRE-PROCEDURE CHECKLIST - CAREGIVER NAME
Darío Bhatia

## 2021-10-06 NOTE — ECT PRE-PROCEDURE CHECKLIST - CAREGIVER ADDRESS
Not with pt

## 2021-10-06 NOTE — ECT TREATMENT NOTE - NSECTRENUROCOMMENT_PSY_ALL_CORE
h/o Rt Nephrectomy

## 2021-10-07 PROCEDURE — 99232 SBSQ HOSP IP/OBS MODERATE 35: CPT

## 2021-10-07 RX ORDER — SERTRALINE 25 MG/1
25 TABLET, FILM COATED ORAL DAILY
Refills: 0 | Status: DISCONTINUED | OUTPATIENT
Start: 2021-10-07 | End: 2021-10-11

## 2021-10-07 RX ADMIN — FAMOTIDINE 20 MILLIGRAM(S): 10 INJECTION INTRAVENOUS at 09:24

## 2021-10-07 RX ADMIN — LIDOCAINE 2 PATCH: 4 CREAM TOPICAL at 16:42

## 2021-10-07 RX ADMIN — FAMOTIDINE 20 MILLIGRAM(S): 10 INJECTION INTRAVENOUS at 20:52

## 2021-10-07 RX ADMIN — Medication 1 TABLET(S): at 09:24

## 2021-10-07 RX ADMIN — QUETIAPINE FUMARATE 125 MILLIGRAM(S): 200 TABLET, FILM COATED ORAL at 20:53

## 2021-10-07 RX ADMIN — AMLODIPINE BESYLATE 10 MILLIGRAM(S): 2.5 TABLET ORAL at 09:24

## 2021-10-07 RX ADMIN — ESCITALOPRAM OXALATE 10 MILLIGRAM(S): 10 TABLET, FILM COATED ORAL at 09:24

## 2021-10-07 NOTE — BH INPATIENT PSYCHIATRY PROGRESS NOTE - NSBHASSESSSUMMFT_PSY_ALL_CORE
Patient is a 80-year-old female, , mother, non-caregiver, domiciled alone, in private residence, retired dx of Bipolar I D/O, multiple psychiatric hospitalizations, most recently psychiatrically hospitalized at [facility]: 7/15/20-7/31/20 depression ( discharged medically and readmitted from 8/6/20-8/25/20 for depression, SI and anxiety where she rec’d 10 ECT treatments (no medication changes, was taking Lexapro and Seroquel), history of ECT with good response, currently in outpatient treatment with Dr. Donovan who prescribes Escitalopram 10mg, Amitriptyline 100mg, Nortriptyline 10mg and Seroquel 100mg, one suicide via cutting wrists about one year ago, no history of violence, no legal involvement, no NSSIB and no access to guns/weapons, presents [facility] with her daughter Yen requesting voluntary admission due to worsening sx’s of depression, SI and manic sx’s.  Clinical picture is significant for bipolar depression with worsening symptoms of depression and SI, multiple failed medication trials and recent SI with plan to cut wrist. Patient is seeking admission and is interested in ECT.     Remains acutely depressed,  is for ECT #7 was held due to cognitive issues reported by Dr. Steele ECT psychiatrist.  to reassess for further ECT.  Patient reports she does not want further ECT.      MOCA done 10/7=25    Plan:  c/o constipation, Miralax continued.  c/w Lexapro, Nortriptyline, Seroquel.  Started ECT on 9/22 with ECT #4 completed today on 9/29, next treatment on Friday 10/1.  DC Lexapro and Pamelor, begin Zoloft  c/o back pain which she has had prior to hospitalization, Lidocaine patch and Tylenol prn provided with benefit.  Group program, milieu therapy - limited involvement, encouraged patient to engage in treatment.  Routine checks, no indication for constant observation as able to contract for safety in a locked, supervised setting.

## 2021-10-07 NOTE — BH INPATIENT PSYCHIATRY PROGRESS NOTE - NSBHFUPINTERVALHXFT_PSY_A_CORE
Patient seen for follow up for bipolar depression, chart reviewed, case discussed in tx team meeting with interdisciplinary staff and with Dr. Schmidt  Patient reports she is less depressed and reports she thinks ECT helped her but she does not think she wants it anymore.   She reports she thinks it makes her confused.  MOCA done, patient scored 25. ( indicative of mild cognitive impairment)     Appears brighter.  Patient has been attending groups on unit at times, but is isolating to room more. Patient reports sleeping and eating ok.  Patient is Rx adherent and denies any SE and none are noted.  Denies any HI or SI.   Reports will tell staff if she has thoughts of harming self or others.  VS reviewed    Patient with no somatic complaints.  DC Lexapro and Pamelor, begin Zoloft.  Patient teaching done re: DC of Lexapro and Pamelor and beginning Zoloft with patient agreeing to Rx change.

## 2021-10-07 NOTE — BH INPATIENT PSYCHIATRY PROGRESS NOTE - NSBHCHARTREVIEWVS_PSY_A_CORE FT
Vital Signs Last 24 Hrs  T(C): 36.9 (10-07-21 @ 08:10), Max: 36.9 (10-07-21 @ 08:10)  T(F): 98.4 (10-07-21 @ 08:10), Max: 98.4 (10-07-21 @ 08:10)  HR: --  BP: --  BP(mean): --  RR: 16 (10-07-21 @ 08:10) (16 - 16)  SpO2: --    Orthostatic VS  10-07-21 @ 08:10  Lying BP: --/-- HR: --  Sitting BP: 147/65 HR: 92  Standing BP: 143/74 HR: 101  Site: --  Mode: --  Orthostatic VS  10-06-21 @ 07:54  Lying BP: --/-- HR: --  Sitting BP: 135/65 HR: 79  Standing BP: 147/67 HR: 83  Site: --  Mode: --

## 2021-10-08 PROCEDURE — 99232 SBSQ HOSP IP/OBS MODERATE 35: CPT

## 2021-10-08 RX ADMIN — AMLODIPINE BESYLATE 10 MILLIGRAM(S): 2.5 TABLET ORAL at 09:16

## 2021-10-08 RX ADMIN — SERTRALINE 25 MILLIGRAM(S): 25 TABLET, FILM COATED ORAL at 09:17

## 2021-10-08 RX ADMIN — QUETIAPINE FUMARATE 125 MILLIGRAM(S): 200 TABLET, FILM COATED ORAL at 20:46

## 2021-10-08 RX ADMIN — Medication 1 TABLET(S): at 09:16

## 2021-10-08 RX ADMIN — FAMOTIDINE 20 MILLIGRAM(S): 10 INJECTION INTRAVENOUS at 20:46

## 2021-10-08 RX ADMIN — Medication 75 MICROGRAM(S): at 06:28

## 2021-10-08 RX ADMIN — FAMOTIDINE 20 MILLIGRAM(S): 10 INJECTION INTRAVENOUS at 09:16

## 2021-10-08 NOTE — BH INPATIENT PSYCHIATRY PROGRESS NOTE - NSBHASSESSSUMMFT_PSY_ALL_CORE
Patient is a 80-year-old female, , mother, non-caregiver, domiciled alone, in private residence, retired dx of Bipolar I D/O, multiple psychiatric hospitalizations, most recently psychiatrically hospitalized at [facility]: 7/15/20-7/31/20 depression ( discharged medically and readmitted from 8/6/20-8/25/20 for depression, SI and anxiety where she rec’d 10 ECT treatments (no medication changes, was taking Lexapro and Seroquel), history of ECT with good response, currently in outpatient treatment with Dr. Donovan who prescribes Escitalopram 10mg, Amitriptyline 100mg, Nortriptyline 10mg and Seroquel 100mg, one suicide via cutting wrists about one year ago, no history of violence, no legal involvement, no NSSIB and no access to guns/weapons, presents [facility] with her daughter Yen requesting voluntary admission due to worsening sx’s of depression, SI and manic sx’s.  Clinical picture is significant for bipolar depression with worsening symptoms of depression and SI, multiple failed medication trials and recent SI with plan to cut wrist. Patient is seeking admission and is interested in ECT.     Remains acutely depressed,  is for ECT #7 was held due to cognitive issues reported by Dr. Steele ECT psychiatrist.  to reassess for further ECT.  Patient reports she does not want further ECT.      MOCA done 10/7=25    Plan:  c/o constipation, Miralax continued.  c/w Lexapro, Nortriptyline, Seroquel.  Started ECT on 9/22 with ECT #4 completed today on 9/29, next treatment on Friday 10/1.  DC Lexapro and Pamelor, begin Zoloft  c/o back pain which she has had prior to hospitalization, Lidocaine patch and Tylenol prn provided with benefit.  Group program, milieu therapy - limited involvement, encouraged patient to engage in treatment.  Routine checks, no indication for constant observation as able to contract for safety in a locked, supervised setting.

## 2021-10-08 NOTE — BH INPATIENT PSYCHIATRY PROGRESS NOTE - NSBHCHARTREVIEWVS_PSY_A_CORE FT
Vital Signs Last 24 Hrs  T(C): 36.8 (10-08-21 @ 07:42), Max: 36.8 (10-08-21 @ 07:42)  T(F): 98.3 (10-08-21 @ 07:42), Max: 98.3 (10-08-21 @ 07:42)  HR: --  BP: --  BP(mean): --  RR: 17 (10-08-21 @ 07:42) (17 - 17)  SpO2: --    Orthostatic VS  10-08-21 @ 07:42  Lying BP: --/-- HR: --  Sitting BP: 148/60 HR: 79  Standing BP: 142/67 HR: 82  Site: --  Mode: --  Orthostatic VS  10-07-21 @ 08:10  Lying BP: --/-- HR: --  Sitting BP: 147/65 HR: 92  Standing BP: 143/74 HR: 101  Site: --  Mode: --

## 2021-10-08 NOTE — BH INPATIENT PSYCHIATRY PROGRESS NOTE - NSBHFUPINTERVALHXFT_PSY_A_CORE
Patient seen for follow up for bipolar depression, chart reviewed, case discussed in tx team meeting with interdisciplinary staff and with Dr. Schmidt  Patient reports she is less depressed and reports she thinks ECT helped her but she does not think she wants it anymore.   She reports she thinks it makes her confused.  MOCA done, on 10/7 patient scored 25. ( indicative of mild cognitive impairment)     Appears brighter.  Patient has been attending groups on unit at times, but is isolating to room at times.  Patient reports sleeping and eating ok.  Patient is Rx adherent and denies any SE and none are noted.  Denies any HI or SI.   Reports will tell staff if she has thoughts of harming self or others.  VS reviewed    Patient with no somatic complaints. spoke with patient's son re: patient now not wanting ECT and Rx changes and he discussed his concerns about patient.  Support provided

## 2021-10-09 RX ADMIN — Medication 75 MICROGRAM(S): at 06:51

## 2021-10-09 RX ADMIN — FAMOTIDINE 20 MILLIGRAM(S): 10 INJECTION INTRAVENOUS at 20:55

## 2021-10-09 RX ADMIN — FAMOTIDINE 20 MILLIGRAM(S): 10 INJECTION INTRAVENOUS at 08:33

## 2021-10-09 RX ADMIN — Medication 1 TABLET(S): at 08:33

## 2021-10-09 RX ADMIN — AMLODIPINE BESYLATE 10 MILLIGRAM(S): 2.5 TABLET ORAL at 08:33

## 2021-10-09 RX ADMIN — QUETIAPINE FUMARATE 125 MILLIGRAM(S): 200 TABLET, FILM COATED ORAL at 20:55

## 2021-10-09 RX ADMIN — SERTRALINE 25 MILLIGRAM(S): 25 TABLET, FILM COATED ORAL at 08:33

## 2021-10-10 RX ADMIN — LIDOCAINE 2 PATCH: 4 CREAM TOPICAL at 15:11

## 2021-10-10 RX ADMIN — Medication 75 MICROGRAM(S): at 06:18

## 2021-10-10 RX ADMIN — SERTRALINE 25 MILLIGRAM(S): 25 TABLET, FILM COATED ORAL at 10:10

## 2021-10-10 RX ADMIN — Medication 1 TABLET(S): at 10:10

## 2021-10-10 RX ADMIN — FAMOTIDINE 20 MILLIGRAM(S): 10 INJECTION INTRAVENOUS at 10:10

## 2021-10-10 RX ADMIN — FAMOTIDINE 20 MILLIGRAM(S): 10 INJECTION INTRAVENOUS at 20:20

## 2021-10-10 RX ADMIN — QUETIAPINE FUMARATE 125 MILLIGRAM(S): 200 TABLET, FILM COATED ORAL at 20:19

## 2021-10-10 RX ADMIN — AMLODIPINE BESYLATE 10 MILLIGRAM(S): 2.5 TABLET ORAL at 10:10

## 2021-10-11 PROCEDURE — 99232 SBSQ HOSP IP/OBS MODERATE 35: CPT

## 2021-10-11 RX ORDER — SERTRALINE 25 MG/1
50 TABLET, FILM COATED ORAL DAILY
Refills: 0 | Status: DISCONTINUED | OUTPATIENT
Start: 2021-10-11 | End: 2021-10-18

## 2021-10-11 RX ADMIN — AMLODIPINE BESYLATE 10 MILLIGRAM(S): 2.5 TABLET ORAL at 08:48

## 2021-10-11 RX ADMIN — FAMOTIDINE 20 MILLIGRAM(S): 10 INJECTION INTRAVENOUS at 08:48

## 2021-10-11 RX ADMIN — QUETIAPINE FUMARATE 125 MILLIGRAM(S): 200 TABLET, FILM COATED ORAL at 20:41

## 2021-10-11 RX ADMIN — SERTRALINE 25 MILLIGRAM(S): 25 TABLET, FILM COATED ORAL at 08:49

## 2021-10-11 RX ADMIN — Medication 1 TABLET(S): at 08:48

## 2021-10-11 RX ADMIN — Medication 75 MICROGRAM(S): at 06:33

## 2021-10-11 RX ADMIN — FAMOTIDINE 20 MILLIGRAM(S): 10 INJECTION INTRAVENOUS at 20:41

## 2021-10-11 NOTE — BH INPATIENT PSYCHIATRY PROGRESS NOTE - NSBHCHARTREVIEWVS_PSY_A_CORE FT
Vital Signs Last 24 Hrs  T(C): 36.9 (10-11-21 @ 07:28), Max: 36.9 (10-11-21 @ 07:28)  T(F): 98.4 (10-11-21 @ 07:28), Max: 98.4 (10-11-21 @ 07:28)  HR: --  BP: --  BP(mean): --  RR: 20 (10-11-21 @ 07:28) (20 - 20)  SpO2: --    Orthostatic VS  10-11-21 @ 07:28  Lying BP: --/-- HR: --  Sitting BP: 156/67 HR: 84  Standing BP: 147/66 HR: 90  Site: --  Mode: --  Orthostatic VS  10-10-21 @ 08:24  Lying BP: --/-- HR: --  Sitting BP: 152/72 HR: 84  Standing BP: 141/70 HR: 93  Site: --  Mode: --

## 2021-10-11 NOTE — BH INPATIENT PSYCHIATRY PROGRESS NOTE - NSBHASSESSSUMMFT_PSY_ALL_CORE
Patient is a 80-year-old female, , mother, non-caregiver, domiciled alone, in private residence, retired dx of Bipolar I D/O, multiple psychiatric hospitalizations, most recently psychiatrically hospitalized at [facility]: 7/15/20-7/31/20 depression ( discharged medically and readmitted from 8/6/20-8/25/20 for depression, SI and anxiety where she rec’d 10 ECT treatments (no medication changes, was taking Lexapro and Seroquel), history of ECT with good response, currently in outpatient treatment with Dr. Donovan who prescribes Escitalopram 10mg, Amitriptyline 100mg, Nortriptyline 10mg and Seroquel 100mg, one suicide via cutting wrists about one year ago, no history of violence, no legal involvement, no NSSIB and no access to guns/weapons, presents [facility] with her daughter Yen requesting voluntary admission due to worsening sx’s of depression, SI and manic sx’s.  Clinical picture is significant for bipolar depression with worsening symptoms of depression and SI, multiple failed medication trials and recent SI with plan to cut wrist. Patient is seeking admission and is interested in ECT.     Remains acutely depressed,  is for ECT #7 was held due to cognitive issues reported by Dr. Steele ECT psychiatrist.  to reassess for further ECT.  Patient reports she does not want further ECT.      MOCA done 10/7=25    Plan:  c/o constipation, Miralax continued.   Lexapro, Nortriptyline, Seroquel. DC Lexapro and Nortriptyline.  Started ECT on 9/22 with ECT #4 completed today on 9/29, next treatment on Friday 10/1.   Lexapro and Pamelor, begin Zoloft and increase to 50mg  continue Seroquel  c/o back pain which she has had prior to hospitalization, Lidocaine patch and Tylenol prn provided with benefit.  Group program, milieu therapy - improved involvement, encouraged patient to engage in treatment.  Routine checks, no indication for constant observation as able to contract for safety in a locked, supervised setting.

## 2021-10-12 PROCEDURE — 99232 SBSQ HOSP IP/OBS MODERATE 35: CPT | Mod: 25

## 2021-10-12 RX ADMIN — FAMOTIDINE 20 MILLIGRAM(S): 10 INJECTION INTRAVENOUS at 20:41

## 2021-10-12 RX ADMIN — AMLODIPINE BESYLATE 10 MILLIGRAM(S): 2.5 TABLET ORAL at 08:51

## 2021-10-12 RX ADMIN — Medication 75 MICROGRAM(S): at 06:16

## 2021-10-12 RX ADMIN — QUETIAPINE FUMARATE 125 MILLIGRAM(S): 200 TABLET, FILM COATED ORAL at 20:41

## 2021-10-12 RX ADMIN — SERTRALINE 50 MILLIGRAM(S): 25 TABLET, FILM COATED ORAL at 08:51

## 2021-10-12 RX ADMIN — LIDOCAINE 2 PATCH: 4 CREAM TOPICAL at 14:58

## 2021-10-12 RX ADMIN — FAMOTIDINE 20 MILLIGRAM(S): 10 INJECTION INTRAVENOUS at 08:51

## 2021-10-12 RX ADMIN — Medication 1 TABLET(S): at 08:51

## 2021-10-12 NOTE — BH TREATMENT PLAN - NSTXSUICIDGOAL_PSY_ALL_CORE
Be able to state 3 reasons for living

## 2021-10-12 NOTE — BH TREATMENT PLAN - NSTXSUICIDINTERMD_PSY_ALL_CORE
ECT and medication management
ECT and medication management   denies current SI
medication management   denies current SI
ECT and medication management   denies current SI
ECT and medication management   denies current SI

## 2021-10-12 NOTE — BH TREATMENT PLAN - NSTXECTGOAL_PSY_ALL_CORE
Maintain NPO status as indicated prior to procedure

## 2021-10-12 NOTE — BH TREATMENT PLAN - NSTXCOPEINTERPR_PSY_ALL_CORE
Over the next 7 days, Psychiatric Rehabilitation staff will utilize group and individual psychotherapy, and psychoeducation to assist the patient in reaching her treatment goal of identifying and utilizing 2 coping skills that meet pt. immediate needs.
During this hospitalization, patient is encouraged to attend daily groups and meet with staff individually in order to identify 2 effective coping skills to manage symptoms of depression within seven days.
Over the next 7 days, Psychiatric Rehabilitation staff will utilize group and individual psychotherapy, and psychoeducation to assist the patient in reaching her treatment goal of identifying and utilizing 2 coping skills that meet pt. immediate needs.
none
Patient has demonstrated minimal progress towards psychiatric rehabilitation goal of identifying and utilizing coping skills to better manage symptoms. Despite prompting from writer, patient was unable to identify any coping skills. Psychiatric rehabilitation recommends patient continue to attend groups, engage in individual sessions, and participate in activities in the milieu to continue exploring coping skills to manage symptoms.

## 2021-10-12 NOTE — BH TREATMENT PLAN - NSCMSPTSTRENGTHS_PSY_ALL_CORE
Able to adapt/Assertive/Self confidence/Self-reliant/Sense of Humor/Supportive family
Compliance to treatment/Tolerant
Compliance to treatment/Supportive family/Tolerant
Compliance to treatment/Supportive family/Tolerant
Able to adapt/Assertive/Self confidence/Self-reliant/Sense of Humor/Supportive family

## 2021-10-12 NOTE — BH TREATMENT PLAN - NSTXDEPRESINTERSW_PSY_ALL_CORE
SW met with pt to offer support. Also spoke with pt's son who is very supportive.  Pt was encouraged to spend more time in the community but she does not feel motivated for groups.   SW discussed pt's case with treatment team during daily morning rounds.
SW met with pt to offer support. Also spoke with pt's son who is very supportive.  Pt was encouraged to spend more time in the community but she does not feel motivated for groups.   SW discussed pt's case with treatment team during daily morning rounds.

## 2021-10-12 NOTE — BH TREATMENT PLAN - NSTXPROBECT_PSY_ALL_CORE
ECT, PATIENT RECEIVING

## 2021-10-12 NOTE — BH TREATMENT PLAN - NSPTSTATEDGOAL_PSY_ALL_CORE
To receive ECT
to feel less depressed
to feel less depressed
Patient seeks psychiatric stabilization to return to the community with appropriate aftercare and supports.

## 2021-10-12 NOTE — BH INPATIENT PSYCHIATRY PROGRESS NOTE - NSBHASSESSSUMMFT_PSY_ALL_CORE
Patient is a 80-year-old female, , mother, non-caregiver, domiciled alone, in private residence, retired dx of Bipolar I D/O, multiple psychiatric hospitalizations, most recently psychiatrically hospitalized at [facility]: 7/15/20-7/31/20 depression ( discharged medically and readmitted from 8/6/20-8/25/20 for depression, SI and anxiety where she rec’d 10 ECT treatments (no medication changes, was taking Lexapro and Seroquel), history of ECT with good response, currently in outpatient treatment with Dr. Donovan who prescribes Escitalopram 10mg, Amitriptyline 100mg, Nortriptyline 10mg and Seroquel 100mg, one suicide via cutting wrists about one year ago, no history of violence, no legal involvement, no NSSIB and no access to guns/weapons, presents [facility] with her daughter Yen requesting voluntary admission due to worsening sx’s of depression, SI and manic sx’s.  Clinical picture is significant for bipolar depression with worsening symptoms of depression and SI, multiple failed medication trials and recent SI with plan to cut wrist. Patient is seeking admission and is interested in ECT.     Remains acutely depressed,  is for ECT #7 was held due to cognitive issues reported by Dr. Steele ECT psychiatrist.  to reassess for further ECT.  Patient reports she does not want further ECT.      MOCA done 10/7=25    Plan:  c/o constipation, Miralax continued.   Lexapro, Nortriptyline, Seroquel. DC Lexapro and Nortriptyline.  Started ECT on 9/22 with ECT #4 completed today on 9/29, next treatment on Friday 10/1.   Lexapro and Pamelor, begin Zoloft and increase to 50mg  continue Seroquel  c/o back pain which she has had prior to hospitalization, Lidocaine patch and Tylenol prn provided with benefit.  Group program, milieu therapy - improved involvement, encouraged patient to engage in treatment.  Routine checks, no indication for constant observation as able to contract for safety in a locked, supervised setting.

## 2021-10-12 NOTE — BH TREATMENT PLAN - NSBHPRIMARYDX_PSY_ALL_CORE
Depressed bipolar I disorder    

## 2021-10-12 NOTE — BH INPATIENT PSYCHIATRY PROGRESS NOTE - NSBHCHARTREVIEWVS_PSY_A_CORE FT
Vital Signs Last 24 Hrs  T(C): 37.2 (10-12-21 @ 07:21), Max: 37.2 (10-12-21 @ 07:21)  T(F): 98.9 (10-12-21 @ 07:21), Max: 98.9 (10-12-21 @ 07:21)  HR: --  BP: --  BP(mean): --  RR: 18 (10-12-21 @ 07:21) (18 - 18)  SpO2: --    Orthostatic VS  10-12-21 @ 07:21  Lying BP: --/-- HR: --  Sitting BP: 150/64 HR: 78  Standing BP: 159/66 HR: 85  Site: --  Mode: --  Orthostatic VS  10-11-21 @ 07:28  Lying BP: --/-- HR: --  Sitting BP: 156/67 HR: 84  Standing BP: 147/66 HR: 90  Site: --  Mode: --

## 2021-10-12 NOTE — BH TREATMENT PLAN - NSTXCOPEINTERMD_PSY_ALL_CORE
Encourage groups    Rx management
Encourage groups    Rx management  
Encourage groups
Encourage groups    Rx management
Encourage groups    Rx management   ECT

## 2021-10-12 NOTE — BH INPATIENT PSYCHIATRY PROGRESS NOTE - NSBHFUPINTERVALHXFT_PSY_A_CORE
Patient seen for follow up for bipolar depression, chart reviewed, case discussed in tx team meeting with interdisciplinary staff and with Dr. Schmidt  Patient continues to report she is less depressed and reports she thinks ECT helped her but she does not want ECT anymore.   She reports she thinks it makes her confused.  MOCA done, on 10/7 patient scored 25. ( indicative of mild cognitive impairment)     Appears brighter.  Patient has been attending groups on unit and is spending more time in the day area talking to select peers.  Patient reports sleeping and eating ok.  Patient is Rx adherent and denies any SE and none are noted.  Denies any HI or SI.   Reports will tell staff if she has thoughts of harming self or others.  VS reviewed    Patient with no somatic complaints except dental bridge fell out over the weekend, Patient denies any dental pain.  Dental consult done today, and bridge recemented.  spoke with patient's son today  re: patient now not wanting ECT and Rx changes and he discussed his concerns about patient. He reports he feels she needs to continue ECT, discussed with son that patient is refusing ECT.    Support provided to son. Patient noted to have elevated BP.  Dr. Wilcox, hospitalist contacted about this.  Spoke with Dr Rucker outpatient psychiatrist and discused patient's ECT issues.

## 2021-10-12 NOTE — BH TREATMENT PLAN - NSTXDCOTHRINTERSW_PSY_ALL_CORE
SW encouraged pt to continue ECT treatment and offered reassurance while also validating her ongoing depression.
SW encouraged pt to attend rehab groups.
SW will provide patient and her spouse/family with support, psycho-education, and discharge planning; while coordinating care with interdisciplinary treatment team.
SW met with pt who was anxious about ECT. SW validated her feelings and offered support. SW also spoke wiht pt['s s on [place] to keep him updated on pt's progress.

## 2021-10-12 NOTE — BH TREATMENT PLAN - NSTXDEPRESINTERMD_PSY_ALL_CORE
ECT and medication management
 medication management   DC Lexapro and Pamelor   begin Zoloft

## 2021-10-12 NOTE — BH TREATMENT PLAN - NSDCCRITERIA_PSY_ALL_CORE
symptom management, safety planning, care coordination

## 2021-10-12 NOTE — BH TREATMENT PLAN - NSTXECTINTERMD_PSY_ALL_CORE
Taper off Tegretol  completed.    Patient teaching done re: need to maintain NPO from midnight before ECT until after ECT the next day with teach back and compliance verbalized
Taper off Tegretol  completed.    Patient teaching done re: need to maintain NPO from midnight before ECT until after ECT the next day with teach back and compliance verbalized    patient declines ECT
Taper off Tegretol 
Taper off Tegretol  completed.    Patient teaching done re: need to maintain NPO from midnight before ECT until after ECT the next day with teach back and compliance verbalized
Taper off Tegretol  completed.    Patient teaching done re: need to maintain NPO from midnight before ECT until after ECT the next day with teach back and compliance verbalized

## 2021-10-12 NOTE — BH TREATMENT PLAN - NSTXDEPRESGOAL_PSY_ALL_CORE
Attend and participate in at least 2 groups daily despite low mood/energy
Exhibit improvements in self-grooming, hygiene, sleep and appetite
Exhibit improvements in self-grooming, hygiene, sleep and appetite
Attend and participate in at least 2 groups daily despite low mood/energy
Attend and participate in at least 2 groups daily despite low mood/energy

## 2021-10-12 NOTE — BH TREATMENT PLAN - NSTXDCOTHRGOAL_PSY_ALL_CORE
Patient will report improved mood x 3 consecutive days.
Patient will report improved mood and insight into coping skills for symptoms, and with no SIIP.
Pt will report improved depression x 3 days.
Patient will report improved mood and insight into coping skills for symptoms, and with no SIIP.

## 2021-10-12 NOTE — BH TREATMENT PLAN - NSTXPLANTHERAPYSESSIONSFT_PSY_ALL_CORE
09-30-21  Type of therapy: Cognitive behavior therapy,Creative arts therapy,Leisure development,Peer advocate,Symptom management  Type of session: Individual  Level of patient participation: Engaged  Duration of participation: Less than 15 minutes  Therapy conducted by: Psych rehab  Therapy Summary: Writer met with patient for an individual session in order to review progress towards psychiatric rehabilitation goals. Patient was pleasant, polite and superficially engaged in the session. Patient has demonstrated minimal improvement in symptoms. Patient reported no change in mood and stated she still feels depressed. Patient has been adherent to medication and began receiving ECT treatments. At this time, she has not noticed the impact of medication or ECT. Patient endorsed fair sleep and appetite. Patient endorsed passive SI and stated that if her thoughts worsened, she would be able to seek staff support. Patient was minimally receptive to writer’s efforts to explore coping skills. Due to the COVID-19 pandemic, unit structure and activities are re-evaluated on a consistent basis in effort to maintain the safety of patients and staff. Patient attends some psychiatric rehabilitation groups and participates appropriately. Patient is mostly isolative to her room and demonstrates fair behavioral control. Patient is social with select peers and able to make needs known to staff.  
  09-23-21  Type of therapy: Dialectical behavior therapy,Coping skills,Creative arts therapy,Leisure development,Music therapy,Peer advocate  Type of session: Individual  Level of patient participation: Attentive,Engaged,Participates  Duration of participation: 15 minutes  Therapy conducted by: Psych rehab  Therapy Summary: Writer met with patient in order to review progress toward rehabilitation goals and assess current functioning. Patient was appproached for session while sitting in the dining room. Patient was verbal and cooperative. Patient was able to identify coping skills to manage symptoms such as talking to someone, and engaging in physical activity. Patient reported minimal improvement in functioning over the past week. Patient reported that she had one treatment of ECT, and is hopeful that this will help her depressive symtpoms since it had in the past.   Patient is compliant with medication, visible in the community, and attends most of the groups offered on the unit. Patient has not been a behavioral managment issue on the unit. Patient reported that her energy level is low, and her appetite is poor, and she "pushes" herself to eat and get out of bed, Writer provided patient with support and encouragment.   Currently, patient denies AH/VH/SI/HI.  
  10-07-21  --  Type of session: Individual  Level of patient participation: Participates  Duration of participation: Less than 15 minutes  Therapy conducted by: Psych rehab  Therapy Summary: Writer met with patient in order to review progres toward rehabilitation goals and assess current functioning. Patient was lying in bed when approached by writer. Patient was superficially cooperative and minimally verbal. Patient reported that she continues to feel depressed though she did report some improvement over the past week. Patient stated that she continues to receive ECT treatment and has been compliant with her medication regimen. Patient has not attended any groups this week, and patient reported that this was due to feeling "tired with low energy". Patient stated that she often feels this way after her ECT treatment.   Patient is minimally social with peers and spends much of her time in her room, in bed. Patient did exhibit some improvment in ADLs as she showered and changed her clothing today.   Patient reported that she is sleeping well and her appetite is "so-so". Patient was able to identify "spendiing time with her family" as a coping skill to manage sympttoms, though she required prompting and suggestions from writer. Psychiatric rehabilitation staff will continue to encourage patient to attend daily groups and explore additional coping skills.

## 2021-10-13 PROCEDURE — 99232 SBSQ HOSP IP/OBS MODERATE 35: CPT

## 2021-10-13 RX ORDER — LISINOPRIL 2.5 MG/1
5 TABLET ORAL DAILY
Refills: 0 | Status: DISCONTINUED | OUTPATIENT
Start: 2021-10-13 | End: 2021-10-18

## 2021-10-13 RX ADMIN — FAMOTIDINE 20 MILLIGRAM(S): 10 INJECTION INTRAVENOUS at 09:30

## 2021-10-13 RX ADMIN — Medication 75 MICROGRAM(S): at 06:27

## 2021-10-13 RX ADMIN — AMLODIPINE BESYLATE 10 MILLIGRAM(S): 2.5 TABLET ORAL at 09:30

## 2021-10-13 RX ADMIN — QUETIAPINE FUMARATE 125 MILLIGRAM(S): 200 TABLET, FILM COATED ORAL at 21:14

## 2021-10-13 RX ADMIN — Medication 1 TABLET(S): at 09:30

## 2021-10-13 RX ADMIN — LIDOCAINE 2 PATCH: 4 CREAM TOPICAL at 14:34

## 2021-10-13 RX ADMIN — SERTRALINE 50 MILLIGRAM(S): 25 TABLET, FILM COATED ORAL at 09:31

## 2021-10-13 RX ADMIN — FAMOTIDINE 20 MILLIGRAM(S): 10 INJECTION INTRAVENOUS at 21:14

## 2021-10-13 NOTE — BH INPATIENT PSYCHIATRY PROGRESS NOTE - NSBHFUPINTERVALHXFT_PSY_A_CORE
Patient seen for follow up for bipolar depression, chart reviewed, case discussed in tx team meeting with interdisciplinary staff and with Dr. Schmidt  Patient continues to report she is less depressed and reports she thinks ECT helped her but she does not want ECT anymore.   She reports she thinks it makes her confused.  MOCA done, on 10/7 patient scored 25. ( indicative of mild cognitive impairment)     Appears brighter.  Patient has been attending groups on unit and is spending more time in the day area talking to select peers.  Patient reports sleeping and eating ok.  Patient is Rx adherent and denies any SE and none are noted.  Denies any HI or SI.   Reports will tell staff if she has thoughts of harming self or others.  VS reviewed   Patient noted to have pattern of hypertension, Dr. Wilcox aware and ordered lisinopril. Discussed case with Dr. Wilcox, and BMP ordered for 10/15  Discussed with patient that Dr. Rucker recommends she have outpatient maintenance ECT, but patient continues to decline

## 2021-10-13 NOTE — CHART NOTE - NSCHARTNOTEFT_GEN_A_CORE
Medicine called to adjust BP meds as patient's BP are uncontrolled on amlodipine 10 mg qd. Will add lisinopril 5 mg qd, please obtain BMP 10/15 to assess Scr and electrolytes after initiating therapy. Will cont to follow and make adjustments as needed.    Carolyn Wilcox MD  Pager # [phone]

## 2021-10-13 NOTE — BH INPATIENT PSYCHIATRY PROGRESS NOTE - NSBHCHARTREVIEWVS_PSY_A_CORE FT
Vital Signs Last 24 Hrs  T(C): 37.1 (10-13-21 @ 07:58), Max: 37.1 (10-13-21 @ 07:58)  T(F): 98.8 (10-13-21 @ 07:58), Max: 98.8 (10-13-21 @ 07:58)  HR: --  BP: --  BP(mean): --  RR: 18 (10-13-21 @ 07:58) (18 - 18)  SpO2: --    Orthostatic VS  10-13-21 @ 07:58  Lying BP: --/-- HR: --  Sitting BP: 138/85 HR: 75  Standing BP: 137/77 HR: 86  Site: --  Mode: --  Orthostatic VS  10-12-21 @ 07:21  Lying BP: --/-- HR: --  Sitting BP: 150/64 HR: 78  Standing BP: 159/66 HR: 85  Site: --  Mode: --

## 2021-10-13 NOTE — BH INPATIENT PSYCHIATRY PROGRESS NOTE - NSBHASSESSSUMMFT_PSY_ALL_CORE
Patient is a 80-year-old female, , mother, non-caregiver, domiciled alone, in private residence, retired dx of Bipolar I D/O, multiple psychiatric hospitalizations, most recently psychiatrically hospitalized at [facility]: 7/15/20-7/31/20 depression ( discharged medically and readmitted from 8/6/20-8/25/20 for depression, SI and anxiety where she rec’d 10 ECT treatments (no medication changes, was taking Lexapro and Seroquel), history of ECT with good response, currently in outpatient treatment with Dr. Donovan who prescribes Escitalopram 10mg, Amitriptyline 100mg, Nortriptyline 10mg and Seroquel 100mg, one suicide via cutting wrists about one year ago, no history of violence, no legal involvement, no NSSIB and no access to guns/weapons, presents [facility] with her daughter Yen requesting voluntary admission due to worsening sx’s of depression, SI and manic sx’s.  Clinical picture is significant for bipolar depression with worsening symptoms of depression and SI, multiple failed medication trials and recent SI with plan to cut wrist. Patient is seeking admission and is interested in ECT.     Remains acutely depressed,  is for ECT #7 was held due to cognitive issues reported by Dr. Steele ECT psychiatrist.  to reassess for further ECT.  Patient reports she does not want further ECT.      MOCA done 10/7=25    Plan:  c/o constipation, Miralax continued.   Lexapro, Nortriptyline, Seroquel. DC Lexapro and Nortriptyline.  Started ECT on 9/22  ended at ECT #6 due to cognitive issues    Lexapro and Pamelor, begin Zoloft and increase to 50mg  continue Seroquel  c/o back pain which she has had prior to hospitalization, Lidocaine patch and Tylenol prn provided with benefit.  Group program, milieu therapy - improved involvement, encouraged patient to engage in treatment.  Routine checks, no indication for constant observation as able to contract for safety in a locked, supervised setting.  Hospitalist added Lisinopril due to HTN

## 2021-10-14 PROCEDURE — 99232 SBSQ HOSP IP/OBS MODERATE 35: CPT

## 2021-10-14 RX ADMIN — SENNA PLUS 2 TABLET(S): 8.6 TABLET ORAL at 21:28

## 2021-10-14 RX ADMIN — FAMOTIDINE 20 MILLIGRAM(S): 10 INJECTION INTRAVENOUS at 09:13

## 2021-10-14 RX ADMIN — Medication 75 MICROGRAM(S): at 06:26

## 2021-10-14 RX ADMIN — POLYETHYLENE GLYCOL 3350 17 GRAM(S): 17 POWDER, FOR SOLUTION ORAL at 16:24

## 2021-10-14 RX ADMIN — Medication 1 TABLET(S): at 09:14

## 2021-10-14 RX ADMIN — SERTRALINE 50 MILLIGRAM(S): 25 TABLET, FILM COATED ORAL at 09:12

## 2021-10-14 RX ADMIN — AMLODIPINE BESYLATE 10 MILLIGRAM(S): 2.5 TABLET ORAL at 09:13

## 2021-10-14 RX ADMIN — LIDOCAINE 2 PATCH: 4 CREAM TOPICAL at 16:20

## 2021-10-14 RX ADMIN — FAMOTIDINE 20 MILLIGRAM(S): 10 INJECTION INTRAVENOUS at 20:55

## 2021-10-14 RX ADMIN — LISINOPRIL 5 MILLIGRAM(S): 2.5 TABLET ORAL at 09:13

## 2021-10-14 RX ADMIN — QUETIAPINE FUMARATE 125 MILLIGRAM(S): 200 TABLET, FILM COATED ORAL at 20:55

## 2021-10-14 NOTE — BH INPATIENT PSYCHIATRY PROGRESS NOTE - NSBHASSESSSUMMFT_PSY_ALL_CORE
Patient is a 80-year-old female, , mother, non-caregiver, domiciled alone, in private residence, retired dx of Bipolar I D/O, multiple psychiatric hospitalizations, most recently psychiatrically hospitalized at [facility]: 7/15/20-7/31/20 depression ( discharged medically and readmitted from 8/6/20-8/25/20 for depression, SI and anxiety where she rec’d 10 ECT treatments (no medication changes, was taking Lexapro and Seroquel), history of ECT with good response, currently in outpatient treatment with Dr. Donovan who prescribes Escitalopram 10mg, Amitriptyline 100mg, Nortriptyline 10mg and Seroquel 100mg, one suicide via cutting wrists about one year ago, no history of violence, no legal involvement, no NSSIB and no access to guns/weapons, presents [facility] with her daughter Yen requesting voluntary admission due to worsening sx’s of depression, SI and manic sx’s.  Clinical picture is significant for bipolar depression with worsening symptoms of depression and SI, multiple failed medication trials and recent SI with plan to cut wrist. Patient is seeking admission and is interested in ECT      ECT #7 was held due to cognitive issues reported by Dr. Steele ECT psychiatrist.  to reassess for further ECT.  Patient reports she does not want further ECT.      MOCA done 10/7=25    Plan:  c/o constipation, Miralax continued.  reports no constipatioen   Lexapro, Nortriptyline, Seroquel. DC Lexapro and Nortriptyline.  Started ECT on 9/22  ended at ECT #6 due to cognitive issues  DC Lexapro and Pamelor, begin Zoloft and increase to 50mg  continue Seroquel     sx improving  c/o back pain which she has had prior to hospitalization, Lidocaine patch and Tylenol prn provided with benefit.  Group program, milieu therapy - improved involvement, encouraged patient to engage in treatment.  Routine checks, no indication for constant observation as able to contract for safety in a locked, supervised setting.  Hospitalist added Lisinopril due to HTN

## 2021-10-14 NOTE — BH INPATIENT PSYCHIATRY PROGRESS NOTE - NSBHCHARTREVIEWVS_PSY_A_CORE FT
Vital Signs Last 24 Hrs  T(C): 37.1 (10-14-21 @ 07:19), Max: 37.1 (10-14-21 @ 07:19)  T(F): 98.8 (10-14-21 @ 07:19), Max: 98.8 (10-14-21 @ 07:19)  HR: --  BP: --  BP(mean): --  RR: --  SpO2: --    Orthostatic VS  10-14-21 @ 07:19  Lying BP: --/-- HR: --  Sitting BP: 123/69 HR: 79  Standing BP: 126/66 HR: 87  Site: upper left arm  Mode: electronic  Orthostatic VS  10-13-21 @ 07:58  Lying BP: --/-- HR: --  Sitting BP: 138/85 HR: 75  Standing BP: 137/77 HR: 86  Site: --  Mode: --

## 2021-10-14 NOTE — BH INPATIENT PSYCHIATRY PROGRESS NOTE - NSBHFUPINTERVALHXFT_PSY_A_CORE
Patient seen for follow up for bipolar depression, chart reviewed, case discussed in tx team meeting with interdisciplinary staff and with Dr. Schmidt  Patient continues to report she is less depressed and reports she thinks ECT helped her but she does not want ECT anymore.   She reports she thinks it makes her confused.  MOCA done, on 10/7 patient scored 25. ( indicative of mild cognitive impairment)     Appears brighter.  Patient has been attending groups on unit and is spending more time in the day area talking to select peers.  Patient reports sleeping and eating ok. Reports moving her bowels regularly, denies constipation.   Patient is Rx adherent and denies any SE and none are noted.  Denies any HI or SI.   Reports will tell staff if she has thoughts of harming self or others.  VS reviewed. BP improved today.    Patient seen for follow up for bipolar depression, chart reviewed, case discussed in tx team meeting with interdisciplinary staff and with Dr. Schmidt  Patient continues to report she is less depressed and reports she thinks ECT helped her but she does not want ECT anymore.   She reports she thinks it makes her confused.  MOCA done, on 10/7 patient scored 25. ( indicative of mild cognitive impairment)     Appears brighter.  Patient has been attending groups on unit and is spending more time in the day area talking to select peers.  Patient reports sleeping and eating ok. Reports moving her bowels regularly, denies constipation.   Patient is Rx adherent and denies any SE and none are noted.  Denies any HI or SI.   Reports will tell staff if she has thoughts of harming self or others.  Reports if she has SI at home, she will call one of her children, or her therapist or her psychiatrist or the Suicide Hotline or the [facility] and was given phone numbers of the Suicide Hotline and the [facility].  VS reviewed. BP improved today.

## 2021-10-15 LAB
ANION GAP SERPL CALC-SCNC: 14 MMOL/L — SIGNIFICANT CHANGE UP (ref 7–14)
BUN SERPL-MCNC: 16 MG/DL — SIGNIFICANT CHANGE UP (ref 7–23)
CALCIUM SERPL-MCNC: 10 MG/DL — SIGNIFICANT CHANGE UP (ref 8.4–10.5)
CHLORIDE SERPL-SCNC: 101 MMOL/L — SIGNIFICANT CHANGE UP (ref 98–107)
CO2 SERPL-SCNC: 24 MMOL/L — SIGNIFICANT CHANGE UP (ref 22–31)
CREAT SERPL-MCNC: 0.79 MG/DL — SIGNIFICANT CHANGE UP (ref 0.5–1.3)
GLUCOSE SERPL-MCNC: 130 MG/DL — HIGH (ref 70–99)
MAGNESIUM SERPL-MCNC: 2.1 MG/DL — SIGNIFICANT CHANGE UP (ref 1.6–2.6)
PHOSPHATE SERPL-MCNC: 2.9 MG/DL — SIGNIFICANT CHANGE UP (ref 2.5–4.5)
POTASSIUM SERPL-MCNC: 3.9 MMOL/L — SIGNIFICANT CHANGE UP (ref 3.5–5.3)
POTASSIUM SERPL-SCNC: 3.9 MMOL/L — SIGNIFICANT CHANGE UP (ref 3.5–5.3)
SODIUM SERPL-SCNC: 139 MMOL/L — SIGNIFICANT CHANGE UP (ref 135–145)

## 2021-10-15 PROCEDURE — 99232 SBSQ HOSP IP/OBS MODERATE 35: CPT | Mod: 25

## 2021-10-15 PROCEDURE — 99238 HOSP IP/OBS DSCHRG MGMT 30/<: CPT

## 2021-10-15 PROCEDURE — 90853 GROUP PSYCHOTHERAPY: CPT

## 2021-10-15 RX ORDER — SENNA PLUS 8.6 MG/1
2 TABLET ORAL
Qty: 60 | Refills: 0
Start: 2021-10-15 | End: 2021-11-13

## 2021-10-15 RX ORDER — POLYETHYLENE GLYCOL 3350 17 G/17G
17 POWDER, FOR SOLUTION ORAL
Qty: 510 | Refills: 0
Start: 2021-10-15 | End: 2021-11-13

## 2021-10-15 RX ORDER — SERTRALINE 25 MG/1
1 TABLET, FILM COATED ORAL
Qty: 30 | Refills: 0
Start: 2021-10-15 | End: 2021-11-13

## 2021-10-15 RX ORDER — FAMOTIDINE 10 MG/ML
1 INJECTION INTRAVENOUS
Qty: 60 | Refills: 0
Start: 2021-10-15 | End: 2021-11-13

## 2021-10-15 RX ORDER — LEVOTHYROXINE SODIUM 125 MCG
1 TABLET ORAL
Qty: 30 | Refills: 0
Start: 2021-10-15 | End: 2021-11-13

## 2021-10-15 RX ORDER — QUETIAPINE FUMARATE 200 MG/1
150 TABLET, FILM COATED ORAL AT BEDTIME
Refills: 0 | Status: DISCONTINUED | OUTPATIENT
Start: 2021-10-15 | End: 2021-10-18

## 2021-10-15 RX ORDER — LACTOBACILLUS ACIDOPHILUS 100MM CELL
1 CAPSULE ORAL
Qty: 30 | Refills: 0
Start: 2021-10-15 | End: 2021-11-13

## 2021-10-15 RX ORDER — LISINOPRIL 2.5 MG/1
1 TABLET ORAL
Qty: 30 | Refills: 0
Start: 2021-10-15 | End: 2021-11-13

## 2021-10-15 RX ORDER — QUETIAPINE FUMARATE 200 MG/1
3 TABLET, FILM COATED ORAL
Qty: 90 | Refills: 0
Start: 2021-10-15 | End: 2021-11-13

## 2021-10-15 RX ORDER — AMLODIPINE BESYLATE 2.5 MG/1
1 TABLET ORAL
Qty: 30 | Refills: 0
Start: 2021-10-15 | End: 2021-11-13

## 2021-10-15 RX ADMIN — FAMOTIDINE 20 MILLIGRAM(S): 10 INJECTION INTRAVENOUS at 09:08

## 2021-10-15 RX ADMIN — FAMOTIDINE 20 MILLIGRAM(S): 10 INJECTION INTRAVENOUS at 21:05

## 2021-10-15 RX ADMIN — LIDOCAINE 2 PATCH: 4 CREAM TOPICAL at 16:49

## 2021-10-15 RX ADMIN — SERTRALINE 50 MILLIGRAM(S): 25 TABLET, FILM COATED ORAL at 09:08

## 2021-10-15 RX ADMIN — LISINOPRIL 5 MILLIGRAM(S): 2.5 TABLET ORAL at 09:09

## 2021-10-15 RX ADMIN — Medication 75 MICROGRAM(S): at 06:16

## 2021-10-15 RX ADMIN — Medication 1 TABLET(S): at 09:08

## 2021-10-15 RX ADMIN — AMLODIPINE BESYLATE 10 MILLIGRAM(S): 2.5 TABLET ORAL at 09:10

## 2021-10-15 RX ADMIN — QUETIAPINE FUMARATE 150 MILLIGRAM(S): 200 TABLET, FILM COATED ORAL at 21:05

## 2021-10-15 RX ADMIN — POLYETHYLENE GLYCOL 3350 17 GRAM(S): 17 POWDER, FOR SOLUTION ORAL at 09:06

## 2021-10-15 NOTE — BH INPATIENT PSYCHIATRY DISCHARGE NOTE - NSBHDCHANDOFFNOFT_PSY_A_CORE
called Dr. Rucker  [phone] and was unable to leave a message as mailbox was full.  Called patient's son Darío [phone] and left message asking  him to have Dr. Rucker call me for sign out, as he texts Dr. Rucker

## 2021-10-15 NOTE — BH PSYCHOLOGY - GROUP THERAPY NOTE - NSPSYCHOLGRPGENPT_PSY_A_CORE FT
Pt was attentive, engaged and cooperative throughout group. She was engaged throughout group, participated in the breathing exercises. and shared relevant personal experiences freely. Pt attended Psychoeducation/Mindfulness skill group during which they were taught about flight, fight, and freeze responses and different situations in which this response can be helpful and unnecessary. Patients were asked to think about a problem they were experiencing and then reflect on how that problem made them feel somatically and emotionally. Pts then had the opportunity to share their reflections. Pts were subsequently taught a breathing exercise to help them manage unhelpful stress responses and practiced this exercise with the group. Finally, pts were asked to reflect on how the breathing exercise impacted them. Pts were engaged throughout group and interacted synergistically with each other and the group leaders.

## 2021-10-15 NOTE — BH INPATIENT PSYCHIATRY DISCHARGE NOTE - NSDCRECOMMENDMEDICALFT_PSY_ALL_CORE
Please follow up with your medical doctor within one week due to high blood pressure and ongoing medical care

## 2021-10-15 NOTE — BH INPATIENT PSYCHIATRY PROGRESS NOTE - NSBHFUPINTERVALHXFT_PSY_A_CORE
Patient seen for follow up for bipolar depression, chart reviewed, case discussed in tx team meeting with interdisciplinary staff and with Dr. Schmidt  Patient continues to report she is less depressed and reports she thinks ECT helped her but she does not want ECT anymore.   She reports she thinks it makes her confused.  MOCA done, on 10/7 patient scored 25. ( indicative of mild cognitive impairment)     Appears brighter.  Patient has been attending groups on unit and is spending more time in the day area talking to select peers.  Patient reports after DC, she will stay with her daughter for a while.  Discussed hx of illness with patient she denies manic sx, but did talk to son, and he reports patient has had some manic episodes, most recently in 2019, during which she was hyperverbal and very active.  Patient reports sleeping and eating ok. Reports moving her bowels regularly, denies constipation.   Patient is Rx adherent and denies any SE and none are noted.  No sx of TD/EPS are noted.  Denies any HI or SI.   Reports will tell staff if she has thoughts of harming self or others.  Reports if she has SI at home, she will call one of her children, or her therapist or her psychiatrist or the Suicide Hotline or the [facility] and was given phone numbers of the [facility]icide Hot[facility] and the [facility].  VS reviewed. BP improved today. Increase Seroquel   Patient seen for follow up for bipolar depression, chart reviewed, case discussed in tx team meeting with interdisciplinary staff and with Dr. Schmidt  Patient continues to report she is less depressed and reports she thinks ECT helped her but she does not want ECT anymore.   She reports she thinks it makes her confused.  MOCA done, on 10/7 patient scored 25. ( indicative of mild cognitive impairment)     Appears brighter.  Patient has been attending groups on unit and is spending more time in the day area talking to select peers.  Patient reports after DC, she will stay with her daughter for a while.  Discussed hx of illness with patient she denies manic sx, but did talk to son, and he reports patient has had some manic episodes, most recently in 2019, during which she was hyperverbal and very active.  Patient reports sleeping and eating ok. Reports moving her bowels regularly, denies constipation.   Patient is Rx adherent and denies any SE and none are noted.  No sx of TD/EPS are noted.  Denies any HI or SI.   Reports will tell staff if she has thoughts of harming self or others.  Reports if she has SI at home, she will call one of her children, or her therapist or her psychiatrist or the Suicide Hotline or the [facility] and was given phone numbers of the [facility]icide Hot[facility] and the [facility].  Reports she feels hopeful about her future, and IDs her grandchildren as reasons to live.  VS reviewed. BP improved today. Increase Seroquel

## 2021-10-15 NOTE — BH PSYCHOLOGY - CLINICIAN PSYCHOTHERAPY NOTE - NSBHPSYCHOLINT_PSY_A_CORE
Supportive therapy/Treatment compliance encouraged
Supported coping skills/Supportive therapy/Treatment compliance encouraged

## 2021-10-15 NOTE — BH PSYCHOLOGY - CLINICIAN PSYCHOTHERAPY NOTE - NSBHPSYCHOLNARRATIVE_PSY_A_CORE FT
Writer met with pt. to determine pt's interest in receiving individual psychotherapy. Pt presented with a depressed mood with a congruent affect with full range of emotion. Pt. at times laughed in the session. Pt's thought process appeared linear but concrete, as evidenced by pt. giving very brief responses with little elaboration. Pt. presented as ambivalent about treatment, stating she was willing to engage; however, pt. could not identify treatment goals at the time of the meeting. Pt. left the room during the meeting to attend to something and reported that she would not be returning. However, pt. expressed openness to attending DBT group. 
Pt. presented with a neutral mood and congruent affect. Pt's thought process was linear; however, pt. demonstrated some mild difficulties with recall, which pt. attributed to a side effect of ECT. Pt.'s thought content was related to the discussion. Speech rhythm and volume WNL. Pt. rated her depression as a "1" on a scale of 1-5, with 5 being the worst. She reported that she does not recall feeling suicidal but knows that she was feeling suicidal prior to admission, as this prompted her children to seek hospitalization. Pt. is engaging in groups on the unit and reported that she enjoyed speaking during today's psychotherapy session. The focus of today's session centered on relapse prevention and completing pt safety plan in an effort to assist pt. in recognizing warning signs for worsening symptoms as well as to identify supports and ways to cope. In particular, writer and pt. discussed ways in which pt. can connect to community resources in order to avoid extended periods of isolation in her home, as pt. identified isolation as a warning sign for worsening depression. Writer and pt. explored potential libraries or senior centers in pt's town that she might be able to attend. Pt. also reported that she might like to get back involved with her exercise class at the local [facility], which pt. reported attending prior to increased depressive symptoms. Writer and pt. also explored compensatory strategies pt. can utilize to cope with memory difficulties. In particular, writer encouraged pt. to write down important information in a notebook that she keeps near her or in a particular location.

## 2021-10-15 NOTE — BH INPATIENT PSYCHIATRY PROGRESS NOTE - NSBHCHARTREVIEWVS_PSY_A_CORE FT
Vital Signs Last 24 Hrs  T(C): 36.5 (10-15-21 @ 17:18), Max: 36.9 (10-15-21 @ 07:47)  T(F): 97.7 (10-15-21 @ 17:18), Max: 98.4 (10-15-21 @ 07:47)  HR: --  BP: --  BP(mean): --  RR: 18 (10-15-21 @ 07:47) (18 - 18)  SpO2: --    Orthostatic VS  10-15-21 @ 07:47  Lying BP: --/-- HR: --  Sitting BP: 136/65 HR: 76  Standing BP: 128/77 HR: 89  Site: --  Mode: --  Orthostatic VS  10-14-21 @ 07:19  Lying BP: --/-- HR: --  Sitting BP: 123/69 HR: 79  Standing BP: 126/66 HR: 87  Site: upper left arm  Mode: electronic
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
## 2021-10-15 NOTE — BH PSYCHOLOGY - CLINICIAN PSYCHOTHERAPY NOTE - NSBHPSYCHOLGOALS_PSY_A_CORE
Decrease symptoms/Improve level of independent functioning/Improve social/vocational/coping skills/Prevent relapse/Psychoeducation/Treatment compliance
Assessment

## 2021-10-15 NOTE — BH INPATIENT PSYCHIATRY DISCHARGE NOTE - NSDCMRMEDTOKEN_GEN_ALL_CORE_FT
amLODIPine 10 mg oral tablet: 1 tab(s) orally once a day x 30 days  for HTN   famotidine 20 mg oral tablet: 1 tab(s) orally 2 times a day x 30 days  for GERD   lactobacillus acidophilus oral capsule: 1 tab(s) orally once a day x 30 days  for bowel regimen   levothyroxine 75 mcg (0.075 mg) oral tablet: 1 tab(s) orally once a day x 30 days  one hour before breakfast with water only   lisinopril 5 mg oral tablet: 1 tab(s) orally once a day x 30 days  for HTN   polyethylene glycol 3350 oral powder for reconstitution: 17 gram(s) orally once a day x 30 days  for constipation   QUEtiapine 50 mg oral tablet: 3 tab(s) orally once a day (at bedtime) x 30 days  for mood   senna oral tablet: 2 tab(s) orally once a day (at bedtime) x 30 days  for constipation  sertraline 50 mg oral tablet: 1 tab(s) orally once a day x 30 days  for depression

## 2021-10-15 NOTE — BH INPATIENT PSYCHIATRY PROGRESS NOTE - NSBHASSESSSUMMFT_PSY_ALL_CORE
Patient is a 80-year-old female, , mother, non-caregiver, domiciled alone, in private residence, retired dx of Bipolar I D/O, multiple psychiatric hospitalizations, most recently psychiatrically hospitalized at [facility]: 7/15/20-7/31/20 depression ( discharged medically and readmitted from 8/6/20-8/25/20 for depression, SI and anxiety where she rec’d 10 ECT treatments (no medication changes, was taking Lexapro and Seroquel), history of ECT with good response, currently in outpatient treatment with Dr. Donovan who prescribes Escitalopram 10mg, Amitriptyline 100mg, Nortriptyline 10mg and Seroquel 100mg, one suicide via cutting wrists about one year ago, no history of violence, no legal involvement, no NSSIB and no access to guns/weapons, presents [facility] with her daughter Yen requesting voluntary admission due to worsening sx’s of depression, SI and manic sx’s.  Clinical picture is significant for bipolar depression with worsening symptoms of depression and SI, multiple failed medication trials and recent SI with plan to cut wrist. Patient is seeking admission and is interested in ECT      ECT #7 was held due to cognitive issues reported by Dr. Steele ECT psychiatrist.  to reassess for further ECT.  Patient reports she does not want further ECT.      MOCA done 10/7=25    Plan:  c/o constipation, Miralax continued.  reports no constipatioen   Lexapro, Nortriptyline, Seroquel. DC Lexapro and Nortriptyline.  Started ECT on 9/22  ended at ECT #6 due to cognitive issues  DC Lexapro and Pamelor, begin Zoloft and increase to 50mg  increase Seroquel to 200mg  sx improving  c/o back pain which she has had prior to hospitalization, Lidocaine patch and Tylenol prn provided with benefit.  Group program, milieu therapy - improved involvement, encouraged patient to engage in treatment.  Routine checks, no indication for constant observation as able to contract for safety in a locked, supervised setting.  Hospitalist added Lisinopril due to HTN Patient is a 80-year-old female, , mother, non-caregiver, domiciled alone, in private residence, retired dx of Bipolar I D/O, multiple psychiatric hospitalizations, most recently psychiatrically hospitalized at [facility]: 7/15/20-7/31/20 depression ( discharged medically and readmitted from 8/6/20-8/25/20 for depression, SI and anxiety where she rec’d 10 ECT treatments (no medication changes, was taking Lexapro and Seroquel), history of ECT with good response, currently in outpatient treatment with Dr. Donovan who prescribes Escitalopram 10mg, Amitriptyline 100mg, Nortriptyline 10mg and Seroquel 100mg, one suicide via cutting wrists about one year ago, no history of violence, no legal involvement, no NSSIB and no access to guns/weapons, presents [facility] with her daughter Yen requesting voluntary admission due to worsening sx’s of depression, SI and manic sx’s.  Clinical picture is significant for bipolar depression with worsening symptoms of depression and SI, multiple failed medication trials and recent SI with plan to cut wrist. Patient is seeking admission and is interested in ECT      ECT #7 was held due to cognitive issues reported by Dr. Steele ECT psychiatrist.  to reassess for further ECT.  Patient reports she does not want further ECT.      MOCA done 10/7=25    Plan:  c/o constipation, Miralax continued.  reports no constipatioen   Lexapro, Nortriptyline, Seroquel. DC Lexapro and Nortriptyline.  Started ECT on 9/22  ended at ECT #6 due to cognitive issues  DC Lexapro and Pamelor, begin Zoloft and increase to 50mg  increase Seroquel to 150mg  sx improving  c/o back pain which she has had prior to hospitalization, Lidocaine patch and Tylenol prn provided with benefit.  Group program, milieu therapy - improved involvement, encouraged patient to engage in treatment.  Routine checks, no indication for constant observation as able to contract for safety in a locked, supervised setting.  Hospitalist added Lisinopril due to HTN

## 2021-10-15 NOTE — BH INPATIENT PSYCHIATRY DISCHARGE NOTE - NSDCCPCAREPLAN_GEN_ALL_CORE_FT
PRINCIPAL DISCHARGE DIAGNOSIS  Diagnosis: Depressed bipolar I disorder  Assessment and Plan of Treatment:

## 2021-10-15 NOTE — BH INPATIENT PSYCHIATRY DISCHARGE NOTE - HOSPITAL COURSE
The patient has continued to show improvement in symptoms. She reports her depression is much improved after #6 ECT treatments.  Patient was unable to continue with ECT due to cognitive issues. MOCA score was 25. She is doing well with Zoloft and Seroquel.  She denies any SI, and her behavior has been well controlled. She reports eating and sleeping well. She has been engaged in treatment on the unit and reports that group  treatment has been  helpful to her. She is Rx compliant and reports she will continue with aftercare treatment. She has support from her family which is a protective factor for her. She is able to safety plan appropriately. The patient's risk cannot be further decreased with continued inpatient hospitalization. She is no longer an acute danger to self or others and is stable for discharge home.

## 2021-10-16 RX ADMIN — AMLODIPINE BESYLATE 10 MILLIGRAM(S): 2.5 TABLET ORAL at 08:41

## 2021-10-16 RX ADMIN — Medication 1 TABLET(S): at 08:40

## 2021-10-16 RX ADMIN — FAMOTIDINE 20 MILLIGRAM(S): 10 INJECTION INTRAVENOUS at 08:41

## 2021-10-16 RX ADMIN — LISINOPRIL 5 MILLIGRAM(S): 2.5 TABLET ORAL at 08:42

## 2021-10-16 RX ADMIN — LIDOCAINE 2 PATCH: 4 CREAM TOPICAL at 15:43

## 2021-10-16 RX ADMIN — Medication 75 MICROGRAM(S): at 06:25

## 2021-10-16 RX ADMIN — QUETIAPINE FUMARATE 150 MILLIGRAM(S): 200 TABLET, FILM COATED ORAL at 21:08

## 2021-10-16 RX ADMIN — SERTRALINE 50 MILLIGRAM(S): 25 TABLET, FILM COATED ORAL at 08:42

## 2021-10-16 RX ADMIN — FAMOTIDINE 20 MILLIGRAM(S): 10 INJECTION INTRAVENOUS at 21:08

## 2021-10-17 RX ORDER — LOPERAMIDE HCL 2 MG
2 TABLET ORAL ONCE
Refills: 0 | Status: COMPLETED | OUTPATIENT
Start: 2021-10-17 | End: 2021-10-17

## 2021-10-17 RX ADMIN — LISINOPRIL 5 MILLIGRAM(S): 2.5 TABLET ORAL at 08:27

## 2021-10-17 RX ADMIN — QUETIAPINE FUMARATE 150 MILLIGRAM(S): 200 TABLET, FILM COATED ORAL at 20:36

## 2021-10-17 RX ADMIN — LIDOCAINE 2 PATCH: 4 CREAM TOPICAL at 15:44

## 2021-10-17 RX ADMIN — FAMOTIDINE 20 MILLIGRAM(S): 10 INJECTION INTRAVENOUS at 20:36

## 2021-10-17 RX ADMIN — Medication 2 MILLIGRAM(S): at 15:44

## 2021-10-17 RX ADMIN — Medication 1 MILLIGRAM(S): at 18:07

## 2021-10-17 RX ADMIN — FAMOTIDINE 20 MILLIGRAM(S): 10 INJECTION INTRAVENOUS at 08:25

## 2021-10-17 RX ADMIN — Medication 75 MICROGRAM(S): at 06:22

## 2021-10-17 RX ADMIN — AMLODIPINE BESYLATE 10 MILLIGRAM(S): 2.5 TABLET ORAL at 08:26

## 2021-10-17 RX ADMIN — SERTRALINE 50 MILLIGRAM(S): 25 TABLET, FILM COATED ORAL at 08:26

## 2021-10-17 RX ADMIN — Medication 1 TABLET(S): at 08:27

## 2021-10-18 VITALS — TEMPERATURE: 99 F

## 2021-10-18 PROCEDURE — 99238 HOSP IP/OBS DSCHRG MGMT 30/<: CPT

## 2021-10-18 RX ADMIN — Medication 75 MICROGRAM(S): at 06:23

## 2021-10-18 RX ADMIN — LISINOPRIL 5 MILLIGRAM(S): 2.5 TABLET ORAL at 09:04

## 2021-10-18 RX ADMIN — AMLODIPINE BESYLATE 10 MILLIGRAM(S): 2.5 TABLET ORAL at 09:05

## 2021-10-18 RX ADMIN — FAMOTIDINE 20 MILLIGRAM(S): 10 INJECTION INTRAVENOUS at 09:04

## 2021-10-18 RX ADMIN — Medication 1 TABLET(S): at 09:05

## 2021-10-18 RX ADMIN — SERTRALINE 50 MILLIGRAM(S): 25 TABLET, FILM COATED ORAL at 09:05

## 2021-10-18 NOTE — BH INPATIENT PSYCHIATRY PROGRESS NOTE - NSBHMSEAFFCONG_PSY_A_CORE
Congruent

## 2021-10-18 NOTE — BH INPATIENT PSYCHIATRY PROGRESS NOTE - NSTXCOPEDATETRGT_PSY_ALL_CORE
23-Sep-2021
23-Sep-2021
30-Sep-2021
23-Sep-2021
07-Oct-2021
23-Sep-2021
07-Oct-2021
23-Sep-2021
21-Oct-2021
30-Sep-2021
07-Oct-2021
30-Sep-2021
14-Oct-2021
14-Oct-2021
07-Oct-2021
14-Oct-2021
30-Sep-2021
30-Sep-2021
23-Sep-2021
21-Oct-2021
21-Oct-2021
07-Oct-2021

## 2021-10-18 NOTE — BH INPATIENT PSYCHIATRY PROGRESS NOTE - NSBHMSETHTPROC_PSY_A_CORE
Linear
Linear/Normal reasoning
Linear
Linear/Normal reasoning
Linear
Linear/Normal reasoning

## 2021-10-18 NOTE — BH INPATIENT PSYCHIATRY PROGRESS NOTE - MSE OPTIONS
Structured MSE
Unstructured MSE
Structured MSE
Unstructured MSE
Structured MSE

## 2021-10-18 NOTE — BH INPATIENT PSYCHIATRY PROGRESS NOTE - NSTXECTPROGRES_PSY_ALL_CORE
Improving
Met - goal discontinued
Improving
Met - goal discontinued
Improving
Met - goal discontinued

## 2021-10-18 NOTE — BH INPATIENT PSYCHIATRY PROGRESS NOTE - NSBHASSESSSUMMFT_PSY_ALL_CORE
Patient is a 80-year-old female, , mother, non-caregiver, domiciled alone, in private residence, retired dx of Bipolar I D/O, multiple psychiatric hospitalizations, most recently psychiatrically hospitalized at [facility]: 7/15/20-7/31/20 depression ( discharged medically and readmitted from 8/6/20-8/25/20 for depression, SI and anxiety where she rec’d 10 ECT treatments (no medication changes, was taking Lexapro and Seroquel), history of ECT with good response, currently in outpatient treatment with Dr. Donovan who prescribes Escitalopram 10mg, Amitriptyline 100mg, Nortriptyline 10mg and Seroquel 100mg, one suicide via cutting wrists about one year ago, no history of violence, no legal involvement, no NSSIB and no access to guns/weapons, presents [facility] with her daughter Yen requesting voluntary admission due to worsening sx’s of depression, SI and manic sx’s.  Clinical picture is significant for bipolar depression with worsening symptoms of depression and SI, multiple failed medication trials and recent SI with plan to cut wrist. Patient is seeking admission and is interested in ECT      ECT #7 was held due to cognitive issues reported by Dr. Steele ECT psychiatrist.  to reassess for further ECT.  Patient reports she does not want further ECT.      MOCA done 10/7=25    Plan:  c/o constipation, Miralax continued.  reports no constipation   Lexapro, Nortriptyline, Seroquel. DC Lexapro and Nortriptyline.  Started ECT on 9/22  ended at ECT #6 due to cognitive issues  DC Lexapro and Pamelor, begin Zoloft and increase to 50mg  increase Seroquel to 150mg  sx improving  c/o back pain which she has had prior to hospitalization, Lidocaine patch and Tylenol prn provided with benefit.  Group program, milieu therapy - improved involvement, encouraged patient to engage in treatment.  Routine checks, no indication for constant observation as able to contract for safety in a locked, supervised setting.  Hospitalist added Lisinopril due to HTN

## 2021-10-18 NOTE — BH DISCHARGE NOTE NURSING/SOCIAL WORK/PSYCH REHAB - DISCHARGE INSTRUCTIONS AFTERCARE APPOINTMENTS
In order to check the location, date, or time of your aftercare appointment, please refer to your Discharge Instructions Document given to you upon leaving the hospital.  If you have lost the instructions please call [phone]

## 2021-10-18 NOTE — BH INPATIENT PSYCHIATRY PROGRESS NOTE - NSBHMSEMOVE_PSY_A_CORE
No abnormal movements

## 2021-10-18 NOTE — BH INPATIENT PSYCHIATRY PROGRESS NOTE - NSBHMETABOLIC_PSY_ALL_CORE_FT
BMI: BMI (kg/m2): 29.7 (09-15-21 @ 16:33)  HbA1c: A1C with Estimated Average Glucose Result: 5.4 % (09-16-21 @ 10:37)    Glucose: POCT Blood Glucose.: 129 mg/dL (09-18-21 @ 05:33)    BP: 144/82 (10-06-21 @ 11:02) (110/59 - 173/76)  Lipid Panel: Date/Time: 09-16-21 @ 10:37  Cholesterol, Serum: 241  Direct LDL: --  HDL Cholesterol, Serum: 72  Total Cholesterol/HDL Ration Measurement: --  Triglycerides, Serum: 84  
BMI: BMI (kg/m2): 29.7 (09-15-21 @ 16:33)  HbA1c: A1C with Estimated Average Glucose Result: 5.4 % (09-16-21 @ 10:37)    Glucose: POCT Blood Glucose.: 129 mg/dL (09-18-21 @ 05:33)    BP: 142/62 (09-29-21 @ 13:20) (142/62 - 177/74)  Lipid Panel: Date/Time: 09-16-21 @ 10:37  Cholesterol, Serum: 241  Direct LDL: --  HDL Cholesterol, Serum: 72  Total Cholesterol/HDL Ration Measurement: --  Triglycerides, Serum: 84  
BMI: BMI (kg/m2): 29.7 (09-15-21 @ 16:33)  HbA1c: A1C with Estimated Average Glucose Result: 5.4 % (09-16-21 @ 10:37)    Glucose: POCT Blood Glucose.: 129 mg/dL (09-18-21 @ 05:33)    BP: --  Lipid Panel: Date/Time: 09-16-21 @ 10:37  Cholesterol, Serum: 241  Direct LDL: --  HDL Cholesterol, Serum: 72  Total Cholesterol/HDL Ration Measurement: --  Triglycerides, Serum: 84  
BMI: BMI (kg/m2): 29.7 (09-15-21 @ 16:33)  HbA1c: A1C with Estimated Average Glucose Result: 5.4 % (09-16-21 @ 10:37)    Glucose: POCT Blood Glucose.: 129 mg/dL (09-18-21 @ 05:33)    BP: 144/82 (10-06-21 @ 11:02) (144/82 - 173/76)  Lipid Panel: Date/Time: 09-16-21 @ 10:37  Cholesterol, Serum: 241  Direct LDL: --  HDL Cholesterol, Serum: 72  Total Cholesterol/HDL Ration Measurement: --  Triglycerides, Serum: 84  
BMI: BMI (kg/m2): 29.7 (09-15-21 @ 16:33)  HbA1c: A1C with Estimated Average Glucose Result: 5.4 % (09-16-21 @ 10:37)    Glucose: POCT Blood Glucose.: 129 mg/dL (09-18-21 @ 05:33)    BP: 144/82 (10-06-21 @ 11:02) (144/82 - 144/82)  Lipid Panel: Date/Time: 09-16-21 @ 10:37  Cholesterol, Serum: 241  Direct LDL: --  HDL Cholesterol, Serum: 72  Total Cholesterol/HDL Ration Measurement: --  Triglycerides, Serum: 84  
BMI: BMI (kg/m2): 29.7 (09-15-21 @ 16:33)  HbA1c: A1C with Estimated Average Glucose Result: 5.4 % (09-16-21 @ 10:37)    Glucose: POCT Blood Glucose.: 129 mg/dL (09-18-21 @ 05:33)    BP: --  Lipid Panel: Date/Time: 09-16-21 @ 10:37  Cholesterol, Serum: 241  Direct LDL: --  HDL Cholesterol, Serum: 72  Total Cholesterol/HDL Ration Measurement: --  Triglycerides, Serum: 84  
BMI: BMI (kg/m2): 29.7 (09-15-21 @ 16:33)  HbA1c: A1C with Estimated Average Glucose Result: 5.4 % (09-16-21 @ 10:37)    Glucose: POCT Blood Glucose.: 129 mg/dL (09-18-21 @ 05:33)    BP: 160/76 (09-24-21 @ 12:50) (134/67 - 199/98)  Lipid Panel: Date/Time: 09-16-21 @ 10:37  Cholesterol, Serum: 241  Direct LDL: --  HDL Cholesterol, Serum: 72  Total Cholesterol/HDL Ration Measurement: --  Triglycerides, Serum: 84  
BMI: BMI (kg/m2): 29.7 (09-15-21 @ 16:33)  HbA1c: A1C with Estimated Average Glucose Result: 5.4 % (09-16-21 @ 10:37)    Glucose: POCT Blood Glucose.: 129 mg/dL (09-18-21 @ 05:33)    BP: 134/67 (09-22-21 @ 11:37) (134/67 - 134/67)  Lipid Panel: Date/Time: 09-16-21 @ 10:37  Cholesterol, Serum: 241  Direct LDL: --  HDL Cholesterol, Serum: 72  Total Cholesterol/HDL Ration Measurement: --  Triglycerides, Serum: 84  
BMI: BMI (kg/m2): 29.7 (09-15-21 @ 16:33)  HbA1c: A1C with Estimated Average Glucose Result: 5.4 % (09-16-21 @ 10:37)    Glucose:   BP: --  Lipid Panel: Date/Time: 09-16-21 @ 10:37  Cholesterol, Serum: 241  Direct LDL: --  HDL Cholesterol, Serum: 72  Total Cholesterol/HDL Ration Measurement: --  Triglycerides, Serum: 84  
BMI: BMI (kg/m2): 29.7 (09-15-21 @ 16:33)  HbA1c: A1C with Estimated Average Glucose Result: 5.4 % (09-16-21 @ 10:37)    Glucose: POCT Blood Glucose.: 129 mg/dL (09-18-21 @ 05:33)    BP: 162/70 (09-27-21 @ 15:55) (150/71 - 177/74)  Lipid Panel: Date/Time: 09-16-21 @ 10:37  Cholesterol, Serum: 241  Direct LDL: --  HDL Cholesterol, Serum: 72  Total Cholesterol/HDL Ration Measurement: --  Triglycerides, Serum: 84  
BMI: BMI (kg/m2): 29.7 (09-15-21 @ 16:33)  HbA1c: A1C with Estimated Average Glucose Result: 5.4 % (09-16-21 @ 10:37)    Glucose:   BP: --  Lipid Panel: Date/Time: 09-16-21 @ 10:37  Cholesterol, Serum: 241  Direct LDL: --  HDL Cholesterol, Serum: 72  Total Cholesterol/HDL Ration Measurement: --  Triglycerides, Serum: 84  
BMI: BMI (kg/m2): 29.7 (09-15-21 @ 16:33)  HbA1c: A1C with Estimated Average Glucose Result: 5.4 % (09-16-21 @ 10:37)    Glucose: POCT Blood Glucose.: 129 mg/dL (09-18-21 @ 05:33)    BP: --  Lipid Panel: Date/Time: 09-16-21 @ 10:37  Cholesterol, Serum: 241  Direct LDL: --  HDL Cholesterol, Serum: 72  Total Cholesterol/HDL Ration Measurement: --  Triglycerides, Serum: 84  
BMI: BMI (kg/m2): 29.7 (09-15-21 @ 16:33)  HbA1c: A1C with Estimated Average Glucose Result: 5.4 % (09-16-21 @ 10:37)    Glucose: POCT Blood Glucose.: 129 mg/dL (09-18-21 @ 05:33)    BP: --  Lipid Panel: Date/Time: 09-16-21 @ 10:37  Cholesterol, Serum: 241  Direct LDL: --  HDL Cholesterol, Serum: 72  Total Cholesterol/HDL Ration Measurement: --  Triglycerides, Serum: 84  
BMI: BMI (kg/m2): 29.7 (09-15-21 @ 16:33)  HbA1c: A1C with Estimated Average Glucose Result: 5.4 % (09-16-21 @ 10:37)    Glucose: POCT Blood Glucose.: 129 mg/dL (09-18-21 @ 05:33)    BP: 160/76 (09-24-21 @ 12:50) (136/84 - 160/76)  Lipid Panel: Date/Time: 09-16-21 @ 10:37  Cholesterol, Serum: 241  Direct LDL: --  HDL Cholesterol, Serum: 72  Total Cholesterol/HDL Ration Measurement: --  Triglycerides, Serum: 84  
BMI: BMI (kg/m2): 29.7 (09-15-21 @ 16:33)  HbA1c: A1C with Estimated Average Glucose Result: 5.4 % (09-16-21 @ 10:37)    Glucose: POCT Blood Glucose.: 129 mg/dL (09-18-21 @ 05:33)    BP: --  Lipid Panel: Date/Time: 09-16-21 @ 10:37  Cholesterol, Serum: 241  Direct LDL: --  HDL Cholesterol, Serum: 72  Total Cholesterol/HDL Ration Measurement: --  Triglycerides, Serum: 84  
BMI: BMI (kg/m2): 28.9 (10-15-21 @ 07:47)  HbA1c: A1C with Estimated Average Glucose Result: 5.4 % (09-16-21 @ 10:37)    Glucose: POCT Blood Glucose.: 129 mg/dL (09-18-21 @ 05:33)    BP: --  Lipid Panel: Date/Time: 09-16-21 @ 10:37  Cholesterol, Serum: 241  Direct LDL: --  HDL Cholesterol, Serum: 72  Total Cholesterol/HDL Ration Measurement: --  Triglycerides, Serum: 84  
BMI: BMI (kg/m2): 29.7 (09-15-21 @ 16:33)  HbA1c: A1C with Estimated Average Glucose Result: 5.4 % (09-16-21 @ 10:37)    Glucose: POCT Blood Glucose.: 129 mg/dL (09-18-21 @ 05:33)    BP: 142/62 (09-29-21 @ 13:20) (142/62 - 166/58)  Lipid Panel: Date/Time: 09-16-21 @ 10:37  Cholesterol, Serum: 241  Direct LDL: --  HDL Cholesterol, Serum: 72  Total Cholesterol/HDL Ration Measurement: --  Triglycerides, Serum: 84  
BMI: BMI (kg/m2): 28.9 (10-15-21 @ 07:47)  HbA1c: A1C with Estimated Average Glucose Result: 5.4 % (09-16-21 @ 10:37)    Glucose: POCT Blood Glucose.: 129 mg/dL (09-18-21 @ 05:33)    BP: --  Lipid Panel: Date/Time: 09-16-21 @ 10:37  Cholesterol, Serum: 241  Direct LDL: --  HDL Cholesterol, Serum: 72  Total Cholesterol/HDL Ration Measurement: --  Triglycerides, Serum: 84  
BMI: BMI (kg/m2): 29.7 (09-15-21 @ 16:33)  HbA1c: A1C with Estimated Average Glucose Result: 5.4 % (09-16-21 @ 10:37)    Glucose: POCT Blood Glucose.: 129 mg/dL (09-18-21 @ 05:33)    BP: 173/76 (10-04-21 @ 05:42) (110/59 - 173/76)  Lipid Panel: Date/Time: 09-16-21 @ 10:37  Cholesterol, Serum: 241  Direct LDL: --  HDL Cholesterol, Serum: 72  Total Cholesterol/HDL Ration Measurement: --  Triglycerides, Serum: 84  
BMI: BMI (kg/m2): 29.7 (09-15-21 @ 16:33)  HbA1c: A1C with Estimated Average Glucose Result: 5.4 % (09-16-21 @ 10:37)    Glucose: POCT Blood Glucose.: 129 mg/dL (09-18-21 @ 05:33)    BP: 144/87 (10-04-21 @ 17:54) (110/59 - 173/76)  Lipid Panel: Date/Time: 09-16-21 @ 10:37  Cholesterol, Serum: 241  Direct LDL: --  HDL Cholesterol, Serum: 72  Total Cholesterol/HDL Ration Measurement: --  Triglycerides, Serum: 84  
BMI: BMI (kg/m2): 29.7 (09-15-21 @ 16:33)  HbA1c: A1C with Estimated Average Glucose Result: 5.4 % (09-16-21 @ 10:37)    Glucose: POCT Blood Glucose.: 129 mg/dL (09-18-21 @ 05:33)    BP: --  Lipid Panel: Date/Time: 09-16-21 @ 10:37  Cholesterol, Serum: 241  Direct LDL: --  HDL Cholesterol, Serum: 72  Total Cholesterol/HDL Ration Measurement: --  Triglycerides, Serum: 84  
BMI: BMI (kg/m2): 29.7 (09-15-21 @ 16:33)  HbA1c: A1C with Estimated Average Glucose Result: 5.4 % (09-16-21 @ 10:37)    Glucose: POCT Blood Glucose.: 129 mg/dL (09-18-21 @ 05:33)    BP: 148/59 (10-01-21 @ 13:35) (142/62 - 173/75)  Lipid Panel: Date/Time: 09-16-21 @ 10:37  Cholesterol, Serum: 241  Direct LDL: --  HDL Cholesterol, Serum: 72  Total Cholesterol/HDL Ration Measurement: --  Triglycerides, Serum: 84  
BMI: BMI (kg/m2): 29.7 (09-15-21 @ 16:33)  HbA1c: A1C with Estimated Average Glucose Result: 5.4 % (09-16-21 @ 10:37)    Glucose: POCT Blood Glucose.: 129 mg/dL (09-18-21 @ 05:33)    BP: --  Lipid Panel: Date/Time: 09-16-21 @ 10:37  Cholesterol, Serum: 241  Direct LDL: --  HDL Cholesterol, Serum: 72  Total Cholesterol/HDL Ration Measurement: --  Triglycerides, Serum: 84  
BMI: BMI (kg/m2): 29.7 (09-15-21 @ 16:33)  HbA1c: A1C with Estimated Average Glucose Result: 5.4 % (09-16-21 @ 10:37)    Glucose: POCT Blood Glucose.: 129 mg/dL (09-18-21 @ 05:33)    BP: 154/78 (09-22-21 @ 20:36) (134/67 - 199/98)  Lipid Panel: Date/Time: 09-16-21 @ 10:37  Cholesterol, Serum: 241  Direct LDL: --  HDL Cholesterol, Serum: 72  Total Cholesterol/HDL Ration Measurement: --  Triglycerides, Serum: 84  
BMI: BMI (kg/m2): 29.7 (09-15-21 @ 16:33)  HbA1c: A1C with Estimated Average Glucose Result: 5.4 % (09-16-21 @ 10:37)    Glucose: POCT Blood Glucose.: 129 mg/dL (09-18-21 @ 05:33)    BP: --  Lipid Panel: Date/Time: 09-16-21 @ 10:37  Cholesterol, Serum: 241  Direct LDL: --  HDL Cholesterol, Serum: 72  Total Cholesterol/HDL Ration Measurement: --  Triglycerides, Serum: 84

## 2021-10-18 NOTE — BH PSYCHOLOGY - GROUP THERAPY NOTE - NSPSYCHOLGRPDBTINT_PSY_A_CORE
reviewed distress tolerance, skills homework
reviewed interpersonal effectiveness homework
reviewed distress tolerance, skills homework
review emotion regulation homework
reviewed distress tolerance, skills homework

## 2021-10-18 NOTE — BH INPATIENT PSYCHIATRY PROGRESS NOTE - NSBHMSEJUDGE_PSY_A_CORE
Fair

## 2021-10-18 NOTE — BH INPATIENT PSYCHIATRY PROGRESS NOTE - NSBHATTESTSEENBY_PSY_A_CORE
attending Psychiatrist without NP/Trainee
NP without Attending Psychiatrist
attending Psychiatrist without NP/Trainee
attending Psychiatrist without NP/Trainee
NP without Attending Psychiatrist
attending Psychiatrist without NP/Trainee
NP without Attending Psychiatrist
NP without Attending Psychiatrist
attending Psychiatrist without NP/Trainee
NP without Attending Psychiatrist
NP without Attending Psychiatrist

## 2021-10-18 NOTE — BH INPATIENT PSYCHIATRY PROGRESS NOTE - NSBHCONSDANGERSELF_PSY_A_CORE
suicidal behavior

## 2021-10-18 NOTE — BH INPATIENT PSYCHIATRY PROGRESS NOTE - NSTXSUICIDINTERMD_PSY_ALL_CORE
ECT and medication management   denies current SI
ECT and medication management
ECT and medication management   denies current SI
medication management   denies current SI
ECT and medication management   denies current SI
medication management   denies current SI
medication management   denies current SI
ECT and medication management
ECT and medication management   denies current SI
ECT and medication management
ECT and medication management   denies current SI
medication management   denies current SI
ECT and medication management   denies current SI
ECT and medication management
ECT and medication management   denies current SI
ECT and medication management   denies current SI
medication management   denies current SI
ECT and medication management   denies current SI

## 2021-10-18 NOTE — BH PSYCHOLOGY - GROUP THERAPY NOTE - NSPSYCHOLGRPDBTINTR_PSY_A_CORE FT
n/a
n/a
Patient attended the DBT Skills Acquisition group today, which takes place daily and is invite only. Group began with introductions, skills check in, and a mindfulness exercise. Group members shared their feedback about the mindfulness exercise. Today's group focused on the Interpersonal Effectiveness module and reviewed the definition of dialectics, how to think and act dialectically, and important opposites to balance. Group members took turns reading aloud from the group handouts and engaged in discussion about dialectics. Group leaders encouraged group members to practice the skill outside of group using the practice worksheet. 
n/a
n/a

## 2021-10-18 NOTE — BH INPATIENT PSYCHIATRY PROGRESS NOTE - NSTXECTGOAL_PSY_ALL_CORE
Maintain NPO status as indicated prior to procedure

## 2021-10-18 NOTE — BH INPATIENT PSYCHIATRY PROGRESS NOTE - NSBHMSEAFFQUAL_PSY_A_CORE
Depressed/Anxious
Other
Depressed/Irritable
Euthymic
Depressed/Other
Other
Depressed/Irritable
Depressed/Anxious
Depressed
Depressed/Anxious
Depressed/Other
Depressed/Anxious
Depressed/Other
Other
Other
Depressed/Irritable
Depressed/Other
Depressed/Anxious
Depressed/Other
Depressed/Other
Euthymic

## 2021-10-18 NOTE — BH INPATIENT PSYCHIATRY PROGRESS NOTE - NSBHFUPMEDSE_PSY_A_CORE
None known

## 2021-10-18 NOTE — BH INPATIENT PSYCHIATRY PROGRESS NOTE - NSBHMSERELATED_PSY_A_CORE
Fair
Good
Fair
Good
Fair
Fair
Good
Fair
Good
Good
Fair
Fair
Good
Fair
Fair
Good

## 2021-10-18 NOTE — BH INPATIENT PSYCHIATRY PROGRESS NOTE - NSBHMSEMOOD_PSY_A_CORE
Depressed/Anxious
Normal
Depressed
Normal
Depressed/Other
Depressed/Anxious
Depressed/Anxious
Depressed
Depressed/Other
Depressed/Other
Depressed/Anxious
Depressed
Depressed
Depressed/Other
Depressed/Anxious
Depressed
Depressed/Other
Depressed
Depressed/Other

## 2021-10-18 NOTE — BH INPATIENT PSYCHIATRY PROGRESS NOTE - NSTXDCOTHRDATEEST_PSY_ALL_CORE
30-Sep-2021
16-Sep-2021
16-Sep-2021
30-Sep-2021
16-Sep-2021
16-Sep-2021
30-Sep-2021
30-Sep-2021
17-Sep-2021
30-Sep-2021
17-Sep-2021
17-Sep-2021
16-Sep-2021
30-Sep-2021

## 2021-10-18 NOTE — BH INPATIENT PSYCHIATRY PROGRESS NOTE - NSTXDCOTHRINTERMD_PSY_ALL_CORE
Rx management
ECT and Rx management
ECT and Rx management
 Rx management
 Rx management
ECT and Rx management
 Rx management
ECT and Rx management
 Rx management
ECT and Rx management
ECT and Rx management
 Rx management
ECT and Rx management

## 2021-10-18 NOTE — BH INPATIENT PSYCHIATRY PROGRESS NOTE - NSTXDCOTHRDATETRGT_PSY_ALL_CORE
24-Sep-2021
18-Oct-2021
24-Sep-2021
08-Oct-2021
30-Sep-2021
30-Sep-2021
07-Oct-2021
08-Oct-2021
08-Oct-2021
24-Sep-2021
08-Oct-2021
30-Sep-2021
14-Oct-2021
30-Sep-2021
08-Oct-2021
24-Sep-2021
18-Oct-2021
18-Oct-2021

## 2021-10-18 NOTE — BH DISCHARGE NOTE NURSING/SOCIAL WORK/PSYCH REHAB - NSCDUDCCRISIS_PSY_A_CORE
[facility]  [phone] [facility]-WELL ([phone])  Text "WELL" to 44494  Website: www.[employer]/.[facility] 1 [phone] 311-HHNX (3784) Website: www.safehorizon.org/.National Suicide Prevention Lifeline 4 (242) 477-0279/.  [facility]  1 (033) L[facility] (513-2708)/.  [facility]  [phone] [place] [phone]   Hours:  Monday through Friday from 9 AM to 3 PM/.  [employer] - [facility] Crisis Line  [phone], Option 1

## 2021-10-18 NOTE — BH PSYCHOLOGY - GROUP THERAPY NOTE - NSBHPSYCHOLASSESSPROV_PSY_A_CORE
Trainee Only
Trainee Only
Licensed Staff Psychologist and Trainee
Trainee Only

## 2021-10-18 NOTE — BH INPATIENT PSYCHIATRY PROGRESS NOTE - NSBHCONSBHPROVDETAILS_PSY_A_CORE  FT
called Dr. Rucker  [phone] and was unable to reach him
called Dr. Rucker  [phone] and was unable to reach him
called Dr. Rucker  [phone] and was unable to reach him
called Dr. Rucker  [phone] and was unable to reach him.  Called cell phone at [phone] could not leave a message, as mailbox was full.  Tried again tor each  at above numbers and was unable to reach him.  Spoke with Dr. Rucker 10/12, and he reports he feels patient needs maintenance ECT, Discussed with him that patient had refused ECT and the ECT had been held due to cognitive issues. Discussed Rx options. 
called Dr. Rucker  [phone] and was unable to reach him
called Dr. Rucker  [phone] and was unable to reach him
called Dr. Rucker  [phone] and was unable to reach him
called Dr. Rucker  [phone] and was unable to reach him.  Called cell phone at [phone] could not leave a message, as mailbox was full.  Tried again tor each  at above numbers and was unable to reach him. 
called Dr. Rucker  [phone] and was unable to reach him.  Called cell phone at [phone] could not leave a message, as mailbox was full.  Tried again tor each  at above numbers and was unable to reach him.  Spoke with Dr. Rucker 10/12, and he reports he feels patient needs maintenance ECT, Discussed with him that patient had refused ECT and the ECT had been held due to cognitive issues. Discussed Rx options. 
called Dr. Rucker  [phone] and was unable to reach him
called Dr. Rucker  [phone] and was unable to reach him
called Dr. Rucker  [phone] and was unable to reach him
called Dr. Rucker  [phone] and was unable to reach him
called Dr. Rucker  [phone] and was unable to reach him.  Called cell phone at [phone] could not leave a message, as mailbox was full
called Dr. Rucker  [phone] and was unable to reach him.  Called cell phone at [phone] could not leave a message, as mailbox was full.  Tried again tor each  at above numbers and was unable to reach him.  Spoke with Dr. Rucker 10/12, and he reports he feels patient needs maintenance ECT, Discussed with him that patient had refused ECT and the ECT had been held due to cognitive issues. Discussed Rx options. 
called Dr. Rucker  [phone] and was unable to reach him.  Called cell phone at [phone] could not leave a message, as mailbox was full.  Tried again tor each  at above numbers and was unable to reach him.  Spoke with Dr. Rucker 10/12, and he reports he feels patient needs maintenance ECT, Discussed with him that patient had refused ECT and the ECT had been held due to cognitive issues. Discussed Rx options. 
called Dr. Rucker  [phone] and was unable to reach him.  Called cell phone at [phone] could not leave a message, as mailbox was full.  Tried again tor each  at above numbers and was unable to reach him.  Spoke with Dr. Rucker 10/12, and he reports he feels patient needs maintenance ECT, Discussed with him that patient had refused ECT and the ECT had been held due to cognitive issues. Discussed Rx options. 
called Dr. Rucker  [phone] and was unable to reach him.  Called cell phone at [phone] could not leave a message, as mailbox was full.  Tried again tor each  at above numbers and was unable to reach him. 
called Dr. Rucker  [phone] and was unable to reach him
called Dr. Rucker  [phone] and was unable to reach him
called Dr. Rucker  [phone] and was unable to reach him

## 2021-10-18 NOTE — BH INPATIENT PSYCHIATRY PROGRESS NOTE - NSBHCHARTREVIEWVS_PSY_A_CORE FT
Vital Signs Last 24 Hrs  T(C): 37 (10-18-21 @ 07:47), Max: 37 (10-18-21 @ 07:47)  T(F): 98.6 (10-18-21 @ 07:47), Max: 98.6 (10-18-21 @ 07:47)  HR: --  BP: --  BP(mean): --  RR: --  SpO2: --    Orthostatic VS  10-18-21 @ 07:47  Lying BP: --/-- HR: --  Sitting BP: 151/77 HR: 82  Standing BP: 140/90 HR: 90  Site: upper right arm  Mode: electronic  Orthostatic VS  10-17-21 @ 07:36  Lying BP: --/-- HR: --  Sitting BP: 135/61 HR: 82  Standing BP: 124/66 HR: 90  Site: --  Mode: --

## 2021-10-18 NOTE — BH DISCHARGE NOTE NURSING/SOCIAL WORK/PSYCH REHAB - NSDCPRGOAL_PSY_ALL_CORE
Writer met with patient to discuss patient’s discharge and progress towards rehabilitation goals. Patient was pleasant and cooperative to meet with writer. Patient has completed a safety plan prior to discharge. [facility] due to passive SI and depressive symptoms. During current hospitalization, patient has demonstrated fair improvements in symptoms. Patient reported she feels “much better” than when she was admitted. Patient demonstrated improvements in mood and affect. Patient was adherent to medication and received ECT, which she stated was helpful in managing her symptoms. Patient expressed some anxiety about discharge but stated that she felt she was ready to leave. Writer validated patient’s feeling and reminded her of her coping skills and support system. Patient denied suicidal/homicidal ideation. Patient denied auditory/visual hallucinations. Due to the COVID-19 pandemic, unit structure and activities are re-evaluated on a consistent basis in effort to maintain the safety of patients and staff. Patient attended some psychiatric rehabilitation groups and participated appropriately. Patient attended dance/movement therapy, music therapy, coping skills, peer advocate, symptom management, and leisure groups. Patient was increasingly visible on the unit and demonstrated good behavioral control. Patient was increasingly social with peers and cooperative with staff.

## 2021-10-18 NOTE — BH INPATIENT PSYCHIATRY PROGRESS NOTE - NSBHFUPINTERVALHXFT_PSY_A_CORE
Patient seen for follow up for bipolar depression, chart reviewed, case discussed in tx team meeting with interdisciplinary staff and with Dr. Schmidt All members of team agree patient is safe and appropriate for discharge.   Patient reports she is not depressed and reports she thinks ECT helped her but she does not want ECT anymore.   Denies any sx of nena.  She reports she thinks it makes her confused.  Patient reports occasional ST memory issues.  MOCA done, on 10/7 patient scored 25. ( indicative of mild cognitive impairment)  Patient has been attending groups on unit and is spending more time in the day area talking to select peers.  Patient reports sleeping and eating ok. Reports moving her bowels regularly, denies constipation.   Patient is Rx adherent and denies any SE and none are noted.  No sx of TD/EPS are noted.  Denies any HI or SI.   Reports will tell staff if she has thoughts of harming self or others.  Reports if she has SI at home, she will call one of her children, or her therapist or her psychiatrist or the Suicide Hotline or the  [facility]risis [facility] and was given phone numbers of the Suicide Hotline and the [facility].  Reports she feels hopeful about her future, and IDs her grandchildren as reasons to live.  VS reviewed. Patient is stable and is not a danger to self or others at this time. discharge to home today   Chart reviewed. Case discussed with interdisciplinary team. Patient seen and examined for follow up of rhonda. No acute overnight events. Compliant with meds yesterday AM. No PRNs required or requested. Per sleep log, fair sleep.     Patient seen this morning in day room by writer. Patient reports he is feeling fine. Describes rhonda as "protective." States that if civilization were to collapse, his rhonda would facilitate his survival. Says if there is nowhere to sleep in post-apocalyptic future, rhonda will be required to stay alive. Reports fatigue and upset stomach from Lithium. Says he is willing to take dose again tonight. Sleep and appetite in tact. No other physical complaints.    Chart reviewed. Case discussed with interdisciplinary team. Patient seen and examined for follow up of rhonda. No acute overnight events. Compliant with meds yesterday AM. No PRNs required or requested. Per sleep log, fair sleep.     Patient seen this morning in day room by writer. Patient reports he is feeling fine. Describes rhonda as "protective." States that if civilization were to collapse, his rhonda would facilitate his survival. Says if there is nowhere to sleep in post-apocalyptic future, rhonda will be required to stay alive. Reports fatigue and upset stomach ache from Lithium. Says he is willing to take dose again tonight. Sleep and appetite in tact. No other physical complaints.

## 2021-10-18 NOTE — BH INPATIENT PSYCHIATRY PROGRESS NOTE - NSBHPSYCHOLCOGORIENT_PSY_A_CORE
Oriented to time, place, person, situation
Not fully oriented...
Oriented to time, place, person, situation

## 2021-10-18 NOTE — BH INPATIENT PSYCHIATRY PROGRESS NOTE - NSBHMSETHTCONTENT_PSY_A_CORE
Unremarkable/Other
Unremarkable
Unremarkable/Other
Hopelessness/Suicidality
Hopelessness/Suicidality/Other
Hopelessness/Suicidality/Other
Unremarkable/Other
Hopelessness/Suicidality/Other
Unremarkable/Other
Unremarkable
Suicidality/Other
Unremarkable

## 2021-10-18 NOTE — BH INPATIENT PSYCHIATRY PROGRESS NOTE - NSTXDEPRESGOAL_PSY_ALL_CORE
Attend and participate in at least 2 groups daily despite low mood/energy
Attend and participate in at least 2 groups daily despite low mood/energy
Exhibit improvements in self-grooming, hygiene, sleep and appetite
Attend and participate in at least 2 groups daily despite low mood/energy
Exhibit improvements in self-grooming, hygiene, sleep and appetite
Exhibit improvements in self-grooming, hygiene, sleep and appetite
Attend and participate in at least 2 groups daily despite low mood/energy
Exhibit improvements in self-grooming, hygiene, sleep and appetite
Attend and participate in at least 2 groups daily despite low mood/energy
Attend and participate in at least 2 groups daily despite low mood/energy
Exhibit improvements in self-grooming, hygiene, sleep and appetite
Exhibit improvements in self-grooming, hygiene, sleep and appetite
Attend and participate in at least 2 groups daily despite low mood/energy
Exhibit improvements in self-grooming, hygiene, sleep and appetite

## 2021-10-18 NOTE — BH INPATIENT PSYCHIATRY PROGRESS NOTE - NSBHMSEGROOM_PSY_A_CORE
Fair
Good
Fair
Good
Fair
Good
Fair

## 2021-10-18 NOTE — BH PSYCHOLOGY - GROUP THERAPY NOTE - NSPSYCHOLGRPDBTEMOT_PSY_A_CORE FT
n/a
n/a
Patient attended the DBT Skills Acquisition group today, which takes place daily and is invite only. Today's topic was Emotion Regulation and focused on What Emotions Do For You and What Makes it Hard to Regulate Your Emotions. Group started with a brief mindfulness exercise and skills check-in. Group then reviewed handouts centered on the purpose of emotions to motivate action and communicate to ourselves and others manage emotions as well as on the ways in which biological factors, reinforcement, myths, emotional overload and lack of skills can influence one's ability to readily regulate emotions. Participants shared relevant examples from their own experiences. 
n/a
n/a

## 2021-10-18 NOTE — BH INPATIENT PSYCHIATRY PROGRESS NOTE - NSTXPROBECT_PSY_ALL_CORE
ECT, PATIENT RECEIVING

## 2021-10-18 NOTE — BH PSYCHOLOGY - GROUP THERAPY NOTE - NSBHPTASSESSDT_PSY_A_CORE
13-Oct-2021 12:20
18-Oct-2021 11:15
15-Oct-2021 01:15
11-Oct-2021 11:15
12-Oct-2021 11:15
14-Oct-2021 11:15

## 2021-10-18 NOTE — BH INPATIENT PSYCHIATRY PROGRESS NOTE - NSBHROSSYSTEMS_PSY_ALL_CORE
Cardiovascular.../Psychiatric
Psychiatric
Cardiovascular.../Psychiatric
Psychiatric

## 2021-10-18 NOTE — BH PSYCHOLOGY - GROUP THERAPY NOTE - NSPSYCHOLGRPDBTGOAL_PSY_A_CORE
reduce mood and affective lability/reduce interpersonal conflicts/improve ability to indentify feelings/improve ability to communicate feelings
reduce mood and affective lability/reduce impulsive self-defeating behavior
reduce mood and affective lability/reduce impulsive self-defeating behavior
reduce mood and affective lability/reduce suicidal ideation/risk of attempt
reduce mood and affective lability/improve ability to indentify feelings/reduce vulnerability to emotional dysregualation

## 2021-10-18 NOTE — BH INPATIENT PSYCHIATRY PROGRESS NOTE - NSTXDEPRESDATETRGT_PSY_ALL_CORE
08-Oct-2021
63.5
08-Oct-2021

## 2021-10-18 NOTE — BH INPATIENT PSYCHIATRY PROGRESS NOTE - NSTXPROBDEPRES_PSY_ALL_CORE
DEPRESSIVE SYMPTOMS

## 2021-10-18 NOTE — BH INPATIENT PSYCHIATRY PROGRESS NOTE - NSBHMSEEYE_PSY_A_CORE
Good
Good
Fair
Good
Fair
Good
Good
Fair
Good
Fair
Good
Fair
Good
Good
Fair
Good
Good
Fair
Good

## 2021-10-18 NOTE — BH INPATIENT PSYCHIATRY PROGRESS NOTE - NSBHMSEPERCEPT_PSY_A_CORE
No abnormalities

## 2021-10-18 NOTE — BH INPATIENT PSYCHIATRY PROGRESS NOTE - NSBHMSEHYG_PSY_A_CORE
Fair
Good
Fair
Good
Fair
Good

## 2021-10-18 NOTE — BH INPATIENT PSYCHIATRY PROGRESS NOTE - NSICDXBHPRIMARYDX_PSY_ALL_CORE
Depressed bipolar I disorder   F31.9  

## 2021-10-18 NOTE — BH INPATIENT PSYCHIATRY PROGRESS NOTE - NSTXSUICIDGOAL_PSY_ALL_CORE
Be able to state 3 reasons for living

## 2021-10-18 NOTE — BH INPATIENT PSYCHIATRY PROGRESS NOTE - NSTXSUICIDDATEEST_PSY_ALL_CORE
15-Sep-2021

## 2021-10-18 NOTE — BH PSYCHOLOGY - GROUP THERAPY NOTE - NSPSYCHOLGRPDBTPROB_PSY_A_CORE
anxiety/depressed mood/emotional dysregulation/impulsive behaviors
emotional dysregulation
anxiety/dependency/depressed mood/impulsive behaviors/suicidal ideation/other...
anxiety/depressed mood/emotional dysregulation/impulsive behaviors
anxiety/depressed mood/emotional dysregulation/impulsive behaviors

## 2021-10-18 NOTE — BH PSYCHOLOGY - GROUP THERAPY NOTE - NSBHPSYCHOLGRPTYPE_PSY_A_CORE
DBT Life Skills
DBT Life Skills
General Group Therapy
DBT Life Skills

## 2021-10-18 NOTE — BH INPATIENT PSYCHIATRY PROGRESS NOTE - NSBHINPTBILLING_PSY_ALL_CORE
33610 - Inpatient Moderate Complexity
87086 - Inpatient Moderate Complexity
76424 - Inpatient Moderate Complexity
30208 - Inpatient Moderate Complexity
49121 - Inpatient Moderate Complexity
64648 - Inpatient Moderate Complexity
20539 - Inpatient Moderate Complexity
21610 - Inpatient Moderate Complexity
55256 - Inpatient Low Complexity
92220 - Inpatient Moderate Complexity
88889 - Inpatient Moderate Complexity
09758 - Inpatient Moderate Complexity
18918 - Inpatient Moderate Complexity
80150 - Inpatient Moderate Complexity
82652 - Inpatient Low Complexity
14015 - Inpatient Moderate Complexity
90789 - Hospital Discharge Day Management; 30 min or less
73303 - Inpatient Moderate Complexity
54658 - Inpatient Moderate Complexity
72640 - Inpatient Moderate Complexity
69183 - Inpatient Moderate Complexity
87640 - Inpatient Moderate Complexity
11573 - Inpatient Moderate Complexity
85081 - Inpatient Moderate Complexity
09022 - Inpatient Moderate Complexity

## 2021-10-18 NOTE — [facility] INPATIENT PSYCHIATRY PROGRESS NOTE - CURRENT MEDICATION
MEDICATIONS  (STANDING):  amLODIPine   Tablet 10 milliGRAM(s) Oral daily  escitalopram 10 milliGRAM(s) Oral daily  famotidine    Tablet 20 milliGRAM(s) Oral two times a day  influenza  Vaccine (HIGH DOSE) 0.7 milliLiter(s) IntraMuscular once  lactobacillus acidophilus 1 Tablet(s) Oral daily  levothyroxine 75 MICROGram(s) Oral <User Schedule>  lidocaine   4% Patch 2 Patch Transdermal every 24 hours  nortriptyline 10 milliGRAM(s) Oral at bedtime  polyethylene glycol 3350 17 Gram(s) Oral daily  QUEtiapine 125 milliGRAM(s) Oral at bedtime  senna 2 Tablet(s) Oral at bedtime    MEDICATIONS  (PRN):  acetaminophen   Tablet .. 650 milliGRAM(s) Oral every 6 hours PRN Temp greater or equal to 38C (100.4F), Mild Pain (1 - 3), Moderate Pain (4 - 6)  haloperidol     Tablet 5 milliGRAM(s) Oral every 6 hours PRN agitation  haloperidol    Injectable 5 milliGRAM(s) IntraMuscular once PRN severe agitation  ibuprofen  Tablet. 600 milliGRAM(s) Oral every 6 hours PRN Mild Pain (1 - 3), Moderate Pain (4 - 6)  LORazepam     Tablet 1 milliGRAM(s) Oral every 6 hours PRN anxiety  LORazepam   Injectable 1 milliGRAM(s) IntraMuscular once PRN severe agitation  
MEDICATIONS  (STANDING):  amLODIPine   Tablet 10 milliGRAM(s) Oral daily  escitalopram 10 milliGRAM(s) Oral daily  famotidine    Tablet 20 milliGRAM(s) Oral two times a day  influenza  Vaccine (HIGH DOSE) 0.7 milliLiter(s) IntraMuscular once  lactobacillus acidophilus 1 Tablet(s) Oral daily  levothyroxine 75 MICROGram(s) Oral <User Schedule>  lidocaine   4% Patch 2 Patch Transdermal every 24 hours  nortriptyline 10 milliGRAM(s) Oral at bedtime  polyethylene glycol 3350 17 Gram(s) Oral daily  QUEtiapine 125 milliGRAM(s) Oral at bedtime  senna 2 Tablet(s) Oral at bedtime    MEDICATIONS  (PRN):  acetaminophen   Tablet .. 650 milliGRAM(s) Oral every 6 hours PRN Temp greater or equal to 38C (100.4F), Mild Pain (1 - 3), Moderate Pain (4 - 6)  haloperidol     Tablet 5 milliGRAM(s) Oral every 6 hours PRN agitation  haloperidol    Injectable 5 milliGRAM(s) IntraMuscular once PRN severe agitation  ibuprofen  Tablet. 600 milliGRAM(s) Oral every 6 hours PRN Mild Pain (1 - 3), Moderate Pain (4 - 6)  LORazepam     Tablet 1 milliGRAM(s) Oral every 6 hours PRN anxiety  LORazepam   Injectable 1 milliGRAM(s) IntraMuscular once PRN severe agitation  
MEDICATIONS  (STANDING):  amLODIPine   Tablet 10 milliGRAM(s) Oral daily  escitalopram 10 milliGRAM(s) Oral daily  famotidine    Tablet 20 milliGRAM(s) Oral two times a day  lactobacillus acidophilus 1 Tablet(s) Oral daily  levothyroxine 75 MICROGram(s) Oral <User Schedule>  lidocaine   4% Patch 2 Patch Transdermal every 24 hours  nortriptyline 10 milliGRAM(s) Oral at bedtime  polyethylene glycol 3350 17 Gram(s) Oral daily  QUEtiapine 125 milliGRAM(s) Oral at bedtime  senna 2 Tablet(s) Oral at bedtime    MEDICATIONS  (PRN):  acetaminophen   Tablet .. 650 milliGRAM(s) Oral every 6 hours PRN Temp greater or equal to 38C (100.4F), Mild Pain (1 - 3), Moderate Pain (4 - 6)  haloperidol     Tablet 5 milliGRAM(s) Oral every 6 hours PRN agitation  haloperidol    Injectable 5 milliGRAM(s) IntraMuscular once PRN severe agitation  ibuprofen  Tablet. 600 milliGRAM(s) Oral every 6 hours PRN Mild Pain (1 - 3), Moderate Pain (4 - 6)  LORazepam     Tablet 1 milliGRAM(s) Oral every 6 hours PRN anxiety  LORazepam   Injectable 1 milliGRAM(s) IntraMuscular once PRN severe agitation  
MEDICATIONS  (STANDING):  amLODIPine   Tablet 10 milliGRAM(s) Oral daily  famotidine    Tablet 20 milliGRAM(s) Oral two times a day  lactobacillus acidophilus 1 Tablet(s) Oral daily  levothyroxine 75 MICROGram(s) Oral <User Schedule>  lidocaine   4% Patch 2 Patch Transdermal every 24 hours  lisinopril 5 milliGRAM(s) Oral daily  polyethylene glycol 3350 17 Gram(s) Oral daily  QUEtiapine 125 milliGRAM(s) Oral at bedtime  senna 2 Tablet(s) Oral at bedtime  sertraline 50 milliGRAM(s) Oral daily    MEDICATIONS  (PRN):  acetaminophen   Tablet .. 650 milliGRAM(s) Oral every 6 hours PRN Temp greater or equal to 38C (100.4F), Mild Pain (1 - 3), Moderate Pain (4 - 6)  haloperidol     Tablet 5 milliGRAM(s) Oral every 6 hours PRN agitation  haloperidol    Injectable 5 milliGRAM(s) IntraMuscular once PRN severe agitation  ibuprofen  Tablet. 600 milliGRAM(s) Oral every 6 hours PRN Mild Pain (1 - 3), Moderate Pain (4 - 6)  LORazepam     Tablet 1 milliGRAM(s) Oral every 6 hours PRN anxiety  LORazepam   Injectable 1 milliGRAM(s) IntraMuscular once PRN severe agitation  
MEDICATIONS  (STANDING):  amLODIPine   Tablet 10 milliGRAM(s) Oral daily  escitalopram 10 milliGRAM(s) Oral daily  famotidine    Tablet 20 milliGRAM(s) Oral two times a day  lactobacillus acidophilus 1 Tablet(s) Oral daily  levothyroxine 75 MICROGram(s) Oral <User Schedule>  lidocaine   4% Patch 2 Patch Transdermal every 24 hours  nortriptyline 10 milliGRAM(s) Oral at bedtime  polyethylene glycol 3350 17 Gram(s) Oral daily  QUEtiapine 125 milliGRAM(s) Oral at bedtime  senna 2 Tablet(s) Oral at bedtime    MEDICATIONS  (PRN):  acetaminophen   Tablet .. 650 milliGRAM(s) Oral every 6 hours PRN Temp greater or equal to 38C (100.4F), Mild Pain (1 - 3), Moderate Pain (4 - 6)  haloperidol     Tablet 5 milliGRAM(s) Oral every 6 hours PRN agitation  haloperidol    Injectable 5 milliGRAM(s) IntraMuscular once PRN severe agitation  ibuprofen  Tablet. 600 milliGRAM(s) Oral every 6 hours PRN Mild Pain (1 - 3), Moderate Pain (4 - 6)  LORazepam     Tablet 1 milliGRAM(s) Oral every 6 hours PRN anxiety  LORazepam   Injectable 1 milliGRAM(s) IntraMuscular once PRN severe agitation  
MEDICATIONS  (STANDING):  amLODIPine   Tablet 10 milliGRAM(s) Oral daily  escitalopram 10 milliGRAM(s) Oral daily  famotidine    Tablet 20 milliGRAM(s) Oral two times a day  influenza  Vaccine (HIGH DOSE) 0.7 milliLiter(s) IntraMuscular once  lactobacillus acidophilus 1 Tablet(s) Oral daily  levothyroxine 75 MICROGram(s) Oral <User Schedule>  lidocaine   4% Patch 2 Patch Transdermal every 24 hours  nortriptyline 10 milliGRAM(s) Oral at bedtime  polyethylene glycol 3350 17 Gram(s) Oral daily  QUEtiapine 125 milliGRAM(s) Oral at bedtime  senna 2 Tablet(s) Oral at bedtime    MEDICATIONS  (PRN):  acetaminophen   Tablet .. 650 milliGRAM(s) Oral every 6 hours PRN Temp greater or equal to 38C (100.4F), Mild Pain (1 - 3), Moderate Pain (4 - 6)  haloperidol     Tablet 5 milliGRAM(s) Oral every 6 hours PRN agitation  haloperidol    Injectable 5 milliGRAM(s) IntraMuscular once PRN severe agitation  LORazepam     Tablet 1 milliGRAM(s) Oral every 6 hours PRN anxiety  LORazepam   Injectable 1 milliGRAM(s) IntraMuscular once PRN severe agitation  
MEDICATIONS  (STANDING):  amLODIPine   Tablet 10 milliGRAM(s) Oral daily  famotidine    Tablet 20 milliGRAM(s) Oral two times a day  lactobacillus acidophilus 1 Tablet(s) Oral daily  levothyroxine 75 MICROGram(s) Oral <User Schedule>  lidocaine   4% Patch 2 Patch Transdermal every 24 hours  polyethylene glycol 3350 17 Gram(s) Oral daily  QUEtiapine 125 milliGRAM(s) Oral at bedtime  senna 2 Tablet(s) Oral at bedtime  sertraline 50 milliGRAM(s) Oral daily    MEDICATIONS  (PRN):  acetaminophen   Tablet .. 650 milliGRAM(s) Oral every 6 hours PRN Temp greater or equal to 38C (100.4F), Mild Pain (1 - 3), Moderate Pain (4 - 6)  haloperidol     Tablet 5 milliGRAM(s) Oral every 6 hours PRN agitation  haloperidol    Injectable 5 milliGRAM(s) IntraMuscular once PRN severe agitation  ibuprofen  Tablet. 600 milliGRAM(s) Oral every 6 hours PRN Mild Pain (1 - 3), Moderate Pain (4 - 6)  LORazepam     Tablet 1 milliGRAM(s) Oral every 6 hours PRN anxiety  LORazepam   Injectable 1 milliGRAM(s) IntraMuscular once PRN severe agitation  
MEDICATIONS  (STANDING):  amLODIPine   Tablet 10 milliGRAM(s) Oral daily  escitalopram 10 milliGRAM(s) Oral daily  famotidine    Tablet 20 milliGRAM(s) Oral two times a day  influenza  Vaccine (HIGH DOSE) 0.7 milliLiter(s) IntraMuscular once  lactobacillus acidophilus 1 Tablet(s) Oral daily  levothyroxine 75 MICROGram(s) Oral <User Schedule>  nortriptyline 10 milliGRAM(s) Oral at bedtime  QUEtiapine 125 milliGRAM(s) Oral at bedtime    MEDICATIONS  (PRN):  haloperidol     Tablet 5 milliGRAM(s) Oral every 6 hours PRN agitation  haloperidol    Injectable 5 milliGRAM(s) IntraMuscular once PRN severe agitation  LORazepam     Tablet 1 milliGRAM(s) Oral every 4 hours PRN anxiety  LORazepam   Injectable 2 milliGRAM(s) IntraMuscular once PRN severe agitation  
MEDICATIONS  (STANDING):  amLODIPine   Tablet 10 milliGRAM(s) Oral daily  escitalopram 10 milliGRAM(s) Oral daily  famotidine    Tablet 20 milliGRAM(s) Oral two times a day  influenza  Vaccine (HIGH DOSE) 0.7 milliLiter(s) IntraMuscular once  lactobacillus acidophilus 1 Tablet(s) Oral daily  levothyroxine 75 MICROGram(s) Oral <User Schedule>  lidocaine   4% Patch 2 Patch Transdermal every 24 hours  nortriptyline 10 milliGRAM(s) Oral at bedtime  polyethylene glycol 3350 17 Gram(s) Oral daily  QUEtiapine 125 milliGRAM(s) Oral at bedtime    MEDICATIONS  (PRN):  acetaminophen   Tablet .. 650 milliGRAM(s) Oral every 6 hours PRN Temp greater or equal to 38C (100.4F), Mild Pain (1 - 3), Moderate Pain (4 - 6)  haloperidol     Tablet 5 milliGRAM(s) Oral every 6 hours PRN agitation  haloperidol    Injectable 5 milliGRAM(s) IntraMuscular once PRN severe agitation  LORazepam     Tablet 1 milliGRAM(s) Oral every 6 hours PRN anxiety  LORazepam   Injectable 1 milliGRAM(s) IntraMuscular once PRN severe agitation  
MEDICATIONS  (STANDING):  amLODIPine   Tablet 10 milliGRAM(s) Oral daily  famotidine    Tablet 20 milliGRAM(s) Oral two times a day  lactobacillus acidophilus 1 Tablet(s) Oral daily  levothyroxine 75 MICROGram(s) Oral <User Schedule>  lidocaine   4% Patch 2 Patch Transdermal every 24 hours  polyethylene glycol 3350 17 Gram(s) Oral daily  QUEtiapine 125 milliGRAM(s) Oral at bedtime  senna 2 Tablet(s) Oral at bedtime  sertraline 25 milliGRAM(s) Oral daily    MEDICATIONS  (PRN):  acetaminophen   Tablet .. 650 milliGRAM(s) Oral every 6 hours PRN Temp greater or equal to 38C (100.4F), Mild Pain (1 - 3), Moderate Pain (4 - 6)  haloperidol     Tablet 5 milliGRAM(s) Oral every 6 hours PRN agitation  haloperidol    Injectable 5 milliGRAM(s) IntraMuscular once PRN severe agitation  ibuprofen  Tablet. 600 milliGRAM(s) Oral every 6 hours PRN Mild Pain (1 - 3), Moderate Pain (4 - 6)  LORazepam     Tablet 1 milliGRAM(s) Oral every 6 hours PRN anxiety  LORazepam   Injectable 1 milliGRAM(s) IntraMuscular once PRN severe agitation  
MEDICATIONS  (STANDING):  amLODIPine   Tablet 10 milliGRAM(s) Oral daily  carBAMazepine 100 milliGRAM(s) Oral daily  carBAMazepine 100 milliGRAM(s) Oral at bedtime  escitalopram 10 milliGRAM(s) Oral daily  famotidine    Tablet 20 milliGRAM(s) Oral two times a day  influenza  Vaccine (HIGH DOSE) 0.7 milliLiter(s) IntraMuscular once  levothyroxine 75 MICROGram(s) Oral daily  nortriptyline 10 milliGRAM(s) Oral at bedtime  QUEtiapine 125 milliGRAM(s) Oral at bedtime    MEDICATIONS  (PRN):  haloperidol     Tablet 5 milliGRAM(s) Oral every 6 hours PRN agitation  haloperidol    Injectable 5 milliGRAM(s) IntraMuscular once PRN severe agitation  LORazepam     Tablet 1 milliGRAM(s) Oral every 4 hours PRN anxiety  LORazepam   Injectable 2 milliGRAM(s) IntraMuscular once PRN severe agitation  
MEDICATIONS  (STANDING):  amLODIPine   Tablet 10 milliGRAM(s) Oral daily  escitalopram 10 milliGRAM(s) Oral daily  famotidine    Tablet 20 milliGRAM(s) Oral two times a day  influenza  Vaccine (HIGH DOSE) 0.7 milliLiter(s) IntraMuscular once  lactobacillus acidophilus 1 Tablet(s) Oral daily  levothyroxine 75 MICROGram(s) Oral <User Schedule>  nortriptyline 10 milliGRAM(s) Oral at bedtime  polyethylene glycol 3350 17 Gram(s) Oral daily  QUEtiapine 125 milliGRAM(s) Oral at bedtime    MEDICATIONS  (PRN):  acetaminophen   Tablet .. 325 milliGRAM(s) Oral every 4 hours PRN Moderate Pain (4 - 6)  haloperidol     Tablet 5 milliGRAM(s) Oral every 6 hours PRN agitation  haloperidol    Injectable 5 milliGRAM(s) IntraMuscular once PRN severe agitation  LORazepam     Tablet 1 milliGRAM(s) Oral every 6 hours PRN anxiety  LORazepam   Injectable 1 milliGRAM(s) IntraMuscular once PRN severe agitation  
MEDICATIONS  (STANDING):  amLODIPine   Tablet 10 milliGRAM(s) Oral daily  carBAMazepine 100 milliGRAM(s) Oral daily  escitalopram 10 milliGRAM(s) Oral daily  famotidine    Tablet 20 milliGRAM(s) Oral two times a day  influenza  Vaccine (HIGH DOSE) 0.7 milliLiter(s) IntraMuscular once  levothyroxine 75 MICROGram(s) Oral <User Schedule>  nortriptyline 10 milliGRAM(s) Oral at bedtime  QUEtiapine 125 milliGRAM(s) Oral at bedtime    MEDICATIONS  (PRN):  haloperidol     Tablet 5 milliGRAM(s) Oral every 6 hours PRN agitation  haloperidol    Injectable 5 milliGRAM(s) IntraMuscular once PRN severe agitation  LORazepam     Tablet 1 milliGRAM(s) Oral every 4 hours PRN anxiety  LORazepam   Injectable 2 milliGRAM(s) IntraMuscular once PRN severe agitation  
MEDICATIONS  (STANDING):  amLODIPine   Tablet 10 milliGRAM(s) Oral daily  escitalopram 10 milliGRAM(s) Oral daily  famotidine    Tablet 20 milliGRAM(s) Oral two times a day  influenza  Vaccine (HIGH DOSE) 0.7 milliLiter(s) IntraMuscular once  lactobacillus acidophilus 1 Tablet(s) Oral daily  levothyroxine 75 MICROGram(s) Oral <User Schedule>  lidocaine   4% Patch 2 Patch Transdermal every 24 hours  nortriptyline 10 milliGRAM(s) Oral at bedtime  polyethylene glycol 3350 17 Gram(s) Oral daily  QUEtiapine 125 milliGRAM(s) Oral at bedtime  senna 2 Tablet(s) Oral at bedtime    MEDICATIONS  (PRN):  acetaminophen   Tablet .. 650 milliGRAM(s) Oral every 6 hours PRN Temp greater or equal to 38C (100.4F), Mild Pain (1 - 3), Moderate Pain (4 - 6)  haloperidol     Tablet 5 milliGRAM(s) Oral every 6 hours PRN agitation  haloperidol    Injectable 5 milliGRAM(s) IntraMuscular once PRN severe agitation  ibuprofen  Tablet. 600 milliGRAM(s) Oral every 6 hours PRN Mild Pain (1 - 3), Moderate Pain (4 - 6)  LORazepam     Tablet 1 milliGRAM(s) Oral every 6 hours PRN anxiety  LORazepam   Injectable 1 milliGRAM(s) IntraMuscular once PRN severe agitation  
MEDICATIONS  (STANDING):  amLODIPine   Tablet 10 milliGRAM(s) Oral daily  famotidine    Tablet 20 milliGRAM(s) Oral two times a day  lactobacillus acidophilus 1 Tablet(s) Oral daily  levothyroxine 75 MICROGram(s) Oral <User Schedule>  lidocaine   4% Patch 2 Patch Transdermal every 24 hours  lisinopril 5 milliGRAM(s) Oral daily  polyethylene glycol 3350 17 Gram(s) Oral daily  QUEtiapine 150 milliGRAM(s) Oral at bedtime  senna 2 Tablet(s) Oral at bedtime  sertraline 50 milliGRAM(s) Oral daily    MEDICATIONS  (PRN):  acetaminophen   Tablet .. 650 milliGRAM(s) Oral every 6 hours PRN Temp greater or equal to 38C (100.4F), Mild Pain (1 - 3), Moderate Pain (4 - 6)  haloperidol     Tablet 5 milliGRAM(s) Oral every 6 hours PRN agitation  haloperidol    Injectable 5 milliGRAM(s) IntraMuscular once PRN severe agitation  ibuprofen  Tablet. 600 milliGRAM(s) Oral every 6 hours PRN Mild Pain (1 - 3), Moderate Pain (4 - 6)  LORazepam     Tablet 1 milliGRAM(s) Oral every 6 hours PRN anxiety  LORazepam   Injectable 1 milliGRAM(s) IntraMuscular once PRN severe agitation  
MEDICATIONS  (STANDING):  amLODIPine   Tablet 10 milliGRAM(s) Oral daily  carBAMazepine 100 milliGRAM(s) Oral at bedtime  carBAMazepine 100 milliGRAM(s) Oral daily  escitalopram 10 milliGRAM(s) Oral daily  famotidine    Tablet 20 milliGRAM(s) Oral two times a day  influenza  Vaccine (HIGH DOSE) 0.7 milliLiter(s) IntraMuscular once  levothyroxine 75 MICROGram(s) Oral <User Schedule>  nortriptyline 10 milliGRAM(s) Oral at bedtime  QUEtiapine 125 milliGRAM(s) Oral at bedtime    MEDICATIONS  (PRN):  haloperidol     Tablet 5 milliGRAM(s) Oral every 6 hours PRN agitation  haloperidol    Injectable 5 milliGRAM(s) IntraMuscular once PRN severe agitation  LORazepam     Tablet 1 milliGRAM(s) Oral every 4 hours PRN anxiety  LORazepam   Injectable 2 milliGRAM(s) IntraMuscular once PRN severe agitation  
MEDICATIONS  (STANDING):  amLODIPine   Tablet 10 milliGRAM(s) Oral daily  escitalopram 10 milliGRAM(s) Oral daily  famotidine    Tablet 20 milliGRAM(s) Oral two times a day  lactobacillus acidophilus 1 Tablet(s) Oral daily  levothyroxine 75 MICROGram(s) Oral <User Schedule>  lidocaine   4% Patch 2 Patch Transdermal every 24 hours  nortriptyline 10 milliGRAM(s) Oral at bedtime  polyethylene glycol 3350 17 Gram(s) Oral daily  QUEtiapine 125 milliGRAM(s) Oral at bedtime  senna 2 Tablet(s) Oral at bedtime    MEDICATIONS  (PRN):  acetaminophen   Tablet .. 650 milliGRAM(s) Oral every 6 hours PRN Temp greater or equal to 38C (100.4F), Mild Pain (1 - 3), Moderate Pain (4 - 6)  haloperidol     Tablet 5 milliGRAM(s) Oral every 6 hours PRN agitation  haloperidol    Injectable 5 milliGRAM(s) IntraMuscular once PRN severe agitation  ibuprofen  Tablet. 600 milliGRAM(s) Oral every 6 hours PRN Mild Pain (1 - 3), Moderate Pain (4 - 6)  LORazepam     Tablet 1 milliGRAM(s) Oral every 6 hours PRN anxiety  LORazepam   Injectable 1 milliGRAM(s) IntraMuscular once PRN severe agitation  
MEDICATIONS  (STANDING):  amLODIPine   Tablet 10 milliGRAM(s) Oral daily  famotidine    Tablet 20 milliGRAM(s) Oral two times a day  lactobacillus acidophilus 1 Tablet(s) Oral daily  levothyroxine 75 MICROGram(s) Oral <User Schedule>  lidocaine   4% Patch 2 Patch Transdermal every 24 hours  polyethylene glycol 3350 17 Gram(s) Oral daily  QUEtiapine 125 milliGRAM(s) Oral at bedtime  senna 2 Tablet(s) Oral at bedtime  sertraline 25 milliGRAM(s) Oral daily    MEDICATIONS  (PRN):  acetaminophen   Tablet .. 650 milliGRAM(s) Oral every 6 hours PRN Temp greater or equal to 38C (100.4F), Mild Pain (1 - 3), Moderate Pain (4 - 6)  haloperidol     Tablet 5 milliGRAM(s) Oral every 6 hours PRN agitation  haloperidol    Injectable 5 milliGRAM(s) IntraMuscular once PRN severe agitation  ibuprofen  Tablet. 600 milliGRAM(s) Oral every 6 hours PRN Mild Pain (1 - 3), Moderate Pain (4 - 6)  LORazepam     Tablet 1 milliGRAM(s) Oral every 6 hours PRN anxiety  LORazepam   Injectable 1 milliGRAM(s) IntraMuscular once PRN severe agitation  
MEDICATIONS  (STANDING):  amLODIPine   Tablet 10 milliGRAM(s) Oral daily  escitalopram 10 milliGRAM(s) Oral daily  famotidine    Tablet 20 milliGRAM(s) Oral two times a day  influenza  Vaccine (HIGH DOSE) 0.7 milliLiter(s) IntraMuscular once  lactobacillus acidophilus 1 Tablet(s) Oral daily  levothyroxine 75 MICROGram(s) Oral <User Schedule>  nortriptyline 10 milliGRAM(s) Oral at bedtime  QUEtiapine 125 milliGRAM(s) Oral at bedtime    MEDICATIONS  (PRN):  haloperidol     Tablet 5 milliGRAM(s) Oral every 6 hours PRN agitation  haloperidol    Injectable 5 milliGRAM(s) IntraMuscular once PRN severe agitation  LORazepam     Tablet 1 milliGRAM(s) Oral every 4 hours PRN anxiety  LORazepam   Injectable 2 milliGRAM(s) IntraMuscular once PRN severe agitation  
MEDICATIONS  (STANDING):  amLODIPine   Tablet 10 milliGRAM(s) Oral daily  famotidine    Tablet 20 milliGRAM(s) Oral two times a day  lactobacillus acidophilus 1 Tablet(s) Oral daily  levothyroxine 75 MICROGram(s) Oral <User Schedule>  lidocaine   4% Patch 2 Patch Transdermal every 24 hours  lisinopril 5 milliGRAM(s) Oral daily  polyethylene glycol 3350 17 Gram(s) Oral daily  QUEtiapine 150 milliGRAM(s) Oral at bedtime  senna 2 Tablet(s) Oral at bedtime  sertraline 50 milliGRAM(s) Oral daily    MEDICATIONS  (PRN):  acetaminophen   Tablet .. 650 milliGRAM(s) Oral every 6 hours PRN Temp greater or equal to 38C (100.4F), Mild Pain (1 - 3), Moderate Pain (4 - 6)  haloperidol     Tablet 5 milliGRAM(s) Oral every 6 hours PRN agitation  haloperidol    Injectable 5 milliGRAM(s) IntraMuscular once PRN severe agitation  ibuprofen  Tablet. 600 milliGRAM(s) Oral every 6 hours PRN Mild Pain (1 - 3), Moderate Pain (4 - 6)  LORazepam     Tablet 1 milliGRAM(s) Oral every 6 hours PRN anxiety  LORazepam   Injectable 1 milliGRAM(s) IntraMuscular once PRN severe agitation  
MEDICATIONS  (STANDING):  amLODIPine   Tablet 10 milliGRAM(s) Oral daily  escitalopram 10 milliGRAM(s) Oral daily  famotidine    Tablet 20 milliGRAM(s) Oral two times a day  influenza  Vaccine (HIGH DOSE) 0.7 milliLiter(s) IntraMuscular once  lactobacillus acidophilus 1 Tablet(s) Oral daily  levothyroxine 75 MICROGram(s) Oral <User Schedule>  lidocaine   4% Patch 2 Patch Transdermal every 24 hours  nortriptyline 10 milliGRAM(s) Oral at bedtime  polyethylene glycol 3350 17 Gram(s) Oral daily  QUEtiapine 125 milliGRAM(s) Oral at bedtime  senna 2 Tablet(s) Oral at bedtime    MEDICATIONS  (PRN):  acetaminophen   Tablet .. 650 milliGRAM(s) Oral every 6 hours PRN Temp greater or equal to 38C (100.4F), Mild Pain (1 - 3), Moderate Pain (4 - 6)  haloperidol     Tablet 5 milliGRAM(s) Oral every 6 hours PRN agitation  haloperidol    Injectable 5 milliGRAM(s) IntraMuscular once PRN severe agitation  ibuprofen  Tablet. 600 milliGRAM(s) Oral every 6 hours PRN Mild Pain (1 - 3), Moderate Pain (4 - 6)  LORazepam     Tablet 1 milliGRAM(s) Oral every 6 hours PRN anxiety  LORazepam   Injectable 1 milliGRAM(s) IntraMuscular once PRN severe agitation  
MEDICATIONS  (STANDING):  amLODIPine   Tablet 10 milliGRAM(s) Oral daily  famotidine    Tablet 20 milliGRAM(s) Oral two times a day  lactobacillus acidophilus 1 Tablet(s) Oral daily  levothyroxine 75 MICROGram(s) Oral <User Schedule>  lidocaine   4% Patch 2 Patch Transdermal every 24 hours  polyethylene glycol 3350 17 Gram(s) Oral daily  QUEtiapine 125 milliGRAM(s) Oral at bedtime  senna 2 Tablet(s) Oral at bedtime  sertraline 50 milliGRAM(s) Oral daily    MEDICATIONS  (PRN):  acetaminophen   Tablet .. 650 milliGRAM(s) Oral every 6 hours PRN Temp greater or equal to 38C (100.4F), Mild Pain (1 - 3), Moderate Pain (4 - 6)  haloperidol     Tablet 5 milliGRAM(s) Oral every 6 hours PRN agitation  haloperidol    Injectable 5 milliGRAM(s) IntraMuscular once PRN severe agitation  ibuprofen  Tablet. 600 milliGRAM(s) Oral every 6 hours PRN Mild Pain (1 - 3), Moderate Pain (4 - 6)  LORazepam     Tablet 1 milliGRAM(s) Oral every 6 hours PRN anxiety  LORazepam   Injectable 1 milliGRAM(s) IntraMuscular once PRN severe agitation  
MEDICATIONS  (STANDING):  amLODIPine   Tablet 10 milliGRAM(s) Oral daily  escitalopram 10 milliGRAM(s) Oral daily  famotidine    Tablet 20 milliGRAM(s) Oral two times a day  influenza  Vaccine (HIGH DOSE) 0.7 milliLiter(s) IntraMuscular once  lactobacillus acidophilus 1 Tablet(s) Oral daily  levothyroxine 75 MICROGram(s) Oral <User Schedule>  nortriptyline 10 milliGRAM(s) Oral at bedtime  polyethylene glycol 3350 17 Gram(s) Oral daily  QUEtiapine 125 milliGRAM(s) Oral at bedtime    MEDICATIONS  (PRN):  haloperidol     Tablet 5 milliGRAM(s) Oral every 6 hours PRN agitation  haloperidol    Injectable 5 milliGRAM(s) IntraMuscular once PRN severe agitation  LORazepam     Tablet 1 milliGRAM(s) Oral every 6 hours PRN anxiety  LORazepam   Injectable 1 milliGRAM(s) IntraMuscular once PRN severe agitation  
MEDICATIONS  (STANDING):  amLODIPine   Tablet 10 milliGRAM(s) Oral daily  escitalopram 10 milliGRAM(s) Oral daily  famotidine    Tablet 20 milliGRAM(s) Oral two times a day  influenza  Vaccine (HIGH DOSE) 0.7 milliLiter(s) IntraMuscular once  lactobacillus acidophilus 1 Tablet(s) Oral daily  levothyroxine 75 MICROGram(s) Oral <User Schedule>  lidocaine   4% Patch 2 Patch Transdermal every 24 hours  nortriptyline 10 milliGRAM(s) Oral at bedtime  polyethylene glycol 3350 17 Gram(s) Oral daily  QUEtiapine 125 milliGRAM(s) Oral at bedtime  senna 2 Tablet(s) Oral at bedtime    MEDICATIONS  (PRN):  acetaminophen   Tablet .. 650 milliGRAM(s) Oral every 6 hours PRN Temp greater or equal to 38C (100.4F), Mild Pain (1 - 3), Moderate Pain (4 - 6)  haloperidol     Tablet 5 milliGRAM(s) Oral every 6 hours PRN agitation  haloperidol    Injectable 5 milliGRAM(s) IntraMuscular once PRN severe agitation  ibuprofen  Tablet. 600 milliGRAM(s) Oral every 6 hours PRN Mild Pain (1 - 3), Moderate Pain (4 - 6)  LORazepam     Tablet 1 milliGRAM(s) Oral every 6 hours PRN anxiety  LORazepam   Injectable 1 milliGRAM(s) IntraMuscular once PRN severe agitation  
MEDICATIONS  (STANDING):  amLODIPine   Tablet 10 milliGRAM(s) Oral daily  famotidine    Tablet 20 milliGRAM(s) Oral two times a day  lactobacillus acidophilus 1 Tablet(s) Oral daily  levothyroxine 75 MICROGram(s) Oral <User Schedule>  lidocaine   4% Patch 2 Patch Transdermal every 24 hours  lisinopril 5 milliGRAM(s) Oral daily  polyethylene glycol 3350 17 Gram(s) Oral daily  QUEtiapine 125 milliGRAM(s) Oral at bedtime  senna 2 Tablet(s) Oral at bedtime  sertraline 50 milliGRAM(s) Oral daily    MEDICATIONS  (PRN):  acetaminophen   Tablet .. 650 milliGRAM(s) Oral every 6 hours PRN Temp greater or equal to 38C (100.4F), Mild Pain (1 - 3), Moderate Pain (4 - 6)  haloperidol     Tablet 5 milliGRAM(s) Oral every 6 hours PRN agitation  haloperidol    Injectable 5 milliGRAM(s) IntraMuscular once PRN severe agitation  ibuprofen  Tablet. 600 milliGRAM(s) Oral every 6 hours PRN Mild Pain (1 - 3), Moderate Pain (4 - 6)  LORazepam     Tablet 1 milliGRAM(s) Oral every 6 hours PRN anxiety  LORazepam   Injectable 1 milliGRAM(s) IntraMuscular once PRN severe agitation

## 2021-10-18 NOTE — BH DISCHARGE NOTE NURSING/SOCIAL WORK/PSYCH REHAB - NSDCPRRECOMMEND_PSY_ALL_CORE
Psychiatric Rehabilitation staff recommends that patient follow up with outpatient care for ongoing medication management, support, and psychotherapy. In addition, psych rehab recommends patient explore and utilize coping skills for improved symptom management and sustained recovery.

## 2021-10-18 NOTE — BH INPATIENT PSYCHIATRY PROGRESS NOTE - NSBHCONSULTIPREASON_PSY_A_CORE
danger to self; mental illness expected to respond to inpatient care

## 2021-10-18 NOTE — BH INPATIENT PSYCHIATRY PROGRESS NOTE - NSTXCOPEGOAL_PSY_ALL_CORE
Identify and utilize 2 coping skills that meet their needs

## 2021-10-18 NOTE — BH INPATIENT PSYCHIATRY PROGRESS NOTE - NSBHMSEAFFRANGE_PSY_A_CORE
Full/Constricted
Constricted
Constricted/Other
Constricted
Constricted/Other
Constricted
Full
Constricted
Full/Constricted
Constricted
Constricted/Other
Constricted
Full/Constricted

## 2021-10-18 NOTE — BH DISCHARGE NOTE NURSING/SOCIAL WORK/PSYCH REHAB - NSDCINSTRUCTIONS_PSY_ALL_CORE_FT
When discharged, take only medications prescribed as instructed by your hospital provider    Do not stop or change any medications until you discuss changes with your own prescriber    Do not take any other medications, including left over medications from before your admission, over the counter medications or herbal supplements, unless you discuss with your own provider No

## 2021-10-18 NOTE — BH INPATIENT PSYCHIATRY PROGRESS NOTE - NSBHMSEREMMEM_PSY_A_CORE
Normal

## 2021-10-18 NOTE — BH INPATIENT PSYCHIATRY PROGRESS NOTE - NSTXCOPEDATEEST_PSY_ALL_CORE
23-Sep-2021
16-Sep-2021
16-Sep-2021
15-Sep-2021
16-Sep-2021
30-Sep-2021
23-Sep-2021
16-Sep-2021
30-Sep-2021
16-Sep-2021
23-Sep-2021
23-Sep-2021
16-Sep-2021
23-Sep-2021
16-Sep-2021
16-Sep-2021
30-Sep-2021
16-Sep-2021

## 2021-10-18 NOTE — BH INPATIENT PSYCHIATRY PROGRESS NOTE - NSTXPROBSUICID_PSY_ALL_CORE
SUICIDE/SELF-INJURIOUS BEHAVIOR

## 2021-10-18 NOTE — BH INPATIENT PSYCHIATRY PROGRESS NOTE - NSBHMSEINTELL_PSY_A_CORE
Average

## 2021-10-18 NOTE — BH INPATIENT PSYCHIATRY PROGRESS NOTE - NSTXECTDATETRGT_PSY_ALL_CORE
08-Oct-2021

## 2021-10-18 NOTE — BH INPATIENT PSYCHIATRY PROGRESS NOTE - NSTXDEPRESPROGRES_PSY_ALL_CORE
Met - goal discontinued
No Change
Improving
No Change
Improving
No Change
Improving
No Change
No Change
Improving
Met - goal discontinued
No Change
Improving
Improving
No Change
Improving
No Change

## 2021-10-18 NOTE — BH INPATIENT PSYCHIATRY PROGRESS NOTE - NSTXDEPRESINTERMD_PSY_ALL_CORE
ECT and medication management
 medication management   DC Lexapro and Pamelor   begin Zoloft
ECT and medication management
 medication management   DC Lexapro and Pamelor   begin Zoloft
 medication management   DC Lexapro and Pamelor   begin Zoloft
ECT and medication management
 medication management   DC Lexapro and Pamelor   begin Zoloft
ECT and medication management
 medication management   DC Lexapro and Pamelor   begin Zoloft
 medication management   DC Lexapro and Pamelor   begin Zoloft
ECT and medication management

## 2021-10-18 NOTE — BH INPATIENT PSYCHIATRY PROGRESS NOTE - NSTXPROBCOPE_PSY_ALL_CORE
COPING, INEFFECTIVE

## 2021-10-18 NOTE — BH PSYCHOLOGY - GROUP THERAPY NOTE - NSBHPSYCHOLPARTICIPCOMMENT_PSY_A_CORE FT
The pt arrived to group on time and was engaged and cooperative. The pt introduced herself to the group and in response to whether she used any coping skills over the past days, the pt said that she used paced breathing. She was attentive and engaged throughout the mindfulness exercise and during the distress tolerance module. In the skills portion of the group, the pt spontaneously talked a few times, read from the handout, and responded when prompted. She talked about how she feels distress and anxiety in her body. She also talked about how dancing has been helpful in making her feel better. The pt was engaged during the paired muscle relaxation exercise. 
Pt was attentive, engaged and cooperative throughout group. She was engaged throughout group, participated in the breathing exercises. and shared relevant personal experiences freely. 
The pt arrived to group on time and was engaged and cooperative. The pt introduced herself to the group and in response to whether she used any coping skills over the past days, the pt said that she has not been able to use any coping skills. During the mindfulness exercise, the pt appeared inattentive at times. After the mindfulness exercise, the pt admitted that it was difficult for her to remain in the present moment. The writer validated the pt, and reminded the pt that mindfulness requires practice. The pt was attentive and engaged during the distress tolerance module. In the skills portion of the group, the pt spontaneously talked a couple of times, read from the handout, and responded when prompted. She talked about how she recognizes now that she uses “going to bed” as a distraction method to survive problems and stressful situations, but acknowledged that it was problematic that she then does not go back to resolve the issue.  
Pt. arrived on time to DBT skills group and fully participated in skills check in and mindfulness exercise. Pt. required redirection from speaking out during the mindfulness exercise but was receptive to redirection. Pt. shared that she is not sure what coping skills she has used in the past but recognized she would like to learn to cope with symptoms. Pt. spontaneously participated in discussion and provided relative personal details, including details about the ways in which she notices her emotional intensity increases or decreases around select family members. Pt. was receptive to supportive feedback.
The pt arrived to group on time and was engaged and cooperative. The pt introduced herself to the group and in response to whether she used any coping skills over the past days, the pt said that she's been struggling to use coping skills, but will sometimes use imagery as a coping strategy. She was attentive and engaged throughout the mindfulness exercise and during the distress tolerance module. In the skills portion of the group, the pt spontaneously talked a few times. The pt talked about how she’s been working to make meaning of her stay at the hospital. She said that she hopes there’s meaning to her stay even if she can’t see it now. 
Pt. arrived on time to DBT skills group and fully participated in skills check in and mindfulness exercise. Pt. required redirection from speaking out during the mindfulness exercise again today but was receptive to redirection. Pt. shared that she does not often engage in coping skills that are helpful for her depression and instead utilizes sleep as a coping skill. Writer briefly provided psychoeducation regarding the effects of staying in bed on depressive symptoms, and pt. was receptive to this education. Pt. spontaneously participated in discussion and provided relative personal details, including the ways in which dialectical thinking can be helpful in her relationship with one of her children as well as the ways in which thinking dialectically can assist her in reminding herself that as change is a constant, her symptoms may not always stay the same and may improve. Pt. was receptive to supportive feedback.

## 2021-10-18 NOTE — BH INPATIENT PSYCHIATRY PROGRESS NOTE - NSTXECTINTERMD_PSY_ALL_CORE
Taper off Tegretol  completed.    Patient teaching done re: need to maintain NPO from midnight before ECT until after ECT the next day with teach back and compliance verbalized
Taper off Tegretol 
Taper off Tegretol  completed.    Patient teaching done re: need to maintain NPO from midnight before ECT until after ECT the next day with teach back and compliance verbalized
Taper off Tegretol  completed.    Patient teaching done re: need to maintain NPO from midnight before ECT until after ECT the next day with teach back and compliance verbalized
Taper off Tegretol 
Taper off Tegretol  completed.    Patient teaching done re: need to maintain NPO from midnight before ECT until after ECT the next day with teach back and compliance verbalized
Taper off Tegretol  completed.    Patient teaching done re: need to maintain NPO from midnight before ECT until after ECT the next day with teach back and compliance verbalized    patient declines ECT
Taper off Tegretol  completed.    Patient teaching done re: need to maintain NPO from midnight before ECT until after ECT the next day with teach back and compliance verbalized
Taper off Tegretol  completed.    Patient teaching done re: need to maintain NPO from midnight before ECT until after ECT the next day with teach back and compliance verbalized
Taper off Tegretol  completed.    Patient teaching done re: need to maintain NPO from midnight before ECT until after ECT the next day with teach back and compliance verbalized    patient declines ECT
Taper off Tegretol  completed.    Patient teaching done re: need to maintain NPO from midnight before ECT until after ECT the next day with teach back and compliance verbalized
Taper off Tegretol  completed.    Patient teaching done re: need to maintain NPO from midnight before ECT until after ECT the next day with teach back and compliance verbalized
Taper off Tegretol 
Taper off Tegretol  completed.    Patient teaching done re: need to maintain NPO from midnight before ECT until after ECT the next day with teach back and compliance verbalized
Taper off Tegretol     Patient teaching done re: need to maintain NPO from midnight before ECT until after ECT the next day with teach back and compliance verbalzied
Taper off Tegretol  completed.    Patient teaching done re: need to maintain NPO from midnight before ECT until after ECT the next day with teach back and compliance verbalized    patient declines ECT
Taper off Tegretol  completed.    Patient teaching done re: need to maintain NPO from midnight before ECT until after ECT the next day with teach back and compliance verbalized    patient declines ECT
Taper off Tegretol  completed.    Patient teaching done re: need to maintain NPO from midnight before ECT until after ECT the next day with teach back and compliance verbalized
Taper off Tegretol  completed.    Patient teaching done re: need to maintain NPO from midnight before ECT until after ECT the next day with teach back and compliance verbalized    patient declines ECT
Taper off Tegretol  completed.    Patient teaching done re: need to maintain NPO from midnight before ECT until after ECT the next day with teach back and compliance verbalized
Taper off Tegretol  completed.    Patient teaching done re: need to maintain NPO from midnight before ECT until after ECT the next day with teach back and compliance verbalized
Taper off Tegretol  completed.    Patient teaching done re: need to maintain NPO from midnight before ECT until after ECT the next day with teach back and compliance verbalized    patient declines ECT

## 2021-10-18 NOTE — BH INPATIENT PSYCHIATRY PROGRESS NOTE - NSTXDCOTHRGOAL_PSY_ALL_CORE
Patient will report improved mood and insight into coping skills for symptoms, and with no SIIP.
Pt will report improved mood x 3 consecutive days.
Patient will report improved mood and insight into coping skills for symptoms, and with no SIIP.
Pt will report improved depression x 3 days.
Patient will report improved mood and insight into coping skills for symptoms, and with no SIIP.
Pt will report improved mood x 3 consecutive days.
Patient will report improved mood and insight into coping skills for symptoms, and with no SIIP.
Patient will report improved mood x 3 consecutive days.
Patient will report improved mood x 3 consecutive days.
Patient will report improved mood and insight into coping skills for symptoms, and with no SIIP.
Patient will report improved mood and insight into coping skills for symptoms, and with no SIIP.
Pt will report improved mood x 3 consecutive days.
Pt will report improved depression x 3 days.
Patient will report improved mood x 3 consecutive days.
Patient will report improved mood x 3 consecutive days.
Patient will report improved mood and insight into coping skills for symptoms, and with no SIIP.
Patient will report improved mood and insight into coping skills for symptoms, and with no SIIP.
Patient will report improved mood x 3 consecutive days.
Patient will report improved mood x 3 consecutive days.

## 2021-10-18 NOTE — BH INPATIENT PSYCHIATRY PROGRESS NOTE - NSTXDCOTHRPROGRES_PSY_ALL_CORE
No Change
Improving
No Change
Improving
No Change
Met - goal discontinued
No Change
Improving
No Change
Improving
Met - goal discontinued
No Change
Improving
Met - goal discontinued
No Change

## 2021-10-18 NOTE — BH PSYCHOLOGY - GROUP THERAPY NOTE - NSPSYCHOLGRPDBTTOL_PSY_A_CORE FT
Today's skill focused on the distress tolerance module: crisis survival skills and STOP. The group began with introductions, skills check in, and a mindfulness exercise. Next, the  introduced the module and discussed the skills involved. Then, some members of the group took turns reading out of the handout. After each part of the handout, the group engaged in a discussion about what they thought about the skill discussed, whether they've tried applying it and/or any difficulties with applying such a skill. The group went over the STOP handout and took turns reading out loud. The group ended by reviewing the practice worksheet and encouraging group members to practice the skill outside of group. 
n/a
Today's skill focused on the distress tolerance module: Improving the moment. The group began with introductions, skills check in, and a mindfulness exercise. Next, the  introduced the module and discussed the skills involved. Then, some members of the group took turns reading out of the handout. After each part of the handout, the group engaged in a discussion about what they thought about the skill discussed, whether they've tried applying it and/or any difficulties with applying such a skill. The group ended by reviewing the practice worksheet and encouraging group members to practice the skill outside of group. 
Today's skill focused on the distress tolerance module: TIPP and paired relaxation. The group began with introductions, skills check in, and a mindfulness exercise. Next, the  introduced the module and discussed the skills involved. Then, some members of the group took turns reading out of the handout. After each part of the handout, the group engaged in a discussion about what they thought about the skill discussed, whether they've tried applying it and/or any difficulties with applying such a skill. The group engaged in a paired muscle relaxation exercise. The group ended by reviewing the practice worksheet and encouraging group members to practice the skill outside of group. 
n/a

## 2021-10-18 NOTE — BH INPATIENT PSYCHIATRY PROGRESS NOTE - NSTXCOPEPROGRES_PSY_ALL_CORE
Met - goal discontinued
No Change
Improving
No Change
Improving
No Change
Improving
No Change
Improving
Improving

## 2021-10-18 NOTE — BH INPATIENT PSYCHIATRY PROGRESS NOTE - NSBHADMITMEDEDUDETAILS_A_CORE FT
Patient teaching done re: DC of Pamelor and Lexapro, and starting Zoloft, with potential benefits/SE of Zoloft explained with patient agreeing to Rx change. 
Patient teaching done re: need to take Rx as ordered and follow up with aftercare for sx management and relapse prevention with teach back verbalized 
Patient teaching done re: need to maintain NPO from midnight before ECT until after ECT the next day with teach back and compliance verbalzied
Patient teaching continues re: need to take Rx as ordered for sx management and to participate in milieu for sx management with patient saying ok
Patient teaching continues re: need to maintain NPO from midnight before ECT until after ECT the next day with teach back verbalzied
Patient teaching continues re: to take Rx as ordered with patient saying ok
Patient teaching done re: need to take Rx as ordered with patient agreeing to take Rx
Patient teaching done re: ECT cancelled due to cognitive issues, and to orient herself to the day and date every day on the blackboard in the day area, with neri agreeing to do so. 
Patient teaching done re: need for NPO from midnight tonight until after ECT tomorrow, with teach back verbalzied
Patient teaching done re: need to maintain NPO from midnight before ECT until after ECT the next day with teach back and compliance verbalized 
Patient teaching done re: need to maintain NPO from midnight before ECT until after ECT the next day with teach back and compliance verbalized 
Patient teaching continues re: need for Rx adherence for sx management with patient agreeing to take Rx
Patient teaching done re: need for Rx and aftercare adherence for sx management and relapse prevention with patient saying ok
Patient teaching done re: need to maintain NPO from midnight before ECT until after ECT the next day with teach back and compliance verbalized 
Patient teaching provided re: need to maintain NPO status from midnight last night until after ECT today with teach back and compliance verbalzied
Patient teaching done re: need to take Rx as ordered with patient saying ok
Patient teaching done re: need for Rx and aftercare adherence for sx management and relapse prevention with teach back and compliance verbalized
Patient teaching done re: need to take Rx as ordered with patient saying ok
Patient teaching done re: need to maintain NPO from midnight before ECT until after ECT the next day with teach back and compliance verbalized

## 2021-10-18 NOTE — BH DISCHARGE NOTE NURSING/SOCIAL WORK/PSYCH REHAB - NSDCVIVACCINE_GEN_ALL_CORE_FT
influenza, high-dose, quadrivalent; 03-Oct-2021 17:29; Nori Palma (RN); [employer]; [id number] (Exp. Date: 30-Jun-2022); IntraMuscular; Deltoid Left.; 0.7 milliLiter(s); VIS (VIS Published: 15-Aug-2019, VIS Presented: 03-Oct-2021);

## 2021-10-18 NOTE — BH INPATIENT PSYCHIATRY PROGRESS NOTE - NSBHMSEKNOWHOW_PSY_ALL_CORE
Vocabulary

## 2021-10-18 NOTE — BH INPATIENT PSYCHIATRY PROGRESS NOTE - NSTXCOPEINTERMD_PSY_ALL_CORE
Encourage groups    Rx management  
Encourage groups
Encourage groups    Rx management  
Encourage groups
Encourage groups
Encourage groups    Rx management   ECT
Encourage groups    Rx management   ECT
Encourage groups
Encourage groups    Rx management  
Encourage groups
Encourage groups    Rx management  
Encourage groups    Rx management   ECT
Encourage groups    Rx management
Encourage groups    Rx management   ECT
Encourage groups    Rx management
Encourage groups    Rx management

## 2021-10-18 NOTE — BH PSYCHOLOGY - GROUP THERAPY NOTE - NSPSYCHOLGRPDBTPT_PSY_A_CORE
Patient reporting good behavior control/Patient able to identify mood states
patient showing good behavior control/Patient able to identify mood states
stable mood and affect in group/no self-destructive impulses/behaviors

## 2021-10-18 NOTE — BH INPATIENT PSYCHIATRY PROGRESS NOTE - PRN MEDS
MEDICATIONS  (PRN):  haloperidol     Tablet 5 milliGRAM(s) Oral every 6 hours PRN agitation  haloperidol    Injectable 5 milliGRAM(s) IntraMuscular once PRN severe agitation  LORazepam     Tablet 1 milliGRAM(s) Oral every 4 hours PRN anxiety  LORazepam   Injectable 2 milliGRAM(s) IntraMuscular once PRN severe agitation  
MEDICATIONS  (PRN):  acetaminophen   Tablet .. 650 milliGRAM(s) Oral every 6 hours PRN Temp greater or equal to 38C (100.4F), Mild Pain (1 - 3), Moderate Pain (4 - 6)  haloperidol     Tablet 5 milliGRAM(s) Oral every 6 hours PRN agitation  haloperidol    Injectable 5 milliGRAM(s) IntraMuscular once PRN severe agitation  ibuprofen  Tablet. 600 milliGRAM(s) Oral every 6 hours PRN Mild Pain (1 - 3), Moderate Pain (4 - 6)  LORazepam     Tablet 1 milliGRAM(s) Oral every 6 hours PRN anxiety  LORazepam   Injectable 1 milliGRAM(s) IntraMuscular once PRN severe agitation  
MEDICATIONS  (PRN):  acetaminophen   Tablet .. 325 milliGRAM(s) Oral every 4 hours PRN Moderate Pain (4 - 6)  haloperidol     Tablet 5 milliGRAM(s) Oral every 6 hours PRN agitation  haloperidol    Injectable 5 milliGRAM(s) IntraMuscular once PRN severe agitation  LORazepam     Tablet 1 milliGRAM(s) Oral every 6 hours PRN anxiety  LORazepam   Injectable 1 milliGRAM(s) IntraMuscular once PRN severe agitation  
MEDICATIONS  (PRN):  acetaminophen   Tablet .. 650 milliGRAM(s) Oral every 6 hours PRN Temp greater or equal to 38C (100.4F), Mild Pain (1 - 3), Moderate Pain (4 - 6)  haloperidol     Tablet 5 milliGRAM(s) Oral every 6 hours PRN agitation  haloperidol    Injectable 5 milliGRAM(s) IntraMuscular once PRN severe agitation  ibuprofen  Tablet. 600 milliGRAM(s) Oral every 6 hours PRN Mild Pain (1 - 3), Moderate Pain (4 - 6)  LORazepam     Tablet 1 milliGRAM(s) Oral every 6 hours PRN anxiety  LORazepam   Injectable 1 milliGRAM(s) IntraMuscular once PRN severe agitation  
MEDICATIONS  (PRN):  haloperidol     Tablet 5 milliGRAM(s) Oral every 6 hours PRN agitation  haloperidol    Injectable 5 milliGRAM(s) IntraMuscular once PRN severe agitation  LORazepam     Tablet 1 milliGRAM(s) Oral every 6 hours PRN anxiety  LORazepam   Injectable 1 milliGRAM(s) IntraMuscular once PRN severe agitation  
MEDICATIONS  (PRN):  haloperidol     Tablet 5 milliGRAM(s) Oral every 6 hours PRN agitation  haloperidol    Injectable 5 milliGRAM(s) IntraMuscular once PRN severe agitation  LORazepam     Tablet 1 milliGRAM(s) Oral every 4 hours PRN anxiety  LORazepam   Injectable 2 milliGRAM(s) IntraMuscular once PRN severe agitation  
MEDICATIONS  (PRN):  acetaminophen   Tablet .. 650 milliGRAM(s) Oral every 6 hours PRN Temp greater or equal to 38C (100.4F), Mild Pain (1 - 3), Moderate Pain (4 - 6)  haloperidol     Tablet 5 milliGRAM(s) Oral every 6 hours PRN agitation  haloperidol    Injectable 5 milliGRAM(s) IntraMuscular once PRN severe agitation  ibuprofen  Tablet. 600 milliGRAM(s) Oral every 6 hours PRN Mild Pain (1 - 3), Moderate Pain (4 - 6)  LORazepam     Tablet 1 milliGRAM(s) Oral every 6 hours PRN anxiety  LORazepam   Injectable 1 milliGRAM(s) IntraMuscular once PRN severe agitation  
MEDICATIONS  (PRN):  acetaminophen   Tablet .. 650 milliGRAM(s) Oral every 6 hours PRN Temp greater or equal to 38C (100.4F), Mild Pain (1 - 3), Moderate Pain (4 - 6)  haloperidol     Tablet 5 milliGRAM(s) Oral every 6 hours PRN agitation  haloperidol    Injectable 5 milliGRAM(s) IntraMuscular once PRN severe agitation  ibuprofen  Tablet. 600 milliGRAM(s) Oral every 6 hours PRN Mild Pain (1 - 3), Moderate Pain (4 - 6)  LORazepam     Tablet 1 milliGRAM(s) Oral every 6 hours PRN anxiety  LORazepam   Injectable 1 milliGRAM(s) IntraMuscular once PRN severe agitation  
MEDICATIONS  (PRN):  haloperidol     Tablet 5 milliGRAM(s) Oral every 6 hours PRN agitation  haloperidol    Injectable 5 milliGRAM(s) IntraMuscular once PRN severe agitation  LORazepam     Tablet 1 milliGRAM(s) Oral every 4 hours PRN anxiety  LORazepam   Injectable 2 milliGRAM(s) IntraMuscular once PRN severe agitation  
MEDICATIONS  (PRN):  haloperidol     Tablet 5 milliGRAM(s) Oral every 6 hours PRN agitation  haloperidol    Injectable 5 milliGRAM(s) IntraMuscular once PRN severe agitation  LORazepam     Tablet 1 milliGRAM(s) Oral every 4 hours PRN anxiety  LORazepam   Injectable 2 milliGRAM(s) IntraMuscular once PRN severe agitation  
MEDICATIONS  (PRN):  acetaminophen   Tablet .. 650 milliGRAM(s) Oral every 6 hours PRN Temp greater or equal to 38C (100.4F), Mild Pain (1 - 3), Moderate Pain (4 - 6)  haloperidol     Tablet 5 milliGRAM(s) Oral every 6 hours PRN agitation  haloperidol    Injectable 5 milliGRAM(s) IntraMuscular once PRN severe agitation  ibuprofen  Tablet. 600 milliGRAM(s) Oral every 6 hours PRN Mild Pain (1 - 3), Moderate Pain (4 - 6)  LORazepam     Tablet 1 milliGRAM(s) Oral every 6 hours PRN anxiety  LORazepam   Injectable 1 milliGRAM(s) IntraMuscular once PRN severe agitation  
MEDICATIONS  (PRN):  haloperidol     Tablet 5 milliGRAM(s) Oral every 6 hours PRN agitation  haloperidol    Injectable 5 milliGRAM(s) IntraMuscular once PRN severe agitation  LORazepam     Tablet 1 milliGRAM(s) Oral every 4 hours PRN anxiety  LORazepam   Injectable 2 milliGRAM(s) IntraMuscular once PRN severe agitation  
MEDICATIONS  (PRN):  acetaminophen   Tablet .. 650 milliGRAM(s) Oral every 6 hours PRN Temp greater or equal to 38C (100.4F), Mild Pain (1 - 3), Moderate Pain (4 - 6)  haloperidol     Tablet 5 milliGRAM(s) Oral every 6 hours PRN agitation  haloperidol    Injectable 5 milliGRAM(s) IntraMuscular once PRN severe agitation  ibuprofen  Tablet. 600 milliGRAM(s) Oral every 6 hours PRN Mild Pain (1 - 3), Moderate Pain (4 - 6)  LORazepam     Tablet 1 milliGRAM(s) Oral every 6 hours PRN anxiety  LORazepam   Injectable 1 milliGRAM(s) IntraMuscular once PRN severe agitation  
MEDICATIONS  (PRN):  acetaminophen   Tablet .. 650 milliGRAM(s) Oral every 6 hours PRN Temp greater or equal to 38C (100.4F), Mild Pain (1 - 3), Moderate Pain (4 - 6)  haloperidol     Tablet 5 milliGRAM(s) Oral every 6 hours PRN agitation  haloperidol    Injectable 5 milliGRAM(s) IntraMuscular once PRN severe agitation  ibuprofen  Tablet. 600 milliGRAM(s) Oral every 6 hours PRN Mild Pain (1 - 3), Moderate Pain (4 - 6)  LORazepam     Tablet 1 milliGRAM(s) Oral every 6 hours PRN anxiety  LORazepam   Injectable 1 milliGRAM(s) IntraMuscular once PRN severe agitation  
MEDICATIONS  (PRN):  acetaminophen   Tablet .. 650 milliGRAM(s) Oral every 6 hours PRN Temp greater or equal to 38C (100.4F), Mild Pain (1 - 3), Moderate Pain (4 - 6)  haloperidol     Tablet 5 milliGRAM(s) Oral every 6 hours PRN agitation  haloperidol    Injectable 5 milliGRAM(s) IntraMuscular once PRN severe agitation  LORazepam     Tablet 1 milliGRAM(s) Oral every 6 hours PRN anxiety  LORazepam   Injectable 1 milliGRAM(s) IntraMuscular once PRN severe agitation  
MEDICATIONS  (PRN):  acetaminophen   Tablet .. 650 milliGRAM(s) Oral every 6 hours PRN Temp greater or equal to 38C (100.4F), Mild Pain (1 - 3), Moderate Pain (4 - 6)  haloperidol     Tablet 5 milliGRAM(s) Oral every 6 hours PRN agitation  haloperidol    Injectable 5 milliGRAM(s) IntraMuscular once PRN severe agitation  ibuprofen  Tablet. 600 milliGRAM(s) Oral every 6 hours PRN Mild Pain (1 - 3), Moderate Pain (4 - 6)  LORazepam     Tablet 1 milliGRAM(s) Oral every 6 hours PRN anxiety  LORazepam   Injectable 1 milliGRAM(s) IntraMuscular once PRN severe agitation  
MEDICATIONS  (PRN):  acetaminophen   Tablet .. 650 milliGRAM(s) Oral every 6 hours PRN Temp greater or equal to 38C (100.4F), Mild Pain (1 - 3), Moderate Pain (4 - 6)  haloperidol     Tablet 5 milliGRAM(s) Oral every 6 hours PRN agitation  haloperidol    Injectable 5 milliGRAM(s) IntraMuscular once PRN severe agitation  LORazepam     Tablet 1 milliGRAM(s) Oral every 6 hours PRN anxiety  LORazepam   Injectable 1 milliGRAM(s) IntraMuscular once PRN severe agitation  
MEDICATIONS  (PRN):  acetaminophen   Tablet .. 650 milliGRAM(s) Oral every 6 hours PRN Temp greater or equal to 38C (100.4F), Mild Pain (1 - 3), Moderate Pain (4 - 6)  haloperidol     Tablet 5 milliGRAM(s) Oral every 6 hours PRN agitation  haloperidol    Injectable 5 milliGRAM(s) IntraMuscular once PRN severe agitation  ibuprofen  Tablet. 600 milliGRAM(s) Oral every 6 hours PRN Mild Pain (1 - 3), Moderate Pain (4 - 6)  LORazepam     Tablet 1 milliGRAM(s) Oral every 6 hours PRN anxiety  LORazepam   Injectable 1 milliGRAM(s) IntraMuscular once PRN severe agitation  
MEDICATIONS  (PRN):  acetaminophen   Tablet .. 650 milliGRAM(s) Oral every 6 hours PRN Temp greater or equal to 38C (100.4F), Mild Pain (1 - 3), Moderate Pain (4 - 6)  haloperidol     Tablet 5 milliGRAM(s) Oral every 6 hours PRN agitation  haloperidol    Injectable 5 milliGRAM(s) IntraMuscular once PRN severe agitation  ibuprofen  Tablet. 600 milliGRAM(s) Oral every 6 hours PRN Mild Pain (1 - 3), Moderate Pain (4 - 6)  LORazepam     Tablet 1 milliGRAM(s) Oral every 6 hours PRN anxiety  LORazepam   Injectable 1 milliGRAM(s) IntraMuscular once PRN severe agitation

## 2021-10-18 NOTE — BH PSYCHOLOGY - GROUP THERAPY NOTE - NSBHPSYCHOLRESPONSE_PSY_A_CORE
Coping skills acquired/Accepted support
Coping skills acquired/Insight displayed/Accepted support
Coping skills acquired/Insight displayed/Accepted support
Coping skills acquired/Accepted support
Symptoms reduced
Coping skills acquired/Accepted support

## 2021-10-18 NOTE — BH PSYCHOLOGY - GROUP THERAPY NOTE - NSBHPSYCHOLGRPNAME_PSY_A_CORE
DBT (Dialectical Behavior Therapy) Group
DBT (Dialectical Behavior Therapy) Group
Anger Management
DBT (Dialectical Behavior Therapy) Group
DBT Skills
DBT Skills

## 2021-10-18 NOTE — BH DISCHARGE NOTE NURSING/SOCIAL WORK/PSYCH REHAB - PATIENT PORTAL LINK FT
You can access the FollowMyHealth Patient Portal offered by [facility] by registering at the following website: [email or web address]. By joining [facility]’s FollowMyHealth portal, you will also be able to view your health information using other applications (apps) compatible with our system.

## 2021-10-18 NOTE — BH INPATIENT PSYCHIATRY PROGRESS NOTE - NSBHCONTPROVIDER_PSY_ALL_CORE
No, attempted...
Yes...
No, attempted...
No, attempted...
No...
No...
Yes...
No, attempted...
No...
No, attempted...
No, attempted...
Yes...
No, attempted...
Yes...
No, attempted...
Yes...
No, attempted...
No, attempted...
No...
No, attempted...

## 2021-10-18 NOTE — BH INPATIENT PSYCHIATRY PROGRESS NOTE - NSDCCRITERIA_PSY_ALL_CORE
symptom management, safety planning, care coordination

## 2021-10-18 NOTE — BH INPATIENT PSYCHIATRY PROGRESS NOTE - NSTXECTDATEEST_PSY_ALL_CORE
16-Sep-2021
30-Sep-2021
16-Sep-2021
30-Sep-2021
16-Sep-2021
16-Sep-2021
30-Sep-2021
16-Sep-2021
30-Sep-2021
30-Sep-2021
16-Sep-2021
30-Sep-2021
16-Sep-2021
30-Sep-2021
30-Sep-2021
16-Sep-2021
30-Sep-2021
16-Sep-2021
30-Sep-2021
16-Sep-2021
30-Sep-2021
30-Sep-2021

## 2021-10-18 NOTE — BH INPATIENT PSYCHIATRY PROGRESS NOTE - NSTXSUICIDPROGRES_PSY_ALL_CORE
Improving
Met - goal discontinued
Improving
Met - goal discontinued
No Change
Met - goal discontinued
Improving
Met - goal discontinued
Met - goal discontinued
No Change
Improving
No Change
Improving
No Change
Improving
Improving
Met - goal discontinued
Improving
Met - goal discontinued
No Change
Met - goal discontinued
Met - goal discontinued
Improving
Met - goal discontinued
Improving

## 2022-01-05 ENCOUNTER — OUTPATIENT (OUTPATIENT)
Dept: OUTPATIENT SERVICES | Facility: HOSPITAL | Age: 82
LOS: 1 days | Discharge: HOME | End: 2022-01-05
Payer: MEDICARE

## 2022-01-05 DIAGNOSIS — Z98.891 HISTORY OF UTERINE SCAR FROM PREVIOUS SURGERY: Chronic | ICD-10-CM

## 2022-01-05 DIAGNOSIS — Z90.5 ACQUIRED ABSENCE OF KIDNEY: Chronic | ICD-10-CM

## 2022-01-07 PROCEDURE — 90792 PSYCH DIAG EVAL W/MED SRVCS: CPT

## 2022-01-07 NOTE — ECT CONSULT NOTE - DETAILS
H/o SA by cutting wrist in 2020 (per pt did not need sutures and "I didn't really mean to do it" mom possible depressed; daughter with etoh related issues

## 2022-01-07 NOTE — ECT CONSULT NOTE - OTHER PAST PSYCHIATRIC HISTORY (INCLUDE DETAILS REGARDING ONSET, COURSE OF ILLNESS, INPATIENT/OUTPATIENT TREATMENT)
Patient seemed to be poor historian on certain details of recent events and specific dates but is able to give basic history and answer questions appropriately.   Will try to obtain collateral from outpt psych MD and/or therapist (Dangelo Darden, PhD, [phone].    80 yo female with long h/o Bipolar I d/o and multiple hospitalizations at [facility] for decompensation referred for ECT.   Pt was inpt at [facility] and received ECT for admission symptoms of depression and SI.  She received 6 BF ECT treatments (last on 11/4 at 60% energy).  Last ECT was canceled as she had notable cognitive decline to <5 on ECT screen.    Pt states at discharge she refused further ECT.   Now states she will consider ECT "Dr. Rucker said it would be just once a month".  Despite accepting this rec, pt denies any decline in her mental state since her discharge.    Able to review history with me of having Bipolar I, initially states symptoms began in 20s then states maybe it was late 40s and she has been hospitalized "quite often".  States she does  not remember what happens when she gets admitted but states usually her family tells her to go in so she does.   When reminded that last admit note says for depression and SI, she replies "oh yah, but I'm not like that now".   Reviewing symptoms of depression, she denies feeling down, has early morning awakenings (4a) but goes to bed at 9p so is sleeping 7hr/night regularly.  Has low appetite (which she does not feel is abnormal for her) but is eating regular meals regardless.  No weight change.   Is able to read, watch TV shows, do word puzzles like normal and enjoy these activities.  States she has friends in community and enjoys spending time with them.  Does say she hurt her back about 1 week ago reaching for products in the supermarket making her life more challenging now because of pain.  Taking Alleve and feels when pain/strain resolves, she'll be 100%.   Also denies any acute manic symptoms including not feeling overactivated, no racing thoughts.  Does say she does do lots of things and sometimes is a little disorganized but isn't sure if this is any more than usual.   Does state a few weeks ago her children "freaked out" as she had a black eye and didn't remember how she got it.   She denies any other evidence of bodily harm since then and continues to deny memory of an fall or facial trauma.      SH: , started college but never finished.  Raised 3 children who are all alive and well.   Eldest son in [place], daughter in [facility], Son in [place].  Has multiple friends in community, denies etoh/drugs/nicotine Patient seemed to be poor historian on certain details of recent events and specific dates but is able to give basic history and answer questions appropriately.   Will try to obtain collateral from outpt psych MD and/or therapist (Dangelo Darden, PhD, [phone]).    80 yo female with long h/o Bipolar I d/o and multiple hospitalizations at [facility] for decompensation referred for ECT.   Pt was inpt at [facility] and received ECT for admission symptoms of depression and SI.  She received 6 BF ECT treatments (last on 11/4 at 60% energy).  Last ECT was canceled as she had notable cognitive decline to <5 on ECT screen.    Pt states at discharge she refused further ECT.   Now states she will consider ECT "Dr. Rucker said it would be just once a month".  Despite accepting this rec, pt denies any decline in her mental state since her discharge.    Able to review history with me of having Bipolar I, initially states symptoms began in 20s then states maybe it was late 40s and she has been hospitalized "quite often".  States she does  not remember what happens when she gets admitted but states usually her family tells her to go in so she does.   When reminded that last admit note says for depression and SI, she replies "oh yah, but I'm not like that now".   Reviewing symptoms of depression, she denies feeling down, has early morning awakenings (4a) but goes to bed at 9p so is sleeping 7hr/night regularly.  Has low appetite (which she does not feel is abnormal for her) but is eating regular meals regardless.  No weight change.   Is able to read, watch TV shows, do word puzzles like normal and enjoy these activities.  States she has friends in community and enjoys spending time with them.  Does say she hurt her back about 1 week ago reaching for products in the supermarket making her life more challenging now because of pain.  Taking Alleve and feels when pain/strain resolves, she'll be 100%.   Also denies any acute manic symptoms including not feeling overactivated, no racing thoughts.  Does say she does do lots of things and sometimes is a little disorganized but isn't sure if this is any more than usual.   Does state a few weeks ago her children "freaked out" as she had a black eye and didn't remember how she got it.   She denies any other evidence of bodily harm since then and continues to deny memory of an fall or facial trauma.      SH: , started college but never finished.  Raised 3 children who are all alive and well.   Eldest son in [place], daughter in [facility], Son in [place].  Has multiple friends in community, denies etoh/drugs/nicotine

## 2022-01-07 NOTE — ECT CONSULT NOTE - NSECTMENTALSTATUSEXAM_PSY_ALL_CORE
Interview via phone, not able to visualize pt.    Conscious, cooperative, alert.   No psychomotor agitation/retardation.   Speech: regular rate and rhythm,   Mood: "I think I'm fine" Affect: appropriate, full range.   Thought Process: linear, goal directed   Thought Content: No Death wish, No Suicidal ideation/intent/plan, No homicidal ideation/intent/plan. No delusions   Perception: No hallucinations   Insight and Judgement: fair  Impulse Control: fair at this time.

## 2022-01-07 NOTE — ECT CONSULT NOTE - CURRENT MEDICATION
MEDICATIONS  (STANDING):  Lexapro 20mg daily, Nortriptyline 10mg qam and 30mg qbed, Tegretol 100mg daily and 200mg at bed, Synthroid 72mcg daily, Amlodipine 100mg daily, Pepcid 20mg BID, Probiotics

## 2022-01-07 NOTE — ECT CONSULT NOTE - NSECTREASONCONSCOMMENTS_PSY_ALL_CORE
Pt interviewed via telephone with patient's verbal permission (pt was not able to figure out how to connect to video portion of call).  Pt was home alone and MD was in private home office.

## 2022-01-07 NOTE — ECT CONSULT NOTE - NSECTASSESSRECOMM_PSY_ALL_CORE
Assessment/Recs pending collateral from referring psych.    Pt aware that after 2 months without ECT, we typically would restart ECT I someone was having an acute decompensation on an acute schedule of 2x/week.  Pt currently very clear that if she comes she would only want monthly ECT.  Dr. Rucker informed. Assessment/Recs pending collateral from referring psych reevaluation.    Dr. Rucker currently out of town but was contacted and given info from consult.    He states he had seen pt several weeks ago and she also reported being "fine" but he received a frantic call from family "she needs ECT" and that patient had agreed to monthly (only) ECT.   He will follow-up with her when he returns.  He agrees there does not seem to be any acute safety issues.   It is possible that evidence supporting decompensation of nena was missed as pt was only able to be interviewed via telephone call.    Pt and Dr. Rucker aware that after 2 months without ECT, we typically would only restart ECT if someone was having an acute decompensation on an acute schedule of 2x/week.  Pt currently very clear that if she comes she would only want monthly ECT.  Dr. Rucker informed.   Dr. Rucker will notify ECT team if he has additional clinical findings supporting need for acute ECT.

## 2022-01-11 ENCOUNTER — APPOINTMENT (OUTPATIENT)
Dept: MRI IMAGING | Facility: CLINIC | Age: 82
End: 2022-01-11
Payer: MEDICARE

## 2022-01-11 PROCEDURE — 72146 MRI CHEST SPINE W/O DYE: CPT | Mod: MH

## 2022-05-02 NOTE — BH SAFETY PLAN - STEP 4 ASK HELP NAME
Patient had last office visit on 1/7/22.  Patient started on strattera 10mg 1/7/22. Patient had last refill of strattera on 3/28/22 with #30 and 0 refills.     Script sent to pharmacy per [facility] Protocol, last visit note and per Dr. Guajardo.     .

## 2022-12-05 NOTE — PATIENT PROFILE ADULT - NSPROGENOTHERPROVIDER_GEN_A_NUR
Problem: MOBILITY - ADULT  Goal: Maintain or return to baseline ADL function  Description: INTERVENTIONS:  -  Assess patient's ability to carry out ADLs; assess patient's baseline for ADL function and identify physical deficits which impact ability to perform ADLs (bathing, care of mouth/teeth, toileting, grooming, dressing, etc )  - Assess/evaluate cause of self-care deficits   - Assess range of motion  - Assess patient's mobility; develop plan if impaired  - Assess patient's need for assistive devices and provide as appropriate  - Encourage maximum independence but intervene and supervise when necessary  - Involve family in performance of ADLs  - Assess for home care needs following discharge   - Consider OT consult to assist with ADL evaluation and planning for discharge  - Provide patient education as appropriate  Outcome: Progressing  Goal: Maintains/Returns to pre admission functional level  Description: INTERVENTIONS:  - Perform BMAT or MOVE assessment daily    - Set and communicate daily mobility goal to care team and patient/family/caregiver  - Collaborate with rehabilitation services on mobility goals if consulted  - Perform Range of Motion   times a day  - Reposition patient every   hours    - Dangle patient   times a day  - Stand patient   times a day  - Ambulate patient   times a day  - Out of bed to chair   times a day   - Out of bed for meals   times a day  - Out of bed for toileting  - Record patient progress and toleration of activity level   Outcome: Progressing     Problem: PAIN - ADULT  Goal: Verbalizes/displays adequate comfort level or baseline comfort level  Description: Interventions:  - Encourage patient to monitor pain and request assistance  - Assess pain using appropriate pain scale  - Administer analgesics based on type and severity of pain and evaluate response  - Implement non-pharmacological measures as appropriate and evaluate response  - Consider cultural and social influences on pain and pain management  - Notify physician/advanced practitioner if interventions unsuccessful or patient reports new pain  Outcome: Progressing     Problem: INFECTION - ADULT  Goal: Absence or prevention of progression during hospitalization  Description: INTERVENTIONS:  - Assess and monitor for signs and symptoms of infection  - Monitor lab/diagnostic results  - Monitor all insertion sites, i e  indwelling lines, tubes, and drains  - Monitor endotracheal if appropriate and nasal secretions for changes in amount and color  - [place] appropriate cooling/warming therapies per order  - Administer medications as ordered  - Instruct and encourage patient and family to use good hand hygiene technique  - Identify and instruct in appropriate isolation precautions for identified infection/condition  Outcome: Progressing  Goal: Absence of fever/infection during neutropenic period  Description: INTERVENTIONS:  - Monitor WBC    Outcome: Progressing     Problem: SAFETY ADULT  Goal: Maintain or return to baseline ADL function  Description: INTERVENTIONS:  -  Assess patient's ability to carry out ADLs; assess patient's baseline for ADL function and identify physical deficits which impact ability to perform ADLs (bathing, care of mouth/teeth, toileting, grooming, dressing, etc )  - Assess/evaluate cause of self-care deficits   - Assess range of motion  - Assess patient's mobility; develop plan if impaired  - Assess patient's need for assistive devices and provide as appropriate  - Encourage maximum independence but intervene and supervise when necessary  - Involve family in performance of ADLs  - Assess for home care needs following discharge   - Consider OT consult to assist with ADL evaluation and planning for discharge  - Provide patient education as appropriate  Outcome: Progressing  Goal: Maintains/Returns to pre admission functional level  Description: INTERVENTIONS:  - Perform BMAT or MOVE assessment daily    - Set and communicate daily mobility goal to care team and patient/family/caregiver  - Collaborate with rehabilitation services on mobility goals if consulted  - Perform Range of Motion   times a day  - Reposition patient every   hours    - Dangle patient   times a day  - Stand patient   times a day  - Ambulate patient   times a day  - Out of bed to chair   times a day   - Out of bed for meals   times a day  - Out of bed for toileting  - Record patient progress and toleration of activity level   Outcome: Progressing  Goal: Patient will remain free of falls  Description: INTERVENTIONS:  - Educate patient/family on patient safety including physical limitations  - Instruct patient to call for assistance with activity   - Consult OT/PT to assist with strengthening/mobility   - Keep Call bell within reach  - Keep bed low and locked with side rails adjusted as appropriate  - Keep care items and personal belongings within reach  - Initiate and maintain comfort rounds  - Make Fall Risk Sign visible to staff  - Offer Toileting every   Hours, in advance of need  - Initiate/Maintain  alarm  - Obtain necessary fall risk management equipment:    - Apply yellow socks and bracelet for high fall risk patients  - Consider moving patient to room near nurses station  Outcome: Progressing     Problem: DISCHARGE PLANNING  Goal: Discharge to home or other facility with appropriate resources  Description: INTERVENTIONS:  - Identify barriers to discharge w/patient and caregiver  - Arrange for needed discharge resources and transportation as appropriate  - Identify discharge learning needs (meds, wound care, etc )  - Arrange for interpretive services to assist at discharge as needed  - Refer to Case Management Department for coordinating discharge planning if the patient needs post-hospital services based on physician/advanced practitioner order or complex needs related to functional status, cognitive ability, or social support system  Outcome: Progressing     Problem: Knowledge Deficit  Goal: Patient/family/caregiver demonstrates understanding of disease process, treatment plan, medications, and discharge instructions  Description: Complete learning assessment and assess knowledge base    Interventions:  - Provide teaching at level of understanding  - Provide teaching via preferred learning methods  Outcome: Progressing     Problem: Prexisting or High Potential for Compromised Skin Integrity  Goal: Skin integrity is maintained or improved  Description: INTERVENTIONS:  - Identify patients at risk for skin breakdown  - Assess and monitor skin integrity  - Assess and monitor nutrition and hydration status  - Monitor labs   - Assess for incontinence   - Turn and reposition patient  - Assist with mobility/ambulation  - Relieve pressure over bony prominences  - Avoid friction and shearing  - Provide appropriate hygiene as needed including keeping skin clean and dry  - Evaluate need for skin moisturizer/barrier cream  - Collaborate with interdisciplinary team   - Patient/family teaching  - Consider wound care consult   Outcome: Progressing     Problem: Potential for Falls  Goal: Patient will remain free of falls  Description: INTERVENTIONS:  - Educate patient/family on patient safety including physical limitations  - Instruct patient to call for assistance with activity   - Consult OT/PT to assist with strengthening/mobility   - Keep Call bell within reach  - Keep bed low and locked with side rails adjusted as appropriate  - Keep care items and personal belongings within reach  - Initiate and maintain comfort rounds  - Make Fall Risk Sign visible to staff  - Offer Toileting every   Hours, in advance of need  - Initiate/Maintain   alarm  - Obtain necessary fall risk management equipment:    - Apply yellow socks and bracelet for high fall risk patients  - Consider moving patient to room near nurses station  Outcome: Progressing     Problem: Nutrition/Hydration-ADULT  Goal: Nutrient/Hydration intake appropriate for improving, restoring or maintaining nutritional needs  Description: Monitor and assess patient's nutrition/hydration status for malnutrition  Collaborate with interdisciplinary team and initiate plan and interventions as ordered  Monitor patient's weight and dietary intake as ordered or per policy  Utilize nutrition screening tool and intervene as necessary  Determine patient's food preferences and provide high-protein, high-caloric foods as appropriate       INTERVENTIONS:  - Monitor oral intake, urinary output, labs, and treatment plans  - Assess nutrition and hydration status and recommend course of action  - Evaluate amount of meals eaten  - Assist patient with eating if necessary   - Allow adequate time for meals  - Recommend/ encourage appropriate diets, oral nutritional supplements, and vitamin/mineral supplements  - Order, calculate, and assess calorie counts as needed  - Recommend, monitor, and adjust tube feedings and TPN/PPN based on assessed needs  - Assess need for intravenous fluids  - Provide specific nutrition/hydration education as appropriate  - Include patient/family/caregiver in decisions related to nutrition  Outcome: Progressing mental health services/outpatient care

## 2023-01-16 NOTE — ED ADULT NURSE NOTE - NS ED BHA MSE SPEECH VOLUME
RN placing call to convey results of/provider recommendations for 1/5/23 pap/HPV, but unable to reach pt; voicemail left for pt to return call to clinic.   Normal

## 2023-05-02 NOTE — BH PATIENT PROFILE - CHOOSE INDICATION TO IMMUNIZE (AN ORDER WILL BE GENERATED WHEN THIS NOTE IS SAVED):
Detail Level: Detailed Products Recommended: Elta MD General Sunscreen Counseling: I recommended a broad spectrum sunscreen with a SPF of 30 or higher. I explained that SPF 30 sunscreens block approximately 97 percent of the sun's harmful rays. Sunscreens should be applied at least 15 minutes prior to expected sun exposure and then every 2 hours after that as long as sun exposure continues. If swimming or exercising sunscreen should be reapplied every 45 minutes to an hour after getting wet or sweating. One ounce, or the equivalent of a shot glass full of sunscreen, is adequate to protect the skin not covered by a bathing suit. I also recommended a lip balm with a sunscreen as well. Sun protective clothing can be used in lieu of sunscreen but must be worn the entire time you are exposed to the sun's rays. Patient is not pregnant (male or female)

## 2023-10-03 NOTE — BH PATIENT PROFILE - DOMESTIC TRAVEL HIGH RISK QUESTION
Patient called the office needing clarification about following up. His pain management Dr. Raymond stated she would need a referral for an injection, but after clarifying in the last OVN it shows that the patient has already had three injections and it states to get the EMG and follow up afterwards. Patient would like clarification from Dr. Mckinley making sure he does not need another injection prior to following up.     Dr. Raymond said they will need a referral for the injection since three has already been completed and that they can possibly do it prior to Nov. 3rd. Patient is to get his EMG 10/4 and his follow up with Dr. Mckinley is 10/27   No

## 2024-04-10 NOTE — BH INPATIENT PSYCHIATRY DISCHARGE NOTE - NSBHMETABOLIC_PSY_ALL_CORE_FT
BMI: BMI (kg/m2): 28.9 (10-15-21 @ 07:47)  HbA1c: A1C with Estimated Average Glucose Result: 5.4 % (09-16-21 @ 10:37)    Glucose: POCT Blood Glucose.: 129 mg/dL (09-18-21 @ 05:33)    BP: --  Lipid Panel: Date/Time: 09-16-21 @ 10:37  Cholesterol, Serum: 241  Direct LDL: --  HDL Cholesterol, Serum: 72  Total Cholesterol/HDL Ration Measurement: --  Triglycerides, Serum: 84   No

## 2024-08-08 NOTE — ED PROVIDER NOTE - NORMAL, MLM
Detail Level: Generalized Detail Level: Zone Sunscreen Recommendations: Discussed importance of sun protection with sun screen and photo protective clothing. Detail Level: Simple alfredito all pertinent systems normal

## 2024-09-05 NOTE — PROGRESS NOTE BEHAVIORAL HEALTH - BODY HABITUS
[Follow-Up Visit] : a follow-up pain management visit
[FreeTextEntry2] : Low back/bilateral buttock pain
Well nourished

## 2025-03-19 NOTE — BH INPATIENT PSYCHIATRY PROGRESS NOTE - NSBHMSELANG_PSY_A_CORE
Pt discharged home with son after instructions given and questions answered.  Transported via wheelchair to car and assisted into car without incidence.  
No abnormalities noted